# Patient Record
Sex: MALE | Race: WHITE | Employment: OTHER | URBAN - METROPOLITAN AREA
[De-identification: names, ages, dates, MRNs, and addresses within clinical notes are randomized per-mention and may not be internally consistent; named-entity substitution may affect disease eponyms.]

---

## 2017-01-11 ENCOUNTER — PATIENT OUTREACH (OUTPATIENT)
Dept: CASE MANAGEMENT | Age: 72
End: 2017-01-11

## 2017-01-11 NOTE — PROGRESS NOTES
Spoke to pt for CCM today. Pt states he is doing fantastic currently and does not need anything at this time. Advised will call in a month to see how things are going. Pt thanked me for calling.   Time spent communication and chart review: 5 minutes

## 2017-03-09 ENCOUNTER — PATIENT OUTREACH (OUTPATIENT)
Dept: CASE MANAGEMENT | Age: 72
End: 2017-03-09

## 2017-03-09 NOTE — PROGRESS NOTES
LM for pt to call back for CCM. Advised was due for annual physical in January and encouraged to call and schedule.

## 2017-04-13 ENCOUNTER — TELEPHONE (OUTPATIENT)
Dept: ENDOCRINOLOGY CLINIC | Facility: CLINIC | Age: 72
End: 2017-04-13

## 2017-04-17 ENCOUNTER — TELEPHONE (OUTPATIENT)
Dept: INTERNAL MEDICINE CLINIC | Facility: CLINIC | Age: 72
End: 2017-04-17

## 2017-04-17 NOTE — TELEPHONE ENCOUNTER
Would you like more time for the visit-  39 or 60mins? Or 30mins ok as scheduled? Please advise. Thank you.

## 2017-04-17 NOTE — TELEPHONE ENCOUNTER
Called pt to inform, per AS, ok to combine wellness visit w/ DM FU on 5/31. Pt verbalized understanding, expressed much appreciation.

## 2017-04-21 RX ORDER — LOSARTAN POTASSIUM 25 MG/1
TABLET ORAL
Qty: 90 TABLET | Refills: 0 | Status: SHIPPED | OUTPATIENT
Start: 2017-04-21 | End: 2017-07-26

## 2017-05-02 DIAGNOSIS — E11.9 TYPE 2 DIABETES MELLITUS WITHOUT COMPLICATION, WITHOUT LONG-TERM CURRENT USE OF INSULIN (HCC): Primary | ICD-10-CM

## 2017-05-02 DIAGNOSIS — Z12.5 SCREENING FOR PROSTATE CANCER: ICD-10-CM

## 2017-05-02 DIAGNOSIS — D59.9 ACQUIRED HEMOLYTIC ANEMIA (HCC): ICD-10-CM

## 2017-05-02 DIAGNOSIS — E78.00 HIGH CHOLESTEROL: ICD-10-CM

## 2017-05-02 DIAGNOSIS — Z13.29 SCREENING FOR THYROID DISORDER: ICD-10-CM

## 2017-05-02 DIAGNOSIS — Z86.2 HISTORY OF HEMOLYTIC ANEMIA: ICD-10-CM

## 2017-05-02 NOTE — PROGRESS NOTES
Patient has upcoming wellness visit and diabetes follow up (combined- ok per AS) at end of month. I have ordered some labs for him, they need to be fasting.  Appointment is 10:00, so I am not sure if he wants to fast for appointment and have drawn at that t

## 2017-05-03 NOTE — PROGRESS NOTES
Spoke with pt informed him info listed below.  Pt requested orders to be mailed to his new home address (updated) and a reminder to be sent through [facility] to complete the labs [employer] message sent)

## 2017-05-05 ENCOUNTER — PATIENT OUTREACH (OUTPATIENT)
Dept: CASE MANAGEMENT | Age: 72
End: 2017-05-05

## 2017-05-05 DIAGNOSIS — E11.9 TYPE 2 DIABETES MELLITUS WITHOUT COMPLICATION, WITHOUT LONG-TERM CURRENT USE OF INSULIN (HCC): ICD-10-CM

## 2017-05-05 DIAGNOSIS — E78.00 HIGH CHOLESTEROL: ICD-10-CM

## 2017-05-05 DIAGNOSIS — I10 ESSENTIAL HYPERTENSION: Primary | ICD-10-CM

## 2017-05-05 PROCEDURE — 99490 CHRNC CARE MGMT STAFF 1ST 20: CPT

## 2017-05-05 NOTE — PROGRESS NOTES
Spoke to pt at length about CCM, current care plan and performed CCM assessment with pt, reviewed meds and compliance. Reviewed pt dx DM, HTN, and HLD. Support system: Brother lives nearby. Diet: Pt trying to watch his fat and carbs.   Pt states he had ea

## 2017-05-10 ENCOUNTER — TELEPHONE (OUTPATIENT)
Dept: INTERNAL MEDICINE CLINIC | Facility: CLINIC | Age: 72
End: 2017-05-10

## 2017-05-31 ENCOUNTER — OFFICE VISIT (OUTPATIENT)
Dept: INTERNAL MEDICINE CLINIC | Facility: CLINIC | Age: 72
End: 2017-05-31

## 2017-05-31 VITALS
BODY MASS INDEX: 38.92 KG/M2 | WEIGHT: 248 LBS | DIASTOLIC BLOOD PRESSURE: 78 MMHG | HEART RATE: 68 BPM | HEIGHT: 67 IN | RESPIRATION RATE: 16 BRPM | TEMPERATURE: 98 F | SYSTOLIC BLOOD PRESSURE: 134 MMHG

## 2017-05-31 DIAGNOSIS — D80.1 HYPOGAMMAGLOBULINEMIA (HCC): ICD-10-CM

## 2017-05-31 DIAGNOSIS — J30.1 ALLERGIC RHINITIS DUE TO POLLEN, UNSPECIFIED RHINITIS SEASONALITY: ICD-10-CM

## 2017-05-31 DIAGNOSIS — I10 ESSENTIAL HYPERTENSION: ICD-10-CM

## 2017-05-31 DIAGNOSIS — D59.9 ACQUIRED HEMOLYTIC ANEMIA (HCC): ICD-10-CM

## 2017-05-31 DIAGNOSIS — R94.31 NONSPECIFIC ABNORMAL ELECTROCARDIOGRAM (ECG) (EKG): ICD-10-CM

## 2017-05-31 DIAGNOSIS — Z00.00 MEDICARE ANNUAL WELLNESS VISIT, SUBSEQUENT: Primary | ICD-10-CM

## 2017-05-31 DIAGNOSIS — M06.4 INFLAMMATORY POLYARTHRITIS (HCC): ICD-10-CM

## 2017-05-31 DIAGNOSIS — R06.81 APNEA: ICD-10-CM

## 2017-05-31 DIAGNOSIS — Z00.00 ENCOUNTER FOR ANNUAL HEALTH EXAMINATION: ICD-10-CM

## 2017-05-31 DIAGNOSIS — E78.00 HIGH CHOLESTEROL: ICD-10-CM

## 2017-05-31 DIAGNOSIS — E55.9 VITAMIN D DEFICIENCY: ICD-10-CM

## 2017-05-31 DIAGNOSIS — Z86.2 HISTORY OF HEMOLYTIC ANEMIA: ICD-10-CM

## 2017-05-31 DIAGNOSIS — K50.90 CROHN'S DISEASE WITHOUT COMPLICATION, UNSPECIFIED GASTROINTESTINAL TRACT LOCATION (HCC): ICD-10-CM

## 2017-05-31 DIAGNOSIS — E11.9 TYPE 2 DIABETES MELLITUS WITHOUT COMPLICATION, UNSPECIFIED LONG TERM INSULIN USE STATUS: ICD-10-CM

## 2017-05-31 DIAGNOSIS — G47.33 OSA TREATED WITH BIPAP: ICD-10-CM

## 2017-05-31 PROCEDURE — 99213 OFFICE O/P EST LOW 20 MIN: CPT | Performed by: INTERNAL MEDICINE

## 2017-05-31 PROCEDURE — 83036 HEMOGLOBIN GLYCOSYLATED A1C: CPT | Performed by: INTERNAL MEDICINE

## 2017-05-31 PROCEDURE — G0439 PPPS, SUBSEQ VISIT: HCPCS | Performed by: INTERNAL MEDICINE

## 2017-05-31 NOTE — PATIENT INSTRUCTIONS
Martínez Bio III's SCREENING SCHEDULE   Tests on this list are recommended by your physician but may not be covered, or covered at this frequency, by your insurer. Please check with your insurance carrier before scheduling to verify coverage. results found for this or any previous visit.  Limited to patients who meet one of the following criteria:   • Men who are 73-68 years old and have smoked more than 100 cigarettes in their lifetime   • Anyone with a family history    Colorectal Cancer Scree performed in visit on 10/15/13  -PNEUMOCOCCAL IMMUNIZATION    Please get once after your 65th birthday    Hepatitis B for Moderate/High Risk No orders found for this or any previous visit.  Medium/high risk factors:   End-stage renal disease   Hemophiliacs

## 2017-05-31 NOTE — PROGRESS NOTES
HPI:   Killian Sandoval III is a 81year old male who presents for a Medicare Subsequent Annual Wellness visit (Pt already had Initial Annual Wellness). Here for medicare well visit and f/u of htn, high chol, gary and dm2.  All stable, taking meds, route daily. Dx: E11.9   METFORMIN  MG Oral Tab TAKE ONE TABLET BY MOUTH DAILY WITH BREAKFAST.    LOSARTAN POTASSIUM 25 MG Oral Tab TAKE ONE TABLET BY MOUTH DAILY   PRAVASTATIN SODIUM 10 MG Oral Tab TAKE 1 TABLET BY MOUTH EVERY EVENING   Budesonide-F unspecified hyperlipidemia; Unspecified essential hypertension; BLOOD CLOTS; PULMONARY EMBOLISM; History of blood transfusion; Extrinsic asthma, unspecified; Pneumonia, organism unspecified(486); Esophageal reflux; Crohn disease ([facility] (4/15/2014);  Migrator lb.    Medicare Hearing Assessment  (Required for AWV/SWV)    Whispered Voice and hearing aides      Visual Acuity                           General Appearance:  Alert, cooperative, no distress, appears stated age   Head:  Normocephalic, without obvious ab 10/15/2013       ASSESSMENT AND OTHER RELEVANT CHRONIC CONDITIONS:   Isabella Jones III is a 81year old male who presents for a Medicare Assessment.      PLAN SUMMARY:   Diagnoses and all orders for this visit:    Medicare annual wellness visit, sub plan.    No Follow-up on file.      Melissa Friend MD, 5/31/2017       General Health     In the past six months, have you lost more than 10 pounds without trying?: 2 - No    Has your appetite been poor?: No    How does the patient maintain a good energy l doing things (over the last two weeks)?: Not at all    Feeling down, depressed, or hopeless (over the last two weeks)?: Not at all    PHQ-2 SCORE: 0         Advance Directives     Do you have a healthcare power of ?: No    Do you have a living will FHx Glaucoma, AA>50, > 65 Data entered on: 7/26/2016   Last Dilated Eye Exam 7/26/2016       Prostate Cancer Screening      PSA  Annually PSA due on 04/19/2018  Update Health Maintenance if applicable   Immunizations      Influenza   Orders placed Dilated Eye Exam 7/26/2016     No flowsheet data found. COPD      Spirometry Testing Annually Spirometry date:  No flowsheet data found.

## 2017-06-28 RX ORDER — PRAVASTATIN SODIUM 10 MG
TABLET ORAL
Qty: 90 TABLET | Refills: 0 | Status: SHIPPED | OUTPATIENT
Start: 2017-06-28 | End: 2017-11-30

## 2017-06-29 ENCOUNTER — TELEPHONE (OUTPATIENT)
Dept: INTERNAL MEDICINE CLINIC | Facility: CLINIC | Age: 72
End: 2017-06-29

## 2017-06-29 NOTE — TELEPHONE ENCOUNTER
Spoke to pt. He has his diabetic eye exam scheduled for 7/5/17. He will ask his specialist to fax over a copy of the report once it is complete.

## 2017-06-30 ENCOUNTER — PATIENT OUTREACH (OUTPATIENT)
Dept: CASE MANAGEMENT | Age: 72
End: 2017-06-30

## 2017-06-30 DIAGNOSIS — E11.9 TYPE 2 DIABETES MELLITUS WITHOUT COMPLICATION, WITHOUT LONG-TERM CURRENT USE OF INSULIN (HCC): ICD-10-CM

## 2017-06-30 DIAGNOSIS — E78.00 HIGH CHOLESTEROL: ICD-10-CM

## 2017-06-30 DIAGNOSIS — I10 ESSENTIAL HYPERTENSION: ICD-10-CM

## 2017-06-30 PROCEDURE — 99490 CHRNC CARE MGMT STAFF 1ST 20: CPT

## 2017-06-30 RX ORDER — PRAVASTATIN SODIUM 10 MG
TABLET ORAL
Qty: 90 TABLET | Refills: 0 | Status: SHIPPED | OUTPATIENT
Start: 2017-06-30 | End: 2017-09-18

## 2017-06-30 NOTE — PROGRESS NOTES
Spoke to pt at length about CCM, current care plan and performed CCM assessment with pt, reviewed meds and compliance. Reviewed pt dx HTN,HLD, DM. Support system: Lives alone. Brother lives nearby.   Diet: Pt states he continues to watch his diet and eats

## 2017-07-06 ENCOUNTER — TELEPHONE (OUTPATIENT)
Dept: INTERNAL MEDICINE CLINIC | Facility: CLINIC | Age: 72
End: 2017-07-06

## 2017-07-26 RX ORDER — LOSARTAN POTASSIUM 25 MG/1
25 TABLET ORAL
Qty: 90 TABLET | Refills: 0 | Status: SHIPPED | OUTPATIENT
Start: 2017-07-26 | End: 2017-10-23

## 2017-08-09 ENCOUNTER — PATIENT OUTREACH (OUTPATIENT)
Dept: CASE MANAGEMENT | Age: 72
End: 2017-08-09

## 2017-08-09 DIAGNOSIS — E78.00 HIGH CHOLESTEROL: ICD-10-CM

## 2017-08-09 DIAGNOSIS — I10 ESSENTIAL HYPERTENSION: ICD-10-CM

## 2017-08-09 DIAGNOSIS — E11.9 TYPE 2 DIABETES MELLITUS WITHOUT COMPLICATION, WITHOUT LONG-TERM CURRENT USE OF INSULIN (HCC): ICD-10-CM

## 2017-08-09 PROCEDURE — 99490 CHRNC CARE MGMT STAFF 1ST 20: CPT

## 2017-08-09 NOTE — PROGRESS NOTES
Spoke to pt at length about CCM, current care plan and performed CCM assessment with pt, reviewed meds and compliance. Reviewed pt dx HTN, HLD, DM. Support system: Lives alone. Brother lives nearby. Visits family and friends regularly.   Diet: Pt follow

## 2017-09-18 RX ORDER — PRAVASTATIN SODIUM 10 MG
TABLET ORAL
Qty: 90 TABLET | Refills: 0 | Status: SHIPPED | OUTPATIENT
Start: 2017-09-18 | End: 2017-09-20

## 2017-09-20 ENCOUNTER — PATIENT OUTREACH (OUTPATIENT)
Dept: CASE MANAGEMENT | Age: 72
End: 2017-09-20

## 2017-09-20 DIAGNOSIS — I10 ESSENTIAL HYPERTENSION: ICD-10-CM

## 2017-09-20 DIAGNOSIS — E78.00 HIGH CHOLESTEROL: ICD-10-CM

## 2017-09-20 DIAGNOSIS — E11.9 TYPE 2 DIABETES MELLITUS WITHOUT COMPLICATION, WITHOUT LONG-TERM CURRENT USE OF INSULIN (HCC): ICD-10-CM

## 2017-09-20 PROCEDURE — 99490 CHRNC CARE MGMT STAFF 1ST 20: CPT

## 2017-09-20 RX ORDER — PRAVASTATIN SODIUM 10 MG
TABLET ORAL
Qty: 90 TABLET | Refills: 0 | Status: SHIPPED | OUTPATIENT
Start: 2017-09-20 | End: 2017-11-30

## 2017-09-20 NOTE — PROGRESS NOTES
Spoke to pt at length about CCM, current care plan and performed CCM assessment with pt, reviewed meds and compliance. Reviewed pt dx DM2, HTN, HLD. Support system: Lives alone. Visits brother nearby. Brother in [place].   Diet: Pt states his diet is ok how

## 2017-10-13 ENCOUNTER — PATIENT OUTREACH (OUTPATIENT)
Dept: CASE MANAGEMENT | Age: 72
End: 2017-10-13

## 2017-10-13 NOTE — PROGRESS NOTES
Coordination of education, review of chart, update to pt record, compilation and mailing resources/materials: Flu education, Why get the flu vaccine, and request to get flu vaccine with PCP and or Gutierrez hernandez.   Time: 5 min

## 2017-10-23 RX ORDER — LOSARTAN POTASSIUM 25 MG/1
TABLET ORAL
Qty: 90 TABLET | Refills: 0 | Status: SHIPPED | OUTPATIENT
Start: 2017-10-23 | End: 2018-01-19

## 2017-10-26 DIAGNOSIS — E11.9 TYPE 2 DIABETES MELLITUS WITHOUT COMPLICATION, UNSPECIFIED LONG TERM INSULIN USE STATUS: ICD-10-CM

## 2017-10-26 NOTE — TELEPHONE ENCOUNTER
From: Jasmyn De La Garza III  Sent: 10/26/2017 8:34 AM CDT  Subject: Medication Renewal Request    Mihaela Hernandez III would like a refill of the following medications:     Glucose Blood (ACCU-CHEK SMARTVIEW) In Vitro Strip Carlos Kraus MD]   [facility]

## 2017-11-03 ENCOUNTER — TELEPHONE (OUTPATIENT)
Dept: INTERNAL MEDICINE CLINIC | Facility: CLINIC | Age: 72
End: 2017-11-03

## 2017-11-03 ENCOUNTER — PATIENT OUTREACH (OUTPATIENT)
Dept: CASE MANAGEMENT | Age: 72
End: 2017-11-03

## 2017-11-03 DIAGNOSIS — E11.9 TYPE 2 DIABETES MELLITUS WITHOUT COMPLICATION, WITHOUT LONG-TERM CURRENT USE OF INSULIN (HCC): ICD-10-CM

## 2017-11-03 DIAGNOSIS — Z12.5 SCREENING PSA (PROSTATE SPECIFIC ANTIGEN): ICD-10-CM

## 2017-11-03 DIAGNOSIS — I10 ESSENTIAL HYPERTENSION: ICD-10-CM

## 2017-11-03 DIAGNOSIS — I10 ESSENTIAL HYPERTENSION: Primary | ICD-10-CM

## 2017-11-03 DIAGNOSIS — E78.00 HIGH CHOLESTEROL: ICD-10-CM

## 2017-11-03 PROCEDURE — 99490 CHRNC CARE MGMT STAFF 1ST 20: CPT

## 2017-11-03 NOTE — TELEPHONE ENCOUNTER
Orders have been placed and faxed as requested. Confirmation received. Pt notified. Pt states that he will call back for appt. Nothing further at this time.

## 2017-11-03 NOTE — PROGRESS NOTES
Spoke to pt at length about CCM, current care plan and performed CCM assessment with pt, reviewed meds and compliance. Reviewed pt dx HTN, HLD, and DM2. Support system: Lives alone. Brother lives nearby and other brother lives in [place].   Diet: Pt states he [facility]#[phone]. Last AWV 05/31/17 and labs done 05/02/17. Advised pt call sent to PCP to order future labs pt due for and also to schedule appt. Pt states he will be out of state from 12/15 - 01/15.   Pt informed will receive a call back from PCP offic

## 2017-11-03 NOTE — TELEPHONE ENCOUNTER
Spoke to pt for CCM today. Pt is due for labs and 6 month appt as of 11/30. Pt lives out of the area and prefers to have labs faxed to [facility]#[phone] and [facility]#[phone]. Last AWV 05/31/17 and labs done 05/02/17.

## 2017-11-08 ENCOUNTER — TELEPHONE (OUTPATIENT)
Dept: INTERNAL MEDICINE CLINIC | Facility: CLINIC | Age: 72
End: 2017-11-08

## 2017-11-08 NOTE — TELEPHONE ENCOUNTER
Pt called requesting us to fax his lab orders to [facility] at fax [phone] did and received a confirmation that it went thru

## 2017-11-15 ENCOUNTER — TELEPHONE (OUTPATIENT)
Dept: INTERNAL MEDICINE CLINIC | Facility: CLINIC | Age: 72
End: 2017-11-15

## 2017-11-15 DIAGNOSIS — R79.9 ABNORMAL SERUM TOTAL PROTEIN LEVEL: Primary | ICD-10-CM

## 2017-11-17 NOTE — TELEPHONE ENCOUNTER
Total protein (and globulin) levels low. Please ensure patient is eating enough protein and recheck levels in 4-6 weeks (ordered). Thanks.

## 2017-11-17 NOTE — TELEPHONE ENCOUNTER
[phone]  Called pt to inform, per JV, total protein (and globulin) levels low. Encouraged to eat enough protein. Advised CMP ordered to recheck levels in 4-6 weeks. Pt verbalized understanding and agreed with POC.

## 2017-11-30 ENCOUNTER — OFFICE VISIT (OUTPATIENT)
Dept: INTERNAL MEDICINE CLINIC | Facility: CLINIC | Age: 72
End: 2017-11-30

## 2017-11-30 VITALS
HEIGHT: 68 IN | RESPIRATION RATE: 16 BRPM | TEMPERATURE: 98 F | DIASTOLIC BLOOD PRESSURE: 66 MMHG | SYSTOLIC BLOOD PRESSURE: 134 MMHG | WEIGHT: 252.81 LBS | BODY MASS INDEX: 38.31 KG/M2 | HEART RATE: 68 BPM

## 2017-11-30 DIAGNOSIS — I10 ESSENTIAL HYPERTENSION: ICD-10-CM

## 2017-11-30 DIAGNOSIS — E11.9 TYPE 2 DIABETES MELLITUS WITHOUT COMPLICATION, WITHOUT LONG-TERM CURRENT USE OF INSULIN (HCC): Primary | ICD-10-CM

## 2017-11-30 DIAGNOSIS — E78.00 HIGH CHOLESTEROL: ICD-10-CM

## 2017-11-30 PROCEDURE — 99214 OFFICE O/P EST MOD 30 MIN: CPT | Performed by: INTERNAL MEDICINE

## 2017-11-30 RX ORDER — OFLOXACIN 3 MG/ML
SOLUTION AURICULAR (OTIC)
Refills: 1 | Status: ON HOLD | COMMUNITY
Start: 2017-10-18 | End: 2021-07-28

## 2017-11-30 NOTE — PROGRESS NOTES
Patient presents with:  Diabetes: ROOM 8 here for follow up before trip. HPI:  Here for f/u of dm2, htn, high chol, doing well taking meds no se's. Review of Systems   No f/c/chest pain or sob. No cough. No abd pain/n/v/d. No ha or dizziness.  No Allergic rhinitis     Apnea     Nonspecific abnormal electrocardiogram (ECG) (EKG)     Vitamin D deficiency     Essential hypertension     History of hemolytic anemia     Hemolytic anemia (HCC)     Hypogammaglobulinemia (HCC)     Crohn disease ([facility]     I Position: Sitting, Cuff Size: adult)   Pulse 68   Temp 97.7 °F (36.5 °C) (Oral)   Resp 16   Ht 68\"   Wt 252 lb 12.8 oz   BMI 38.44 kg/m²   Constitutional: Oriented to person, place, and time. No distress. HEENT:  Normocephalic and atraumatic. TM's wnl.

## 2017-12-11 ENCOUNTER — PATIENT OUTREACH (OUTPATIENT)
Dept: CASE MANAGEMENT | Age: 72
End: 2017-12-11

## 2017-12-11 DIAGNOSIS — E78.00 HIGH CHOLESTEROL: ICD-10-CM

## 2017-12-11 DIAGNOSIS — I10 ESSENTIAL HYPERTENSION: ICD-10-CM

## 2017-12-11 DIAGNOSIS — E11.9 TYPE 2 DIABETES MELLITUS WITHOUT COMPLICATION, WITHOUT LONG-TERM CURRENT USE OF INSULIN (HCC): ICD-10-CM

## 2017-12-11 PROCEDURE — 99490 CHRNC CARE MGMT STAFF 1ST 20: CPT

## 2017-12-11 NOTE — PROGRESS NOTES
Spoke to pt at length about CCM, current care plan and performed CCM assessment with pt, reviewed meds and compliance. Reviewed pt dx HTN, HLD, DM, and MOMO. Support system: Lives alone. Brother lives nearby.     Diet: Pt states he is eating better recent

## 2018-01-19 RX ORDER — LOSARTAN POTASSIUM 25 MG/1
TABLET ORAL
Qty: 90 TABLET | Refills: 0 | Status: SHIPPED | OUTPATIENT
Start: 2018-01-19 | End: 2018-04-19

## 2018-01-25 ENCOUNTER — PATIENT OUTREACH (OUTPATIENT)
Dept: CASE MANAGEMENT | Age: 73
End: 2018-01-25

## 2018-01-25 NOTE — PROGRESS NOTES
Spoke to pt for ccm today. Pt states he is doing fine and has decided to discontinue ccm program at this time. Pt states he feels he would like to work on health goals on his own and doesn't feel he would make any other changes at this point in time.   En

## 2018-03-20 NOTE — PROGRESS NOTES
Spoke to pt and scheduled pt for   Your appointments     Date & Time Appointment Department [facility])    Apr 25, 2018  2:00 PM CDT Apn/Md Diabetic Follow Up with Mita Giraldo MD [facility] ([address])        [address]

## 2018-03-27 RX ORDER — PRAVASTATIN SODIUM 10 MG
TABLET ORAL
Qty: 90 TABLET | Refills: 0 | Status: SHIPPED | OUTPATIENT
Start: 2018-03-27 | End: 2018-06-22

## 2018-04-18 ENCOUNTER — LAB ENCOUNTER (OUTPATIENT)
Dept: LAB | Age: 73
End: 2018-04-18
Attending: NURSE PRACTITIONER
Payer: MEDICARE

## 2018-04-18 DIAGNOSIS — R79.9 ABNORMAL SERUM TOTAL PROTEIN LEVEL: ICD-10-CM

## 2018-04-18 DIAGNOSIS — E11.9 TYPE 2 DIABETES MELLITUS WITHOUT COMPLICATION, WITHOUT LONG-TERM CURRENT USE OF INSULIN (HCC): ICD-10-CM

## 2018-04-18 DIAGNOSIS — E78.00 HIGH CHOLESTEROL: ICD-10-CM

## 2018-04-18 DIAGNOSIS — Z86.2 HISTORY OF HEMOLYTIC ANEMIA: ICD-10-CM

## 2018-04-18 DIAGNOSIS — Z12.5 SCREENING FOR PROSTATE CANCER: ICD-10-CM

## 2018-04-18 DIAGNOSIS — D59.9 ACQUIRED HEMOLYTIC ANEMIA (HCC): ICD-10-CM

## 2018-04-18 DIAGNOSIS — I10 ESSENTIAL HYPERTENSION: ICD-10-CM

## 2018-04-18 DIAGNOSIS — Z12.5 SCREENING PSA (PROSTATE SPECIFIC ANTIGEN): ICD-10-CM

## 2018-04-18 DIAGNOSIS — Z13.29 SCREENING FOR THYROID DISORDER: ICD-10-CM

## 2018-04-18 PROCEDURE — 80061 LIPID PANEL: CPT

## 2018-04-18 PROCEDURE — 80053 COMPREHEN METABOLIC PANEL: CPT

## 2018-04-18 PROCEDURE — 83036 HEMOGLOBIN GLYCOSYLATED A1C: CPT

## 2018-04-18 PROCEDURE — 85025 COMPLETE CBC W/AUTO DIFF WBC: CPT

## 2018-04-18 PROCEDURE — 84443 ASSAY THYROID STIM HORMONE: CPT

## 2018-04-18 PROCEDURE — 36415 COLL VENOUS BLD VENIPUNCTURE: CPT

## 2018-04-18 PROCEDURE — 82570 ASSAY OF URINE CREATININE: CPT

## 2018-04-18 PROCEDURE — 82043 UR ALBUMIN QUANTITATIVE: CPT

## 2018-04-19 RX ORDER — LOSARTAN POTASSIUM 25 MG/1
TABLET ORAL
Qty: 90 TABLET | Refills: 0 | Status: SHIPPED | OUTPATIENT
Start: 2018-04-19 | End: 2018-07-16

## 2018-04-25 ENCOUNTER — OFFICE VISIT (OUTPATIENT)
Dept: ENDOCRINOLOGY CLINIC | Facility: CLINIC | Age: 73
End: 2018-04-25

## 2018-04-25 VITALS
WEIGHT: 247 LBS | TEMPERATURE: 98 F | HEART RATE: 68 BPM | BODY MASS INDEX: 37.44 KG/M2 | RESPIRATION RATE: 16 BRPM | DIASTOLIC BLOOD PRESSURE: 68 MMHG | SYSTOLIC BLOOD PRESSURE: 130 MMHG | HEIGHT: 68 IN

## 2018-04-25 DIAGNOSIS — E11.9 TYPE 2 DIABETES MELLITUS WITHOUT COMPLICATION, WITHOUT LONG-TERM CURRENT USE OF INSULIN (HCC): Primary | ICD-10-CM

## 2018-04-25 DIAGNOSIS — E78.00 HIGH CHOLESTEROL: ICD-10-CM

## 2018-04-25 DIAGNOSIS — I10 ESSENTIAL HYPERTENSION: ICD-10-CM

## 2018-04-25 PROCEDURE — 99214 OFFICE O/P EST MOD 30 MIN: CPT | Performed by: INTERNAL MEDICINE

## 2018-04-25 RX ORDER — IBUPROFEN 200 MG
200 TABLET ORAL EVERY 6 HOURS PRN
COMMUNITY
End: 2019-03-12

## 2018-04-25 NOTE — PROGRESS NOTES
Patient presents with:  Diabetes: follow up. LB-room 9      HPI:  Here for f/u dm2, htn, high chol. Pt doing well. Sugars spiked while on steroids, much improved back to baseline. Feels well. No other complaints.      Review of Systems   No f/c/chest pain o Unspecified tinnitus        Patient Active Problem List:     Allergic rhinitis     Apnea     Nonspecific abnormal electrocardiogram (ECG) (EKG)     Vitamin D deficiency     Essential hypertension     History of hemolytic anemia     Hemolytic anemia (HCC) Cap Take 1 tablet by mouth daily. Disp:  Rfl:    folic acid (FOLVITE) 012 MCG Oral Tab Take 1,600 mcg by mouth daily.  Disp:  Rfl:        Physical Exam  /68 (BP Location: Right arm, Patient Position: Sitting, Cuff Size: adult)   Pulse 68   Temp 97.7 °

## 2018-04-25 NOTE — PROGRESS NOTES
Recommendation: add regular physical activity and reduce starches    A1C: 7.7% (4/18/18), 6.6% (5/31/17). States was on pred 20 mg x 7 days for arthritis in knees flare-up   Wt stable but excessive.  Discussed need to reduce to reduce cv risk    Diet: tries

## 2018-06-22 RX ORDER — PRAVASTATIN SODIUM 10 MG
TABLET ORAL
Qty: 90 TABLET | Refills: 0 | Status: SHIPPED | OUTPATIENT
Start: 2018-06-22 | End: 2018-09-17

## 2018-07-16 RX ORDER — LOSARTAN POTASSIUM 25 MG/1
TABLET ORAL
Qty: 90 TABLET | Refills: 0 | Status: SHIPPED | OUTPATIENT
Start: 2018-07-16 | End: 2018-10-12

## 2018-07-16 NOTE — TELEPHONE ENCOUNTER
LOV: 4/25/18 AS  FOV: 8/20/18  LAST RX: 4/19/18 losartan 25 mg 1 tab daily 90 tablets 0 refills.   LAST LABS: 4/18/18 microalb,A1C,psa,tsh,lipid,cbc,cmp  PER PROTOCOL:

## 2018-08-07 ENCOUNTER — PATIENT MESSAGE (OUTPATIENT)
Dept: INTERNAL MEDICINE CLINIC | Facility: CLINIC | Age: 73
End: 2018-08-07

## 2018-08-07 DIAGNOSIS — E11.9 TYPE 2 DIABETES MELLITUS WITHOUT COMPLICATION, WITHOUT LONG-TERM CURRENT USE OF INSULIN (HCC): Primary | ICD-10-CM

## 2018-08-07 NOTE — TELEPHONE ENCOUNTER
From: Babar Sandhu III  To: Rubi Tao MD  Sent: 8/7/2018 11:34 AM CDT  Subject: Other    I have an appt scheduled with Dr Destini Hinton on Aug 20. Is there an order for labs on file? At [facility]?

## 2018-08-07 NOTE — TELEPHONE ENCOUNTER
Pt has [address] scheduled for 8/20. Last labs: 4/18/18  LOV: 4/25/18  Last wellness visit: 5/31/17    Does pt need labs prior to 8/20? Please advise, thank you.

## 2018-08-08 ENCOUNTER — APPOINTMENT (OUTPATIENT)
Dept: LAB | Facility: HOSPITAL | Age: 73
End: 2018-08-08
Attending: INTERNAL MEDICINE
Payer: MEDICARE

## 2018-08-08 DIAGNOSIS — E11.9 TYPE 2 DIABETES MELLITUS WITHOUT COMPLICATION, WITHOUT LONG-TERM CURRENT USE OF INSULIN (HCC): ICD-10-CM

## 2018-08-08 LAB
ALBUMIN SERPL-MCNC: 3.9 G/DL (ref 3.5–4.8)
ALBUMIN/GLOB SERPL: 1.4 {RATIO} (ref 1–2)
ALP LIVER SERPL-CCNC: 58 U/L (ref 45–117)
ALT SERPL-CCNC: 34 U/L (ref 17–63)
ANION GAP SERPL CALC-SCNC: 7 MMOL/L (ref 0–18)
AST SERPL-CCNC: 16 U/L (ref 15–41)
BILIRUB SERPL-MCNC: 0.6 MG/DL (ref 0.1–2)
BUN BLD-MCNC: 18 MG/DL (ref 8–20)
BUN/CREAT SERPL: 14.9 (ref 10–20)
CALCIUM BLD-MCNC: 9.1 MG/DL (ref 8.3–10.3)
CHLORIDE SERPL-SCNC: 105 MMOL/L (ref 101–111)
CO2 SERPL-SCNC: 27 MMOL/L (ref 22–32)
CREAT BLD-MCNC: 1.21 MG/DL (ref 0.7–1.3)
EST. AVERAGE GLUCOSE BLD GHB EST-MCNC: 131 MG/DL (ref 68–126)
GLOBULIN PLAS-MCNC: 2.8 G/DL (ref 2.5–3.7)
GLUCOSE BLD-MCNC: 119 MG/DL (ref 70–99)
HBA1C MFR BLD HPLC: 6.2 % (ref ?–5.7)
M PROTEIN MFR SERPL ELPH: 6.7 G/DL (ref 6.1–8.3)
OSMOLALITY SERPL CALC.SUM OF ELEC: 291 MOSM/KG (ref 275–295)
POTASSIUM SERPL-SCNC: 4.2 MMOL/L (ref 3.6–5.1)
SODIUM SERPL-SCNC: 139 MMOL/L (ref 136–144)

## 2018-08-08 PROCEDURE — 83036 HEMOGLOBIN GLYCOSYLATED A1C: CPT

## 2018-08-08 PROCEDURE — 80053 COMPREHEN METABOLIC PANEL: CPT

## 2018-08-08 PROCEDURE — 36415 COLL VENOUS BLD VENIPUNCTURE: CPT

## 2018-08-20 ENCOUNTER — OFFICE VISIT (OUTPATIENT)
Dept: INTERNAL MEDICINE CLINIC | Facility: CLINIC | Age: 73
End: 2018-08-20
Payer: MEDICARE

## 2018-08-20 VITALS
SYSTOLIC BLOOD PRESSURE: 122 MMHG | RESPIRATION RATE: 16 BRPM | HEIGHT: 67 IN | WEIGHT: 237 LBS | DIASTOLIC BLOOD PRESSURE: 64 MMHG | BODY MASS INDEX: 37.2 KG/M2 | HEART RATE: 68 BPM | TEMPERATURE: 98 F

## 2018-08-20 DIAGNOSIS — K50.90 CROHN'S DISEASE WITHOUT COMPLICATION, UNSPECIFIED GASTROINTESTINAL TRACT LOCATION (HCC): ICD-10-CM

## 2018-08-20 DIAGNOSIS — M06.4 INFLAMMATORY POLYARTHRITIS (HCC): ICD-10-CM

## 2018-08-20 DIAGNOSIS — E78.00 HIGH CHOLESTEROL: ICD-10-CM

## 2018-08-20 DIAGNOSIS — Z86.2 HISTORY OF HEMOLYTIC ANEMIA: ICD-10-CM

## 2018-08-20 DIAGNOSIS — G47.33 OSA TREATED WITH BIPAP: ICD-10-CM

## 2018-08-20 DIAGNOSIS — D80.1 HYPOGAMMAGLOBULINEMIA (HCC): ICD-10-CM

## 2018-08-20 DIAGNOSIS — I10 ESSENTIAL HYPERTENSION: ICD-10-CM

## 2018-08-20 DIAGNOSIS — R06.81 APNEA: ICD-10-CM

## 2018-08-20 DIAGNOSIS — E11.9 TYPE 2 DIABETES MELLITUS WITHOUT COMPLICATION, WITHOUT LONG-TERM CURRENT USE OF INSULIN (HCC): ICD-10-CM

## 2018-08-20 DIAGNOSIS — J30.1 ALLERGIC RHINITIS DUE TO POLLEN, UNSPECIFIED SEASONALITY: ICD-10-CM

## 2018-08-20 DIAGNOSIS — E55.9 VITAMIN D DEFICIENCY: ICD-10-CM

## 2018-08-20 DIAGNOSIS — D59.9 ACQUIRED HEMOLYTIC ANEMIA (HCC): ICD-10-CM

## 2018-08-20 DIAGNOSIS — R94.31 NONSPECIFIC ABNORMAL ELECTROCARDIOGRAM (ECG) (EKG): ICD-10-CM

## 2018-08-20 DIAGNOSIS — Z00.00 ENCOUNTER FOR ANNUAL HEALTH EXAMINATION: Primary | ICD-10-CM

## 2018-08-20 PROCEDURE — G0439 PPPS, SUBSEQ VISIT: HCPCS | Performed by: INTERNAL MEDICINE

## 2018-08-20 RX ORDER — MULTIVIT-MIN/FOLIC/VIT K/LYCOP 400-300MCG
1 TABLET ORAL DAILY
COMMUNITY

## 2018-08-20 NOTE — PATIENT INSTRUCTIONS
Martínez Bio III's SCREENING SCHEDULE   Tests on this list are recommended by your physician but may not be covered, or covered at this frequency, by your insurer. Please check with your insurance carrier before scheduling to verify coverage. Abdominal aortic aneurysm screening (once between ages 73-68)  No results found for this or any previous visit.  Limited to patients who meet one of the following criteria:   • Men who are 73-68 years old and have smoked more than 100 cigarettes in their li (Pneumovax)  Covered Once after 65   Orders placed or performed in visit on 10/15/13  -PNEUMOCOCCAL IMMUNIZATION    Please get once after your 65th birthday    Hepatitis B for Moderate/High Risk No orders found for this or any previous visit.  Medium/high r

## 2018-08-20 NOTE — PROGRESS NOTES
HPI:   Abby Meza III is a 72year old male who presents for a Medicare Subsequent Annual Wellness visit (Pt already had Initial Annual Wellness). Here for medicare well visit. Pt has dm2, htn, high chol, gary and crohn's.  All stable takin screening   Mardel Credit III was screened for Alcohol abuse and had a score of 0 so is at low risk.      Patient Care Team: Patient Care Team:  Vanessa Dale MD as PCP - General  Kenneth Kline MD (Otolaryngologist (ENT))  Venus Juarez, EVENING   ibuprofen 200 MG Oral Tab Take 200 mg by mouth every 6 (six) hours as needed for Pain. Budesonide-Formoterol Fumarate 160-4.5 MCG/ACT Inhalation Aerosol Inhale 2 puffs into the lungs 2 (two) times daily.    Ipratropium Bromide (ATROVENT) 0.06 % hyperlipidemia; OTHER DISEASES; OTHER DISEASES; OTHER DISEASES; OTHER DISEASES; Other specified disorders of pancreatic internal secretion; Personal history of pneumonia (recurrent); Personal history of venous thrombosis and embolism; Plantar fasciitis;  Pn calculated from the following:    Height as of this encounter: 67\". Weight as of this encounter: 237 lb.     Medicare Hearing Assessment  (Required for AWV/SWV)    Whispered Voice          Visual Acuity                           General Appearance:  Katherine PLAN SUMMARY:   Diagnoses and all orders for this visit:    Encounter for annual health examination    Acquired hemolytic anemia ([facility]  resolved  Hypogammaglobulinemia ([facility]  Stable continue observation  Allergic rhinitis due to pollen, unspecified sea 08/08/2018 6.2 (H)       No flowsheet data found.     Fasting Blood Sugar (FSB)Annually   Glucose (mg/dL)   Date Value   08/08/2018 119 (H)   05/09/2017 147   11/03/2015 109 (H)   ----------  GLUCOSE (mg/dL)   Date Value   04/17/2014 126 (H)   ---------- metal- TD and TDaP Not covered by Medicare Part B) No vaccine history found This may be covered with your prescription benefits, but Medicare does not cover unless Medically needed    Zoster   Not covered by Medicare Part B No vaccine history found This ma

## 2018-09-17 RX ORDER — PRAVASTATIN SODIUM 10 MG
TABLET ORAL
Qty: 90 TABLET | Refills: 0 | Status: SHIPPED | OUTPATIENT
Start: 2018-09-17 | End: 2018-12-13

## 2018-10-12 RX ORDER — LOSARTAN POTASSIUM 25 MG/1
TABLET ORAL
Qty: 90 TABLET | Refills: 0 | Status: SHIPPED | OUTPATIENT
Start: 2018-10-12 | End: 2019-01-19

## 2018-12-13 RX ORDER — PRAVASTATIN SODIUM 10 MG
TABLET ORAL
Qty: 90 TABLET | Refills: 0 | Status: SHIPPED | OUTPATIENT
Start: 2018-12-13 | End: 2019-03-08

## 2019-01-19 RX ORDER — LOSARTAN POTASSIUM 25 MG/1
TABLET ORAL
Qty: 90 TABLET | Refills: 0 | Status: SHIPPED | OUTPATIENT
Start: 2019-01-19 | End: 2019-04-18

## 2019-02-26 ENCOUNTER — TELEPHONE (OUTPATIENT)
Dept: INTERNAL MEDICINE CLINIC | Facility: CLINIC | Age: 74
End: 2019-02-26

## 2019-02-26 DIAGNOSIS — I10 ESSENTIAL HYPERTENSION: ICD-10-CM

## 2019-02-26 DIAGNOSIS — E11.9 TYPE 2 DIABETES MELLITUS WITHOUT COMPLICATION, WITHOUT LONG-TERM CURRENT USE OF INSULIN (HCC): Primary | ICD-10-CM

## 2019-02-26 DIAGNOSIS — E78.00 HIGH CHOLESTEROL: ICD-10-CM

## 2019-02-27 NOTE — TELEPHONE ENCOUNTER
Spoke with pt informed him info listed below.  Pt thankful for the call will complete the labs prior to appt

## 2019-02-27 NOTE — TELEPHONE ENCOUNTER
Future Appointments   Date Time Provider Mini Elisabeth   3/12/2019 11:30 AM Yvette Favre, APRN [facility]   6/12/2019  1:00 PM Domingo Liriano MD [facility]       Ok to see Murtis Torie or ok to wait until June to see AS?  P

## 2019-03-08 ENCOUNTER — LAB ENCOUNTER (OUTPATIENT)
Dept: LAB | Facility: HOSPITAL | Age: 74
End: 2019-03-08
Attending: INTERNAL MEDICINE
Payer: MEDICARE

## 2019-03-08 DIAGNOSIS — E11.9 TYPE 2 DIABETES MELLITUS WITHOUT COMPLICATION, WITHOUT LONG-TERM CURRENT USE OF INSULIN (HCC): ICD-10-CM

## 2019-03-08 DIAGNOSIS — E78.00 HIGH CHOLESTEROL: ICD-10-CM

## 2019-03-08 DIAGNOSIS — I10 ESSENTIAL HYPERTENSION: ICD-10-CM

## 2019-03-08 LAB
ALBUMIN SERPL-MCNC: 4.1 G/DL (ref 3.4–5)
ALBUMIN/GLOB SERPL: 1.5 {RATIO} (ref 1–2)
ALP LIVER SERPL-CCNC: 62 U/L (ref 45–117)
ALT SERPL-CCNC: 41 U/L (ref 16–61)
ANION GAP SERPL CALC-SCNC: 8 MMOL/L (ref 0–18)
AST SERPL-CCNC: 15 U/L (ref 15–37)
BASOPHILS # BLD AUTO: 0.06 X10(3) UL (ref 0–0.2)
BASOPHILS NFR BLD AUTO: 0.8 %
BILIRUB SERPL-MCNC: 0.5 MG/DL (ref 0.1–2)
BUN BLD-MCNC: 18 MG/DL (ref 7–18)
BUN/CREAT SERPL: 12.5 (ref 10–20)
CALCIUM BLD-MCNC: 9.2 MG/DL (ref 8.5–10.1)
CHLORIDE SERPL-SCNC: 106 MMOL/L (ref 98–107)
CHOLEST SMN-MCNC: 186 MG/DL (ref ?–200)
CO2 SERPL-SCNC: 27 MMOL/L (ref 21–32)
CREAT BLD-MCNC: 1.44 MG/DL (ref 0.7–1.3)
CREAT UR-SCNC: 183 MG/DL
DEPRECATED RDW RBC AUTO: 46 FL (ref 35.1–46.3)
EOSINOPHIL # BLD AUTO: 0.16 X10(3) UL (ref 0–0.7)
EOSINOPHIL NFR BLD AUTO: 2.2 %
ERYTHROCYTE [DISTWIDTH] IN BLOOD BY AUTOMATED COUNT: 14.1 % (ref 11–15)
EST. AVERAGE GLUCOSE BLD GHB EST-MCNC: 140 MG/DL (ref 68–126)
GLOBULIN PLAS-MCNC: 2.7 G/DL (ref 2.8–4.4)
GLUCOSE BLD-MCNC: 139 MG/DL (ref 70–99)
HBA1C MFR BLD HPLC: 6.5 % (ref ?–5.7)
HCT VFR BLD AUTO: 41.8 % (ref 39–53)
HDLC SERPL-MCNC: 37 MG/DL (ref 40–59)
HGB BLD-MCNC: 13.8 G/DL (ref 13–17.5)
IMM GRANULOCYTES # BLD AUTO: 0.04 X10(3) UL (ref 0–1)
IMM GRANULOCYTES NFR BLD: 0.5 %
LDLC SERPL CALC-MCNC: 110 MG/DL (ref ?–100)
LYMPHOCYTES # BLD AUTO: 2.56 X10(3) UL (ref 1–4)
LYMPHOCYTES NFR BLD AUTO: 35.2 %
M PROTEIN MFR SERPL ELPH: 6.8 G/DL (ref 6.4–8.2)
MCH RBC QN AUTO: 29.6 PG (ref 26–34)
MCHC RBC AUTO-ENTMCNC: 33 G/DL (ref 31–37)
MCV RBC AUTO: 89.7 FL (ref 80–100)
MICROALBUMIN UR-MCNC: 7.33 MG/DL
MICROALBUMIN/CREAT 24H UR-RTO: 40.1 UG/MG (ref ?–30)
MONOCYTES # BLD AUTO: 0.57 X10(3) UL (ref 0.1–1)
MONOCYTES NFR BLD AUTO: 7.8 %
NEUTROPHILS # BLD AUTO: 3.89 X10 (3) UL (ref 1.5–7.7)
NEUTROPHILS # BLD AUTO: 3.89 X10(3) UL (ref 1.5–7.7)
NEUTROPHILS NFR BLD AUTO: 53.5 %
NONHDLC SERPL-MCNC: 149 MG/DL (ref ?–130)
OSMOLALITY SERPL CALC.SUM OF ELEC: 296 MOSM/KG (ref 275–295)
PLATELET # BLD AUTO: 211 10(3)UL (ref 150–450)
POTASSIUM SERPL-SCNC: 4.4 MMOL/L (ref 3.5–5.1)
RBC # BLD AUTO: 4.66 X10(6)UL (ref 3.8–5.8)
SODIUM SERPL-SCNC: 141 MMOL/L (ref 136–145)
TRIGL SERPL-MCNC: 193 MG/DL (ref 30–149)
VLDLC SERPL CALC-MCNC: 39 MG/DL (ref 0–30)
WBC # BLD AUTO: 7.3 X10(3) UL (ref 4–11)

## 2019-03-08 PROCEDURE — 80053 COMPREHEN METABOLIC PANEL: CPT

## 2019-03-08 PROCEDURE — 85025 COMPLETE CBC W/AUTO DIFF WBC: CPT

## 2019-03-08 PROCEDURE — 82043 UR ALBUMIN QUANTITATIVE: CPT

## 2019-03-08 PROCEDURE — 82570 ASSAY OF URINE CREATININE: CPT

## 2019-03-08 PROCEDURE — 36415 COLL VENOUS BLD VENIPUNCTURE: CPT

## 2019-03-08 PROCEDURE — 80061 LIPID PANEL: CPT

## 2019-03-08 PROCEDURE — 83036 HEMOGLOBIN GLYCOSYLATED A1C: CPT

## 2019-03-08 RX ORDER — PRAVASTATIN SODIUM 10 MG
TABLET ORAL
Qty: 90 TABLET | Refills: 0 | Status: SHIPPED | OUTPATIENT
Start: 2019-03-08 | End: 2019-06-15

## 2019-03-12 ENCOUNTER — OFFICE VISIT (OUTPATIENT)
Dept: ENDOCRINOLOGY CLINIC | Facility: CLINIC | Age: 74
End: 2019-03-12
Payer: MEDICARE

## 2019-03-12 VITALS
RESPIRATION RATE: 20 BRPM | BODY MASS INDEX: 37.74 KG/M2 | TEMPERATURE: 98 F | WEIGHT: 249 LBS | DIASTOLIC BLOOD PRESSURE: 60 MMHG | SYSTOLIC BLOOD PRESSURE: 130 MMHG | HEART RATE: 66 BPM | HEIGHT: 68 IN

## 2019-03-12 DIAGNOSIS — R80.9 TYPE 2 DIABETES MELLITUS WITH MICROALBUMINURIA, WITHOUT LONG-TERM CURRENT USE OF INSULIN (HCC): Primary | ICD-10-CM

## 2019-03-12 DIAGNOSIS — E11.29 TYPE 2 DIABETES MELLITUS WITH MICROALBUMINURIA, WITHOUT LONG-TERM CURRENT USE OF INSULIN (HCC): Primary | ICD-10-CM

## 2019-03-12 PROBLEM — E11.69 DYSLIPIDEMIA ASSOCIATED WITH TYPE 2 DIABETES MELLITUS: Status: ACTIVE | Noted: 2019-03-12

## 2019-03-12 PROBLEM — E78.5 DYSLIPIDEMIA ASSOCIATED WITH TYPE 2 DIABETES MELLITUS (HCC): Status: ACTIVE | Noted: 2019-03-12

## 2019-03-12 PROBLEM — E11.69 DYSLIPIDEMIA ASSOCIATED WITH TYPE 2 DIABETES MELLITUS  (HCC): Status: ACTIVE | Noted: 2019-03-12

## 2019-03-12 PROBLEM — E11.69 DYSLIPIDEMIA ASSOCIATED WITH TYPE 2 DIABETES MELLITUS (HCC): Status: ACTIVE | Noted: 2019-03-12

## 2019-03-12 PROBLEM — E78.5 DYSLIPIDEMIA ASSOCIATED WITH TYPE 2 DIABETES MELLITUS  (HCC): Status: ACTIVE | Noted: 2019-03-12

## 2019-03-12 PROBLEM — E78.5 DYSLIPIDEMIA ASSOCIATED WITH TYPE 2 DIABETES MELLITUS: Status: ACTIVE | Noted: 2019-03-12

## 2019-03-12 PROCEDURE — 99213 OFFICE O/P EST LOW 20 MIN: CPT | Performed by: NURSE PRACTITIONER

## 2019-03-12 NOTE — PROGRESS NOTES
Patient presents with:  Diabetes: room-17 cf. pt did bring meter. HPI:   Devang Perez is a 72year old male presenting for type 2 DM medication management.  In the past 3m, DM control has remained well controlled as evidenced A1C 6.5%   No active complaints Allergic rhinitis 2011   • Arthritis 2015   • Asthma 2011   • BLOOD CLOTS    • COPD (chronic obstructive pulmonary disease) ([facility] 2012   • Crohn disease ([facility] 4/15/2014   • Dermatitis due to drugs and medicines taken internally(693.0)    • Diabetes ([facility] N Start date: 1962        Quit date: 1/3/2008        Years since quittin.1      Smokeless tobacco: Never Used    Alcohol use: No      Alcohol/week: 0.0 oz    Drug use: No    Family History   Problem Relation Age of Onset   • Other (Other) Mother MCG/ACT Inhalation Aero Soln Inhale 2 puffs into the lungs every 6 (six) hours as needed for Wheezing or Shortness of Breath.  Disp: 1 Inhaler Rfl: 4   IPRATROPIUM BROMIDE 0.06 % Nasal Solution INSTILL 2 SPRAYS IN EACH NOSTRIL 4 TIMES DAILY AS NEEDED FOR RH Value Date     (H) 03/08/2019    A1C 6.5 (H) 03/08/2019     Weight: 249 lb   Diabetes control is good.   Recommendations:   Continue Metformin 500mg once daily     Informed patient he welcome to follow up in Diabetes center to review carb goals, food

## 2019-03-12 NOTE — ASSESSMENT & PLAN NOTE
A1C :   Lab Results   Component Value Date     (H) 03/08/2019    A1C 6.5 (H) 03/08/2019     Weight: 249 lb   Diabetes control is good.   Recommendations: continue:   Metformin 500mg once daily       Informed patient he welcome to follow up in Diabete

## 2019-03-12 NOTE — PATIENT INSTRUCTIONS
Your A1C: 6.5%     The main goal of diabetes treatment is to keep your sugar from going too high.  We measure your overall blood sugar trends with a Hemoglobin A1C test. (also called an A1C)  For most people the target is less than 7.0% but sometimes we niranjan

## 2019-03-19 ENCOUNTER — TELEPHONE (OUTPATIENT)
Dept: INTERNAL MEDICINE CLINIC | Facility: CLINIC | Age: 74
End: 2019-03-19

## 2019-03-19 DIAGNOSIS — R79.89 ELEVATED SERUM CREATININE: Primary | ICD-10-CM

## 2019-03-19 NOTE — TELEPHONE ENCOUNTER
Pt called and stated that he was supposed to repeat labs in 1 week but when he called there were no orders placed.      Please place orders to devon

## 2019-04-18 RX ORDER — LOSARTAN POTASSIUM 25 MG/1
TABLET ORAL
Qty: 90 TABLET | Refills: 0 | Status: SHIPPED | OUTPATIENT
Start: 2019-04-18 | End: 2019-07-10

## 2019-04-19 ENCOUNTER — TELEPHONE (OUTPATIENT)
Dept: INTERNAL MEDICINE CLINIC | Facility: CLINIC | Age: 74
End: 2019-04-19

## 2019-04-19 NOTE — TELEPHONE ENCOUNTER
Future Appointments   Date Time Provider Mini Barber   5/22/2019  1:00 PM Althea Workman MD [facility]   8/26/2019  1:00 PM Althea Workman MD [facility]     Pt would like BW orders sent Fareed Sandy for [facility] annual.

## 2019-04-23 ENCOUNTER — LAB ENCOUNTER (OUTPATIENT)
Dept: LAB | Facility: HOSPITAL | Age: 74
End: 2019-04-23
Attending: INTERNAL MEDICINE
Payer: MEDICARE

## 2019-04-23 DIAGNOSIS — R79.89 ELEVATED SERUM CREATININE: ICD-10-CM

## 2019-04-23 PROCEDURE — 80048 BASIC METABOLIC PNL TOTAL CA: CPT

## 2019-04-23 PROCEDURE — 36415 COLL VENOUS BLD VENIPUNCTURE: CPT

## 2019-05-09 NOTE — TELEPHONE ENCOUNTER
AS, pt completed labs 3/8/19 and 4/23/19 for cbc,cmp,lipid,a1c and urine micro and a bmp. Do you want additional labs for 5/22/19 appt for Medicare Annual?  Please advise.

## 2019-06-17 RX ORDER — PRAVASTATIN SODIUM 10 MG
TABLET ORAL
Qty: 90 TABLET | Refills: 0 | Status: SHIPPED | OUTPATIENT
Start: 2019-06-17 | End: 2019-09-20

## 2019-07-10 RX ORDER — LOSARTAN POTASSIUM 25 MG/1
TABLET ORAL
Qty: 90 TABLET | Refills: 0 | Status: SHIPPED | OUTPATIENT
Start: 2019-07-10 | End: 2019-10-03

## 2019-07-22 ENCOUNTER — TELEPHONE (OUTPATIENT)
Dept: INTERNAL MEDICINE CLINIC | Facility: CLINIC | Age: 74
End: 2019-07-22

## 2019-08-26 ENCOUNTER — OFFICE VISIT (OUTPATIENT)
Dept: INTERNAL MEDICINE CLINIC | Facility: CLINIC | Age: 74
End: 2019-08-26
Payer: MEDICARE

## 2019-08-26 VITALS
TEMPERATURE: 99 F | HEIGHT: 67.13 IN | BODY MASS INDEX: 37.39 KG/M2 | WEIGHT: 241 LBS | SYSTOLIC BLOOD PRESSURE: 134 MMHG | HEART RATE: 76 BPM | DIASTOLIC BLOOD PRESSURE: 70 MMHG | RESPIRATION RATE: 16 BRPM

## 2019-08-26 DIAGNOSIS — G47.33 OSA TREATED WITH BIPAP: ICD-10-CM

## 2019-08-26 DIAGNOSIS — Z00.00 ENCOUNTER FOR ANNUAL HEALTH EXAMINATION: Primary | ICD-10-CM

## 2019-08-26 DIAGNOSIS — Z12.5 SCREENING PSA (PROSTATE SPECIFIC ANTIGEN): ICD-10-CM

## 2019-08-26 DIAGNOSIS — D59.9 ACQUIRED HEMOLYTIC ANEMIA (HCC): ICD-10-CM

## 2019-08-26 DIAGNOSIS — J30.1 ALLERGIC RHINITIS DUE TO POLLEN, UNSPECIFIED SEASONALITY: ICD-10-CM

## 2019-08-26 DIAGNOSIS — E55.9 VITAMIN D DEFICIENCY: ICD-10-CM

## 2019-08-26 DIAGNOSIS — D80.1 HYPOGAMMAGLOBULINEMIA (HCC): ICD-10-CM

## 2019-08-26 DIAGNOSIS — R94.31 NONSPECIFIC ABNORMAL ELECTROCARDIOGRAM (ECG) (EKG): ICD-10-CM

## 2019-08-26 DIAGNOSIS — R06.81 APNEA: ICD-10-CM

## 2019-08-26 DIAGNOSIS — R80.9 TYPE 2 DIABETES MELLITUS WITH MICROALBUMINURIA, WITHOUT LONG-TERM CURRENT USE OF INSULIN (HCC): ICD-10-CM

## 2019-08-26 DIAGNOSIS — I10 ESSENTIAL HYPERTENSION: ICD-10-CM

## 2019-08-26 DIAGNOSIS — Z86.2 HISTORY OF HEMOLYTIC ANEMIA: ICD-10-CM

## 2019-08-26 DIAGNOSIS — K50.90 CROHN'S DISEASE WITHOUT COMPLICATION, UNSPECIFIED GASTROINTESTINAL TRACT LOCATION (HCC): ICD-10-CM

## 2019-08-26 DIAGNOSIS — E11.29 TYPE 2 DIABETES MELLITUS WITH MICROALBUMINURIA, WITHOUT LONG-TERM CURRENT USE OF INSULIN (HCC): ICD-10-CM

## 2019-08-26 DIAGNOSIS — E11.69 DYSLIPIDEMIA ASSOCIATED WITH TYPE 2 DIABETES MELLITUS (HCC): ICD-10-CM

## 2019-08-26 DIAGNOSIS — M06.4 INFLAMMATORY POLYARTHRITIS (HCC): ICD-10-CM

## 2019-08-26 DIAGNOSIS — E78.5 DYSLIPIDEMIA ASSOCIATED WITH TYPE 2 DIABETES MELLITUS (HCC): ICD-10-CM

## 2019-08-26 PROCEDURE — G0439 PPPS, SUBSEQ VISIT: HCPCS | Performed by: INTERNAL MEDICINE

## 2019-08-26 NOTE — PATIENT INSTRUCTIONS
Janet Daily III's SCREENING SCHEDULE   Tests on this list are recommended by your physician but may not be covered, or covered at this frequency, by your insurer. Please check with your insurance carrier before scheduling to verify coverage. aneurysm screening (once between ages 73-68)  No results found for this or any previous visit.  Limited to patients who meet one of the following criteria:   • Men who are 73-68 years old and have smoked more than 100 cigarettes in their lifetime   • Anyone (Pneumovax)  Covered Once after 65 Orders placed or performed in visit on 10/15/13   • PNEUMOCOCCAL IMMUNIZATION    Please get once after your 65th birthday    Hepatitis B for Moderate/High Risk No orders found for this or any previous visit.  Medium/high r

## 2019-08-26 NOTE — PROGRESS NOTES
HPI:   Roberta Piedra III is a 69year old male who presents for a Medicare Subsequent Annual Wellness visit (Pt already had Initial Annual Wellness). Here for awv. Pt is doing well. Has stable chronic issues. Taking meds no se's.    His las Never Used         CAGE Alcohol screening   Marbella Solis III was screened for Alcohol abuse and had a score of 0 so is at low risk.      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 Weight as of this encounter: 241 lb.     Medicare Manda Lyme 10/15/2013        ASSESSMENT AND OTHER RELEVANT CHRONIC CONDITIONS:   Nadiya Jones III is a 69year old male who presents for a Medicare Assessment.      PLAN SUMMARY:   Diagnoses and all orders for this visit:    Encounter for annual health examin positive mental well-being?: (P) Social Interaction    This section provided for quick review of chart, separate sheet to patient  [address] Internal Lab or Procedure External Lab or Procedure   Diabetes Screening vaccine history found Medium/high risk factors:   End-stage renal disease   Hemophiliacs who received Factor VIII or IX concentrates   Clients of institutions for the mentally retarded   Persons who live in the same house as a HepB virus carrier   Homosexu

## 2019-09-20 RX ORDER — PRAVASTATIN SODIUM 10 MG
TABLET ORAL
Qty: 90 TABLET | Refills: 0 | Status: SHIPPED | OUTPATIENT
Start: 2019-09-20 | End: 2019-12-09

## 2019-09-20 NOTE — TELEPHONE ENCOUNTER
Last Ov: 8/26/19, AS, physical  Upcoming appt: no upcoming appt  Last labs: BMP 4/23/19  Last Rx: pravastatin 10mg, #90, 0R 6/17/19    Per Protocol, passed. Refill sent.

## 2019-10-03 RX ORDER — LOSARTAN POTASSIUM 25 MG/1
TABLET ORAL
Qty: 90 TABLET | Refills: 0 | Status: SHIPPED | OUTPATIENT
Start: 2019-10-03 | End: 2019-12-24

## 2019-10-03 NOTE — TELEPHONE ENCOUNTER
Per Protocol, passed. Refill sent. Pt has incomplete labs from 8/2019, [facility] message sent as reminder.

## 2019-10-08 ENCOUNTER — TELEPHONE (OUTPATIENT)
Dept: INTERNAL MEDICINE CLINIC | Facility: CLINIC | Age: 74
End: 2019-10-08

## 2019-10-08 ENCOUNTER — LAB ENCOUNTER (OUTPATIENT)
Dept: LAB | Facility: HOSPITAL | Age: 74
End: 2019-10-08
Attending: INTERNAL MEDICINE
Payer: MEDICARE

## 2019-10-08 DIAGNOSIS — R80.9 TYPE 2 DIABETES MELLITUS WITH MICROALBUMINURIA, WITHOUT LONG-TERM CURRENT USE OF INSULIN (HCC): ICD-10-CM

## 2019-10-08 DIAGNOSIS — E11.69 DYSLIPIDEMIA ASSOCIATED WITH TYPE 2 DIABETES MELLITUS (HCC): ICD-10-CM

## 2019-10-08 DIAGNOSIS — E11.29 TYPE 2 DIABETES MELLITUS WITH MICROALBUMINURIA, WITHOUT LONG-TERM CURRENT USE OF INSULIN (HCC): ICD-10-CM

## 2019-10-08 DIAGNOSIS — Z12.5 SCREENING PSA (PROSTATE SPECIFIC ANTIGEN): ICD-10-CM

## 2019-10-08 DIAGNOSIS — E78.5 DYSLIPIDEMIA ASSOCIATED WITH TYPE 2 DIABETES MELLITUS (HCC): ICD-10-CM

## 2019-10-08 DIAGNOSIS — I10 ESSENTIAL HYPERTENSION: ICD-10-CM

## 2019-10-08 PROCEDURE — 84443 ASSAY THYROID STIM HORMONE: CPT

## 2019-10-08 PROCEDURE — 85025 COMPLETE CBC W/AUTO DIFF WBC: CPT

## 2019-10-08 PROCEDURE — 80061 LIPID PANEL: CPT

## 2019-10-08 PROCEDURE — 83036 HEMOGLOBIN GLYCOSYLATED A1C: CPT

## 2019-10-08 PROCEDURE — 80053 COMPREHEN METABOLIC PANEL: CPT

## 2019-10-08 PROCEDURE — 36415 COLL VENOUS BLD VENIPUNCTURE: CPT

## 2019-10-08 NOTE — TELEPHONE ENCOUNTER
Pt called was at lab today and was informed that Medicare isn't going to pay for A1C (last 3/08/2019) and PSA (4/18/2018) labs. Pt stated they are holding his labs for 48hrs. Pt stated the diagnosis is wrong and needs to be changed and is confused.   [place]

## 2019-10-08 NOTE — TELEPHONE ENCOUNTER
Spoke to [place] at [facility] lab, not explained abn's properly to pt, they will run the PSA since it has been greater than 1 year since his last PSA 4/2018, a1c already ran. Pt verbalizes understanding.

## 2019-10-11 ENCOUNTER — PATIENT MESSAGE (OUTPATIENT)
Dept: INTERNAL MEDICINE CLINIC | Facility: CLINIC | Age: 74
End: 2019-10-11

## 2019-10-11 NOTE — TELEPHONE ENCOUNTER
From: Evon Squires III  To: Sailaja Spence MD  Sent: 10/11/2019 3:51 PM CDT  Subject: Non-Urgent Medical Question    I am taking 150mg Ranitidine daily for GERD symptoms.  I have heard there may be a problem with Ranitidine, should I be concerned

## 2019-10-15 RX ORDER — FAMOTIDINE 20 MG/1
20 TABLET ORAL DAILY
Qty: 30 TABLET | Refills: 0 | Status: ON HOLD | COMMUNITY
Start: 2019-10-15 | End: 2021-07-28

## 2019-10-23 ENCOUNTER — OFFICE VISIT (OUTPATIENT)
Dept: ENDOCRINOLOGY CLINIC | Facility: CLINIC | Age: 74
End: 2019-10-23
Payer: MEDICARE

## 2019-10-23 VITALS
BODY MASS INDEX: 36.68 KG/M2 | HEIGHT: 68 IN | OXYGEN SATURATION: 98 % | DIASTOLIC BLOOD PRESSURE: 80 MMHG | WEIGHT: 242 LBS | SYSTOLIC BLOOD PRESSURE: 150 MMHG | RESPIRATION RATE: 18 BRPM | HEART RATE: 94 BPM

## 2019-10-23 DIAGNOSIS — R80.9 TYPE 2 DIABETES MELLITUS WITH MICROALBUMINURIA, WITHOUT LONG-TERM CURRENT USE OF INSULIN (HCC): Primary | ICD-10-CM

## 2019-10-23 DIAGNOSIS — E11.29 TYPE 2 DIABETES MELLITUS WITH MICROALBUMINURIA, WITHOUT LONG-TERM CURRENT USE OF INSULIN (HCC): Primary | ICD-10-CM

## 2019-10-23 PROCEDURE — 99214 OFFICE O/P EST MOD 30 MIN: CPT | Performed by: NURSE PRACTITIONER

## 2019-10-23 NOTE — PROGRESS NOTES
Patient presents with:  Diabetes: cf. pt has meter. HPI:   Niya Ruggiero is a 69year old male presenting for type 2 DM medication management.  In the past 3m, DM trends have increased to 7.8% ( 10-8-2019 ) ~ last A1C was  6.5% 3-2019  )   Admits he has been no  Nephropathy: no    Macrovascular:  PVD: no  CAD: no  Stroke/CVA: no    Modifying factors:  Medication adherence: Yes   Nutrition: not watching portions. Exercise: active   Recent steroids, illness or infections: no     Allergies: Atovaquone;  Bactrim Date   • BRONCHOSCOPY,BIOPSY  2/2011   • COLONOSCOPY      x 2 at age 50 and 56   • COLONOSCOPY  Aug. 2014    and EGD   • COLONOSCOPY N/A 2/9/2014    Performed by Mirian Jacobo MD at [facility] ENDOSCOPY   • SKIN SURGERY  2009    tags, moles   • VASECTOMY  199 (ALLEGRA OR) Take 1 tablet by mouth daily. • Omeprazole 20 MG Oral Tab EC Take 1 tablet by mouth daily. • Cholecalciferol (VITAMIN D3) 1000 UNITS Oral Cap Take 1 tablet by mouth daily.      • folic acid (FOLVITE) 714 MCG Oral Tab Take 1,600 mcg by m class  Discussed incretin rx.  No hx pancreatitis   Will start Januvia 100mg once daily   Place professional Da CGM to monitor 24 hour glucose trends   Reminded pt on A1C and blood sugar targets (Fasting < 130 and post prandial <224 ) and complications a

## 2019-10-23 NOTE — PROGRESS NOTES
A continuous glucose sensor for 24 hour blood sugar monitoring was placed on patient today per APN order.    Serial NUMBER: [id number]  Exp date: (2/29/20)  - After cleansing skin with alcohol prep, Sensor (NAVI)was inserted on  RIGHT Posterior upper arm

## 2019-10-23 NOTE — PATIENT INSTRUCTIONS
You trends are higher  The A1C is not bad but your daily trends are above 200 which is concerning     We will increase Metformin to 500mg twice daily     Start Januvia 100mg once daily       This medication should help stimulate your pancreas to produce in ok to  place in a bag and bring it back to your appointment. Always keep the sensor since we may still have enough information in the sensor to review your trends.      If you have to go for any xrays, MRI or CT scans, the sensor will have to be removed

## 2019-11-06 ENCOUNTER — OFFICE VISIT (OUTPATIENT)
Dept: ENDOCRINOLOGY CLINIC | Facility: CLINIC | Age: 74
End: 2019-11-06
Payer: MEDICARE

## 2019-11-06 VITALS
SYSTOLIC BLOOD PRESSURE: 136 MMHG | HEART RATE: 86 BPM | DIASTOLIC BLOOD PRESSURE: 70 MMHG | HEIGHT: 68 IN | WEIGHT: 239 LBS | RESPIRATION RATE: 20 BRPM | BODY MASS INDEX: 36.22 KG/M2

## 2019-11-06 DIAGNOSIS — R80.9 TYPE 2 DIABETES MELLITUS WITH MICROALBUMINURIA, WITHOUT LONG-TERM CURRENT USE OF INSULIN (HCC): Primary | ICD-10-CM

## 2019-11-06 DIAGNOSIS — E11.29 TYPE 2 DIABETES MELLITUS WITH MICROALBUMINURIA, WITHOUT LONG-TERM CURRENT USE OF INSULIN (HCC): Primary | ICD-10-CM

## 2019-11-06 PROCEDURE — 99214 OFFICE O/P EST MOD 30 MIN: CPT | Performed by: NURSE PRACTITIONER

## 2019-11-06 PROCEDURE — 95250 CONT GLUC MNTR PHYS/QHP EQP: CPT | Performed by: NURSE PRACTITIONER

## 2019-11-06 PROCEDURE — 95251 CONT GLUC MNTR ANALYSIS I&R: CPT | Performed by: NURSE PRACTITIONER

## 2019-11-06 NOTE — PATIENT INSTRUCTIONS
Stop the [place] and [place] ~ not sure it is very effective. We will increase Metformin   Increase dose  Metformin XR 500m tabs twice daily   Metformin increases your insulin sensitivity which means it will help the insulin in your body work more effectively.

## 2019-11-06 NOTE — PROGRESS NOTES
Patient presents with:  Diabetes: cf. pt did bring prof Bradford Garcia      HPI:   Shimon Castillo is a 69year old male presenting for type 2 DM medication management.  In the past 2 weeks his BG trends have improved    januvia was initiated at last DM appt - very sensitiv (H) 03/08/2019    CREATSERUM 1.28 10/08/2019    GFRNAA 56 (L) 10/08/2019    GFRAA 64 10/08/2019       SMBG patterns: One touch  meter   Reviewed liborio - see above     DM associated symptoms:   Polyuria, polyphagia, polydipsia: no  Paresthesias: yes- transi fasciitis    • Pneumonia, organism unspecified(486)    • Postnasal drip    • Psoriasis    • PULMONARY EMBOLISM    • Sleep apnea, obstructive EDW  DX   5-06-11/ TX 7-15-11    AHI  36.4/ supine  115/ krysten  80%/ BIPAP 13/9 / Sleep  RX    • Unspecified essent 0   • LOSARTAN POTASSIUM 25 MG Oral Tab TAKE 1 TABLET BY MOUTH EVERY DAY 90 tablet 0   • PRAVASTATIN SODIUM 10 MG Oral Tab TAKE 1 TABLET BY MOUTH EVERY DAY IN THE EVENING 90 tablet 0   • Budesonide-Formoterol Fumarate (SYMBICORT) 160-4.5 MCG/ACT Inhalation kg/m². Physical Exam   Vitals reviewed. Constitutional: He is oriented to person, place, and time and obese. He appears well-developed. Cardiovascular: Normal rate and regular rhythm.     Neurological: He is alert and oriented to person, place, and shama recommended every 6 months

## 2019-12-09 RX ORDER — PRAVASTATIN SODIUM 10 MG
TABLET ORAL
Qty: 90 TABLET | Refills: 1 | Status: SHIPPED | OUTPATIENT
Start: 2019-12-09 | End: 2020-03-03

## 2019-12-24 RX ORDER — LOSARTAN POTASSIUM 25 MG/1
TABLET ORAL
Qty: 90 TABLET | Refills: 1 | Status: SHIPPED | OUTPATIENT
Start: 2019-12-24 | End: 2020-07-06

## 2020-01-29 ENCOUNTER — OFFICE VISIT (OUTPATIENT)
Dept: ENDOCRINOLOGY CLINIC | Facility: CLINIC | Age: 75
End: 2020-01-29
Payer: MEDICARE

## 2020-01-29 VITALS
DIASTOLIC BLOOD PRESSURE: 60 MMHG | BODY MASS INDEX: 35.61 KG/M2 | WEIGHT: 235 LBS | HEIGHT: 68 IN | SYSTOLIC BLOOD PRESSURE: 110 MMHG | HEART RATE: 87 BPM

## 2020-01-29 DIAGNOSIS — E11.29 TYPE 2 DIABETES MELLITUS WITH MICROALBUMINURIA, WITHOUT LONG-TERM CURRENT USE OF INSULIN (HCC): Primary | ICD-10-CM

## 2020-01-29 DIAGNOSIS — R80.9 TYPE 2 DIABETES MELLITUS WITH MICROALBUMINURIA, WITHOUT LONG-TERM CURRENT USE OF INSULIN (HCC): Primary | ICD-10-CM

## 2020-01-29 LAB
CARTRIDGE LOT#: 588 NUMERIC
HEMOGLOBIN A1C: 6.3 % (ref 4.3–5.6)

## 2020-01-29 PROCEDURE — 83036 HEMOGLOBIN GLYCOSYLATED A1C: CPT | Performed by: NURSE PRACTITIONER

## 2020-01-29 PROCEDURE — 99214 OFFICE O/P EST MOD 30 MIN: CPT | Performed by: NURSE PRACTITIONER

## 2020-01-29 NOTE — PATIENT INSTRUCTIONS
Your A1C: 6.3%   This is really good for you ~ down from 7.8%     The main goal of diabetes treatment is to keep your sugar from going too high.  We measure your overall blood sugar trends with a Hemoglobin A1C test. (also called an A1C)  For most people th

## 2020-01-29 NOTE — PROGRESS NOTES
Patient presents with:  Diabetes: cf. pt did bring meter. HPI:   Garcia Calle is a 69year old male presenting for type 2 DM medication management.  In the past 3 m his DM control has improved, as reflected by A1C 6.3% today (last A1C 7.8%)   In the past ye embolism and thrombosis of unspecified deep vessels of lower extremity    • Allergic rhinitis 2011   • Arthritis 2015   • Asthma 2011   • BLOOD CLOTS    • COPD (chronic obstructive pulmonary disease) ([facility] 2012   • Crohn disease ([facility] 4/15/2014   • MariaT eresa Garibay Years: 40.00        Pack years: 61        Types: Cigarettes, Pipe, Cigars        Start date: 1962        Quit date: 1/3/2008        Years since quittin.0      Smokeless tobacco: Never Used    Alcohol use: No      Alcohol/week: 0.0 standard drinks 1/29/2020 ) 3 Inhaler 3   • Chlorpheniramine-DM (CORICIDIN HBP COUGH/COLD) 4-30 MG Oral Tab      • Budesonide-Formoterol Fumarate (SYMBICORT) 160-4.5 MCG/ACT Inhalation Aerosol Inhale 2 puffs into the lungs 2 (two) times daily.  (Patient not taking: Hilda Tariq pulse pedal pulse exam      Neurological: He is alert and oriented to person, place, and time.        Assessment/Plan:  Type 2 diabetes mellitus with microalbuminuria, not on insulin  ([facility])  A1C: 6.3% (last A1C 7.8%)    Weight: 235 lb (last weight 239 lb )

## 2020-03-03 RX ORDER — PRAVASTATIN SODIUM 10 MG
TABLET ORAL
Qty: 90 TABLET | Refills: 0 | Status: SHIPPED | OUTPATIENT
Start: 2020-03-03 | End: 2020-05-26

## 2020-05-26 RX ORDER — PRAVASTATIN SODIUM 10 MG
TABLET ORAL
Qty: 90 TABLET | Refills: 0 | Status: SHIPPED | OUTPATIENT
Start: 2020-05-26 | End: 2020-08-19

## 2020-07-06 RX ORDER — LOSARTAN POTASSIUM 25 MG/1
TABLET ORAL
Qty: 90 TABLET | Refills: 1 | Status: SHIPPED | OUTPATIENT
Start: 2020-07-06 | End: 2020-12-16 | Stop reason: DRUGHIGH

## 2020-07-06 NOTE — TELEPHONE ENCOUNTER
Last OV 8.26.19 w/ AS (annual pe)   Last PE 8.26.19   Last REFILL 12.24.19 Losartan 25mg #90 1R  Last LABS 10.8. 19 psa screen, tsh w/reflex, hga1c, lipid, cmp, cbc     Future Appointments   Date Time Provider Mini Barber   12/3/2020 11:20 AM Annette Robertson,

## 2020-08-19 RX ORDER — PRAVASTATIN SODIUM 10 MG
TABLET ORAL
Qty: 90 TABLET | Refills: 0 | Status: SHIPPED | OUTPATIENT
Start: 2020-08-19 | End: 2020-11-10

## 2020-08-19 NOTE — TELEPHONE ENCOUNTER
PASSED per protocol, refill sent.   Last PE 8.26.19-routed to front to schedule  Future Appointments   Date Time Provider [facility]   12/3/2020 11:20 AM MD JUAN MANUEL Polanco

## 2020-08-20 ENCOUNTER — TELEPHONE (OUTPATIENT)
Dept: INTERNAL MEDICINE CLINIC | Facility: CLINIC | Age: 75
End: 2020-08-20

## 2020-08-25 NOTE — TELEPHONE ENCOUNTER
Future Appointments   Date Time Provider Mini Barber   9/2/2020 11:30 AM Shady Mireles PA-C [facility]   12/3/2020 11:20 AM Jl Philip MD [facility]

## 2020-08-31 ENCOUNTER — LAB ENCOUNTER (OUTPATIENT)
Dept: LAB | Facility: HOSPITAL | Age: 75
End: 2020-08-31
Attending: NURSE PRACTITIONER
Payer: MEDICARE

## 2020-08-31 DIAGNOSIS — E11.29 TYPE 2 DIABETES MELLITUS WITH MICROALBUMINURIA, WITHOUT LONG-TERM CURRENT USE OF INSULIN (HCC): ICD-10-CM

## 2020-08-31 DIAGNOSIS — I10 ESSENTIAL HYPERTENSION: ICD-10-CM

## 2020-08-31 DIAGNOSIS — R80.9 TYPE 2 DIABETES MELLITUS WITH MICROALBUMINURIA, WITHOUT LONG-TERM CURRENT USE OF INSULIN (HCC): ICD-10-CM

## 2020-08-31 DIAGNOSIS — E78.5 DYSLIPIDEMIA ASSOCIATED WITH TYPE 2 DIABETES MELLITUS (HCC): ICD-10-CM

## 2020-08-31 DIAGNOSIS — E11.69 DYSLIPIDEMIA ASSOCIATED WITH TYPE 2 DIABETES MELLITUS (HCC): ICD-10-CM

## 2020-08-31 DIAGNOSIS — Z12.5 SCREENING PSA (PROSTATE SPECIFIC ANTIGEN): ICD-10-CM

## 2020-08-31 LAB
ALBUMIN SERPL-MCNC: 3.9 G/DL (ref 3.4–5)
ALBUMIN/GLOB SERPL: 1.3 {RATIO} (ref 1–2)
ALP LIVER SERPL-CCNC: 65 U/L (ref 45–117)
ALT SERPL-CCNC: 45 U/L (ref 16–61)
ANION GAP SERPL CALC-SCNC: 7 MMOL/L (ref 0–18)
AST SERPL-CCNC: 23 U/L (ref 15–37)
BILIRUB SERPL-MCNC: 0.6 MG/DL (ref 0.1–2)
BUN BLD-MCNC: 15 MG/DL (ref 7–18)
BUN/CREAT SERPL: 12.9 (ref 10–20)
CALCIUM BLD-MCNC: 9.5 MG/DL (ref 8.5–10.1)
CHLORIDE SERPL-SCNC: 104 MMOL/L (ref 98–112)
CO2 SERPL-SCNC: 27 MMOL/L (ref 21–32)
COMPLEXED PSA SERPL-MCNC: 7.19 NG/ML (ref ?–4)
CREAT BLD-MCNC: 1.16 MG/DL (ref 0.7–1.3)
CREAT UR-SCNC: 93.7 MG/DL
GLOBULIN PLAS-MCNC: 3.1 G/DL (ref 2.8–4.4)
GLUCOSE BLD-MCNC: 152 MG/DL (ref 70–99)
M PROTEIN MFR SERPL ELPH: 7 G/DL (ref 6.4–8.2)
MICROALBUMIN UR-MCNC: 7.46 MG/DL
MICROALBUMIN/CREAT 24H UR-RTO: 79.6 UG/MG (ref ?–30)
OSMOLALITY SERPL CALC.SUM OF ELEC: 290 MOSM/KG (ref 275–295)
PATIENT FASTING Y/N/NP: YES
POTASSIUM SERPL-SCNC: 4.2 MMOL/L (ref 3.5–5.1)
SODIUM SERPL-SCNC: 138 MMOL/L (ref 136–145)

## 2020-08-31 PROCEDURE — 80053 COMPREHEN METABOLIC PANEL: CPT

## 2020-08-31 PROCEDURE — 82570 ASSAY OF URINE CREATININE: CPT

## 2020-08-31 PROCEDURE — 36415 COLL VENOUS BLD VENIPUNCTURE: CPT

## 2020-08-31 PROCEDURE — 82043 UR ALBUMIN QUANTITATIVE: CPT

## 2020-09-02 ENCOUNTER — OFFICE VISIT (OUTPATIENT)
Dept: INTERNAL MEDICINE CLINIC | Facility: CLINIC | Age: 75
End: 2020-09-02
Payer: MEDICARE

## 2020-09-02 VITALS
WEIGHT: 234 LBS | OXYGEN SATURATION: 97 % | RESPIRATION RATE: 18 BRPM | HEIGHT: 68 IN | DIASTOLIC BLOOD PRESSURE: 74 MMHG | HEART RATE: 64 BPM | TEMPERATURE: 98 F | SYSTOLIC BLOOD PRESSURE: 138 MMHG | BODY MASS INDEX: 35.46 KG/M2

## 2020-09-02 DIAGNOSIS — E55.9 VITAMIN D DEFICIENCY: ICD-10-CM

## 2020-09-02 DIAGNOSIS — D80.1 HYPOGAMMAGLOBULINEMIA (HCC): ICD-10-CM

## 2020-09-02 DIAGNOSIS — G47.33 OSA TREATED WITH BIPAP: ICD-10-CM

## 2020-09-02 DIAGNOSIS — E78.5 DYSLIPIDEMIA ASSOCIATED WITH TYPE 2 DIABETES MELLITUS (HCC): ICD-10-CM

## 2020-09-02 DIAGNOSIS — K50.90 CROHN'S DISEASE WITHOUT COMPLICATION, UNSPECIFIED GASTROINTESTINAL TRACT LOCATION (HCC): ICD-10-CM

## 2020-09-02 DIAGNOSIS — E11.29 TYPE 2 DIABETES MELLITUS WITH MICROALBUMINURIA, WITHOUT LONG-TERM CURRENT USE OF INSULIN (HCC): ICD-10-CM

## 2020-09-02 DIAGNOSIS — Z00.00 ENCOUNTER FOR ANNUAL HEALTH EXAMINATION: Primary | ICD-10-CM

## 2020-09-02 DIAGNOSIS — E11.69 DYSLIPIDEMIA ASSOCIATED WITH TYPE 2 DIABETES MELLITUS (HCC): ICD-10-CM

## 2020-09-02 DIAGNOSIS — R80.9 TYPE 2 DIABETES MELLITUS WITH MICROALBUMINURIA, WITHOUT LONG-TERM CURRENT USE OF INSULIN (HCC): ICD-10-CM

## 2020-09-02 DIAGNOSIS — R97.20 ELEVATED PSA: ICD-10-CM

## 2020-09-02 DIAGNOSIS — M06.4 INFLAMMATORY POLYARTHRITIS (HCC): ICD-10-CM

## 2020-09-02 DIAGNOSIS — J30.1 ALLERGIC RHINITIS DUE TO POLLEN, UNSPECIFIED SEASONALITY: ICD-10-CM

## 2020-09-02 DIAGNOSIS — I10 ESSENTIAL HYPERTENSION: ICD-10-CM

## 2020-09-02 DIAGNOSIS — Z86.2 HISTORY OF HEMOLYTIC ANEMIA: ICD-10-CM

## 2020-09-02 PROCEDURE — 99213 OFFICE O/P EST LOW 20 MIN: CPT | Performed by: PHYSICIAN ASSISTANT

## 2020-09-02 PROCEDURE — G0439 PPPS, SUBSEQ VISIT: HCPCS | Performed by: PHYSICIAN ASSISTANT

## 2020-09-02 NOTE — PROGRESS NOTES
HPI:   Florence Moreau III is a 70year old male who presents for a Medicare Subsequent Annual Wellness visit (Pt already had Initial Annual Wellness). Pt doing well. No new issues.       His last annual assessment has been over 1 year: Annual P had a score of 0 so is at low risk.      Patient Care Team: Patient Care Team:  Fatou Sylvester MD as PCP - Cristian Wilson MD as PCP - Rose Galaviz MD (Otolaryngologist (ENT))  Earnest Olmos DO ([facility])  Luis Rosales, Results   Component Value Date    WBC 7.2 10/08/2019    HGB 13.3 10/08/2019    .0 10/08/2019        ALLERGIES:   He is allergic to atovaquone; bactrim; sulfa antibiotics; and sulfa drugs cross reactors.     CURRENT MEDICATIONS:   PRAVASTATIN SODIUM 1 Elevated blood pressure reading without diagnosis of hypertension, Esophageal reflux, Extrinsic asthma, unspecified, Hemolytic anemia ([facility] Hepatitis, unspecified, Hereditary hemolytic anemia, unspecified ([facility] History of blood transfusion, HOSPITALIZATI back pain  NEURO: denies headaches  PSYCHE: denies depression or anxiety  HEMATOLOGIC: denies hx of anemia  ENDOCRINE: denies thyroid history  ALL/ASTHMA: denies hx of allergy or asthma    EXAM:   /74   Pulse 64   Temp 97.7 °F (36.5 °C)   Resp 18   H (Prevnar 13) 12/17/2015   • Pneumovax 23 10/15/2013        ASSESSMENT AND OTHER RELEVANT CHRONIC CONDITIONS:   Gita Das III is a 70year old male who presents for a Medicare Assessment.      PLAN SUMMARY:   Diagnoses and all orders for this vis level?: Appropriate Exercise;Stretching  How would you describe your daily physical activity?: Light  How would you describe your current health state?: Fair  How do you maintain positive mental well-being?: Social Interaction    This section provided for (Pneumovax)  Covered Once after 65 10/15/2013 Please get once after your 65th birthday    Hepatitis B for Moderate/High Risk No vaccine history found Medium/high risk factors:   End-stage renal disease   Hemophiliacs who received Factor VIII or IX concentr

## 2020-09-02 NOTE — PATIENT INSTRUCTIONS
Rosalia Barajas III's SCREENING SCHEDULE   Tests on this list are recommended by your physician but may not be covered, or covered at this frequency, by your insurer. Please check with your insurance carrier before scheduling to verify coverage. results found for this or any previous visit.  Limited to patients who meet one of the following criteria:   • Men who are 73-68 years old and have smoked more than 100 cigarettes in their lifetime   • Anyone with a family history    Colorectal Cancer Scree performed in visit on 10/15/13   • PNEUMOCOCCAL IMMUNIZATION    Please get once after your 65th birthday    Hepatitis B for Moderate/High Risk No orders found for this or any previous visit.  Medium/high risk factors:   End-stage renal disease   Hemophiliac

## 2020-09-10 DIAGNOSIS — D64.9 ANEMIA, UNSPECIFIED TYPE: Primary | ICD-10-CM

## 2020-09-10 LAB
ABSOLUTE BASOPHILS: 51 CELLS/UL (ref 0–200)
ABSOLUTE EOSINOPHILS: 102 CELLS/UL (ref 15–500)
ABSOLUTE LYMPHOCYTES: 2323 CELLS/UL (ref 850–3900)
ABSOLUTE MONOCYTES: 570 CELLS/UL (ref 200–950)
ABSOLUTE NEUTROPHILS: 3354 CELLS/UL (ref 1500–7800)
BASOPHILS: 0.8 %
CHOL/HDLC RATIO: 4 (CALC)
CHOLESTEROL, TOTAL: 161 MG/DL
EOSINOPHILS: 1.6 %
HDL CHOLESTEROL: 40 MG/DL
HEMATOCRIT: 36.8 % (ref 38.5–50)
HEMOGLOBIN: 12.3 G/DL (ref 13.2–17.1)
LDL-CHOLESTEROL: 83 MG/DL (CALC)
LYMPHOCYTES: 36.3 %
MCH: 29.9 PG (ref 27–33)
MCHC: 33.4 G/DL (ref 32–36)
MCV: 89.3 FL (ref 80–100)
MONOCYTES: 8.9 %
MPV: 9.8 FL (ref 7.5–12.5)
NEUTROPHILS: 52.4 %
NON-HDL CHOLESTEROL: 121 MG/DL (CALC)
PLATELET COUNT: 214 THOUSAND/UL (ref 140–400)
RDW: 14.4 % (ref 11–15)
RED BLOOD CELL COUNT: 4.12 MILLION/UL (ref 4.2–5.8)
TRIGLYCERIDES: 318 MG/DL
VITAMIN D, 25-OH, TOTAL: 52 NG/ML (ref 30–100)
WHITE BLOOD CELL COUNT: 6.4 THOUSAND/UL (ref 3.8–10.8)

## 2020-09-10 NOTE — PROGRESS NOTES
TGs are up. Needs to work on diet (decreasing conc sweets and simple carbs). LDL improved and HDL better as well. New anemia. I need pt to do additional labs. I have ordered them, then further recs will be made regarding anemia.   Vit D is normal.

## 2020-09-14 ENCOUNTER — APPOINTMENT (OUTPATIENT)
Dept: LAB | Facility: HOSPITAL | Age: 75
End: 2020-09-14
Attending: PHYSICIAN ASSISTANT
Payer: MEDICARE

## 2020-09-14 DIAGNOSIS — D64.9 ANEMIA, UNSPECIFIED TYPE: Primary | ICD-10-CM

## 2020-09-14 LAB
DEPRECATED HBV CORE AB SER IA-ACNC: 140.6 NG/ML (ref 30–530)
FOLATE SERPL-MCNC: 45.9 NG/ML (ref 8.7–?)
HGB RETIC QN AUTO: 35.4 PG (ref 28.2–36.6)
IMM RETICS NFR: 0.2 RATIO (ref 0.1–0.3)
IRON SATURATION: 14 % (ref 20–50)
IRON SERPL-MCNC: 48 UG/DL (ref 65–175)
RETICS # AUTO: 103.6 X10(3) UL (ref 22.5–147.5)
RETICS/RBC NFR AUTO: 2.4 % (ref 0.5–2.5)
TOTAL IRON BINDING CAPACITY: 341 UG/DL (ref 240–450)
TRANSFERRIN SERPL-MCNC: 229 MG/DL (ref 200–360)
VIT B12 SERPL-MCNC: 532 PG/ML (ref 193–986)

## 2020-09-14 PROCEDURE — 82728 ASSAY OF FERRITIN: CPT | Performed by: PHYSICIAN ASSISTANT

## 2020-09-14 PROCEDURE — 36415 COLL VENOUS BLD VENIPUNCTURE: CPT | Performed by: PHYSICIAN ASSISTANT

## 2020-09-14 PROCEDURE — 83550 IRON BINDING TEST: CPT | Performed by: PHYSICIAN ASSISTANT

## 2020-09-14 PROCEDURE — 85045 AUTOMATED RETICULOCYTE COUNT: CPT | Performed by: PHYSICIAN ASSISTANT

## 2020-09-14 PROCEDURE — 83540 ASSAY OF IRON: CPT | Performed by: PHYSICIAN ASSISTANT

## 2020-09-14 PROCEDURE — 82607 VITAMIN B-12: CPT | Performed by: PHYSICIAN ASSISTANT

## 2020-09-14 PROCEDURE — 82746 ASSAY OF FOLIC ACID SERUM: CPT | Performed by: PHYSICIAN ASSISTANT

## 2020-09-14 NOTE — PROGRESS NOTES
Not clearly iron def per labs. Repeat CBC in 2-3 weeks. Further w/u if remains anemic. K13 and folic acid were added to blood that was drawn today.

## 2020-09-15 NOTE — PROGRESS NOTES
No as per result note from 9/14/20 at 3:30 pm,  his iron studies do not clearly indicate iron def at this point.

## 2020-09-22 NOTE — H&P
HPI:     Florence Moreau III is a 70year old male (lives [place] miles away, [facility]) with a PMH of HTN, DM, DVT/PE in 2011 during episode of hemolytic anemia (no blood thinners since 2012), COPD, MOMO, Crohn's disease, vasectomy.  He presents as a co • OBESITY    • Otalgia, unspecified    • Other and unspecified hyperlipidemia    • OTHER DISEASES     autoimmune hemolytic pneumonia   • OTHER DISEASES     PCP pneumonia   • OTHER DISEASES     PE   • OTHER DISEASES     eczema   • Other specified disorder Oral Tab TAKE 1 TABLET BY MOUTH EVERY DAY IN THE EVENING 90 tablet 0   • METFORMIN HCL 1000 MG Oral Tab TAKE 1 TABLET BY MOUTH TWICE A  tablet 1   • LOSARTAN POTASSIUM 25 MG Oral Tab TAKE 1 TABLET BY MOUTH EVERY DAY 90 tablet 1   • Psyllium [facility] no obvious goiter or masses  LUNGS: nonlabored breathing  ABDOMEN: soft, no obvious masses or tenderness  SKIN: no obvious rashes    DIGITAL RECTAL EXAM: normal  PROSTATE: ~ 40-50 grams, symmetric, soft, non-nodular, non-tender       ASSESSMENT/PLAN:   Mary

## 2020-09-28 ENCOUNTER — LAB ENCOUNTER (OUTPATIENT)
Dept: LAB | Facility: HOSPITAL | Age: 75
End: 2020-09-28
Attending: UROLOGY
Payer: MEDICARE

## 2020-09-28 ENCOUNTER — OFFICE VISIT (OUTPATIENT)
Dept: SURGERY | Facility: CLINIC | Age: 75
End: 2020-09-28
Payer: MEDICARE

## 2020-09-28 VITALS — SYSTOLIC BLOOD PRESSURE: 151 MMHG | HEART RATE: 79 BPM | DIASTOLIC BLOOD PRESSURE: 86 MMHG

## 2020-09-28 DIAGNOSIS — R97.20 ELEVATED PSA: Primary | ICD-10-CM

## 2020-09-28 DIAGNOSIS — N52.9 ERECTILE DYSFUNCTION, UNSPECIFIED ERECTILE DYSFUNCTION TYPE: ICD-10-CM

## 2020-09-28 LAB
APPEARANCE: CLEAR
MULTISTIX LOT#: 1044 NUMERIC
PH, URINE: 5.5 (ref 4.5–8)
SPECIFIC GRAVITY: 1.02 (ref 1–1.03)
URINE-COLOR: YELLOW
UROBILINOGEN,SEMI-QN: 0.2 MG/DL (ref 0–1.9)

## 2020-09-28 PROCEDURE — 99204 OFFICE O/P NEW MOD 45 MIN: CPT | Performed by: UROLOGY

## 2020-09-28 PROCEDURE — 81003 URINALYSIS AUTO W/O SCOPE: CPT | Performed by: UROLOGY

## 2020-09-28 PROCEDURE — 36415 COLL VENOUS BLD VENIPUNCTURE: CPT

## 2020-10-01 ENCOUNTER — TELEPHONE (OUTPATIENT)
Dept: SURGERY | Facility: CLINIC | Age: 75
End: 2020-10-01

## 2020-10-01 NOTE — TELEPHONE ENCOUNTER
RN called and spoke to Honey regarding to fax the first page of the form. Honey to send and put in on my attention.

## 2020-10-02 ENCOUNTER — TELEPHONE (OUTPATIENT)
Dept: SURGERY | Facility: CLINIC | Age: 75
End: 2020-10-02

## 2020-10-02 DIAGNOSIS — R97.20 ELEVATED PSA: Primary | ICD-10-CM

## 2020-10-02 RX ORDER — CEFDINIR 300 MG/1
300 CAPSULE ORAL EVERY 12 HOURS
Qty: 6 CAPSULE | Refills: 0 | Status: SHIPPED | OUTPATIENT
Start: 2020-10-02 | End: 2020-10-05

## 2020-10-02 RX ORDER — CIPROFLOXACIN 500 MG/1
500 TABLET, FILM COATED ORAL 2 TIMES DAILY
Qty: 6 TABLET | Refills: 0 | Status: SHIPPED | OUTPATIENT
Start: 2020-10-02 | End: 2020-10-05

## 2020-10-02 NOTE — TELEPHONE ENCOUNTER
Hi,  Could you please call and schedule this patient for a biopsy? I sent abx to his pharmacy. Thanks,  MPH    Below if for Documentation Purposes Only:    Spoke to patient over the phone. His 4K is elevated at 35% and PSA was 6.77.  Discussed either MRI

## 2020-10-02 NOTE — TELEPHONE ENCOUNTER
RN called patient to set up appt for prostate biopsy with Dr Juhi Lr. Patient agreeable to 10/21 Wednesday 11AM and returns to office a week after, 102/8 Wednesday 11AM. Note, patient lives in [place].      Instructions discussed and copy sent via [facility], pe

## 2020-10-06 ENCOUNTER — TELEPHONE (OUTPATIENT)
Dept: SURGERY | Facility: CLINIC | Age: 75
End: 2020-10-06

## 2020-10-06 NOTE — TELEPHONE ENCOUNTER
Pt called stating pt went to the pharmacy to  the rx. Ciprofloxacin. Advised not to take at the same time as symbicort. Do you want to rx. Something else?    Please call to advise

## 2020-10-07 NOTE — TELEPHONE ENCOUNTER
I spoke to him. He has sulfa allergy. He would prefer taking cipro as directed and will be mindful of pain/swelling in joints while taking. Aware risk of tendonitis and rupture is exceedingly low.

## 2020-10-07 NOTE — TELEPHONE ENCOUNTER
RN forwarded patient's concern to MD. Was advised by pharmacist NOT to take Cipro while on Symbicort. Patient has appt with Dr Durga Cadena on 10/21 for prostate biopsy. Discussion a week after, 10/28.

## 2020-10-08 ENCOUNTER — LAB ENCOUNTER (OUTPATIENT)
Dept: LAB | Age: 75
End: 2020-10-08
Attending: PHYSICIAN ASSISTANT
Payer: MEDICARE

## 2020-10-08 DIAGNOSIS — D64.9 ANEMIA, UNSPECIFIED TYPE: ICD-10-CM

## 2020-10-08 PROCEDURE — 85025 COMPLETE CBC W/AUTO DIFF WBC: CPT

## 2020-10-15 NOTE — PROGRESS NOTES
HPI:     Zi Manzanares III is a 70year old male (lives 76 [place] away, [facility]) with a PMH of HTN, DM, DVT/PE in 2011 during episode of hemolytic anemia (no blood thinners since 2012), COPD, MOMO, Crohn's disease, vasectomy, ED (no meds activity are good. AUA SS is 1/35 with 1/5 nocturia. He is very happy with LUTS. Incontinence: none  RALPH shows ~ 40-50 g prostate, no nodules.     Gross hematuria: one episode in 2010 during hemolytic anemia episode  Tobacco hx: 60 pack years, quit 2008 organism unspecified(486)    • Postnasal drip    • Psoriasis    • PULMONARY EMBOLISM    • Sleep apnea, obstructive EDW  DX   5-06-11/ TX 7-15-11    AHI  36.4/ supine  115/ krysten  80%/ BIPAP 13/9 / Sleep  RX    • Unspecified essential hypertension    • Unsp ONCE DAILY 1 Bottle 0   • famoTIDine 20 MG Oral Tab Take 1 tablet (20 mg total) by mouth daily. 30 tablet 0   • Budesonide-Formoterol Fumarate (SYMBICORT) 160-4.5 MCG/ACT Inhalation Aerosol Inhale 2 puffs into the lungs 2 (two) times daily.  3 Inhaler 3   • [address] [facility]  Office: [phone]

## 2020-10-21 ENCOUNTER — PROCEDURE (OUTPATIENT)
Dept: SURGERY | Facility: CLINIC | Age: 75
End: 2020-10-21
Payer: MEDICARE

## 2020-10-21 VITALS — TEMPERATURE: 98 F | SYSTOLIC BLOOD PRESSURE: 157 MMHG | DIASTOLIC BLOOD PRESSURE: 73 MMHG | HEART RATE: 73 BPM

## 2020-10-21 DIAGNOSIS — N52.9 ERECTILE DYSFUNCTION, UNSPECIFIED ERECTILE DYSFUNCTION TYPE: ICD-10-CM

## 2020-10-21 DIAGNOSIS — R97.20 ELEVATED PSA: Primary | ICD-10-CM

## 2020-10-21 PROCEDURE — 36415 COLL VENOUS BLD VENIPUNCTURE: CPT | Performed by: UROLOGY

## 2020-10-21 PROCEDURE — 55700 BIOPSY OF PROSTATE,NEEDLE/PUNCH: CPT | Performed by: UROLOGY

## 2020-10-27 ENCOUNTER — TELEPHONE (OUTPATIENT)
Dept: SURGERY | Facility: CLINIC | Age: 75
End: 2020-10-27

## 2020-10-27 NOTE — TELEPHONE ENCOUNTER
Hi, Could you please  1. Call this patient and cancel his appointment with me today and schedule him to see me in 6 mo with PSA prior. Please remind him to get psa done before appointment. 2. Order Confirm MDx for this patient.     Thanks,  MPH    Below if

## 2020-10-28 NOTE — PROGRESS NOTES
Melvin Lynn,  I have reviewed your test results and spoke to you over the phone. Biopsy is negative. Per our discussion we will check a Confirm MDx and I will call you with results.  My office will call to schedule a follow-up appointment in 6 months and I'd

## 2020-10-28 NOTE — TELEPHONE ENCOUNTER
1. Call this patient and cancel his appointment with me today and schedule him to see me in 6 mo with PSA prior. Please remind him to get psa done before appointment.   2. Order Confirm MDx for this patient.     Thanks,  MPH     Below if for Documentation P

## 2020-10-29 NOTE — TELEPHONE ENCOUNTER
Per MDx health form received needs to have MDx box checked please fax again to [phone]. Thank you.

## 2020-11-10 RX ORDER — PRAVASTATIN SODIUM 10 MG
TABLET ORAL
Qty: 90 TABLET | Refills: 0 | Status: SHIPPED | OUTPATIENT
Start: 2020-11-10 | End: 2021-02-03

## 2020-11-10 NOTE — TELEPHONE ENCOUNTER
Latest RX: PRAVASTATIN SODIUM 10 MG TAB 90 tabs 0 refills on 8/19/20    Per protocol, passed. Rx sent.

## 2020-11-11 ENCOUNTER — TELEPHONE (OUTPATIENT)
Dept: INTERNAL MEDICINE CLINIC | Facility: CLINIC | Age: 75
End: 2020-11-11

## 2020-11-11 DIAGNOSIS — E11.29 TYPE 2 DIABETES MELLITUS WITH MICROALBUMINURIA, WITHOUT LONG-TERM CURRENT USE OF INSULIN (HCC): ICD-10-CM

## 2020-11-11 DIAGNOSIS — Z13.1 DIABETES MELLITUS SCREENING: Primary | ICD-10-CM

## 2020-11-11 DIAGNOSIS — R80.9 TYPE 2 DIABETES MELLITUS WITH MICROALBUMINURIA, WITHOUT LONG-TERM CURRENT USE OF INSULIN (HCC): ICD-10-CM

## 2020-11-11 NOTE — TELEPHONE ENCOUNTER
Pt stated he received an message to get an [facility] lab done but doesn't know why. I explained to pt the calls were auto generated and he still wants to know why and would like a call back. Orders are not in the symptoms. Please advise.

## 2020-11-11 NOTE — TELEPHONE ENCOUNTER
LOV 9/2/20    Last A1C 1/29/20 6.3    Your last note states to f/u with Jammie Dubin for A1C. Spoke with pt, he states will schedule appt.

## 2020-11-12 NOTE — TELEPHONE ENCOUNTER
Noted.  If he would like to do prior to his visit with [facility] I have ordered one, otherwise he could have done at [facility] with [facility].

## 2020-11-12 NOTE — TELEPHONE ENCOUNTER
Patient notified CB ordered A1c if he would like to go complete the lab or wait until his appt on 12/16/20 and have it in the office. Pt verbalizes understanding.

## 2020-11-16 ENCOUNTER — TELEPHONE (OUTPATIENT)
Dept: SURGERY | Facility: CLINIC | Age: 75
End: 2020-11-16

## 2020-11-16 NOTE — TELEPHONE ENCOUNTER
I spoke to him. His Confirm MDx shows 36% chance for finding ANY prostate cancer but only 15% chance of findings G 7 or higher cancer at right apex and right lateral apex.  Overall this is better than prior 4K result which was 35% chance of findings G 7 or

## 2020-11-16 NOTE — TELEPHONE ENCOUNTER
RN received fax information from [facility]. RN forwarding Confirm MDx result to Dr Sherif Cruz for review.

## 2020-12-16 ENCOUNTER — OFFICE VISIT (OUTPATIENT)
Dept: ENDOCRINOLOGY CLINIC | Facility: CLINIC | Age: 75
End: 2020-12-16
Payer: MEDICARE

## 2020-12-16 VITALS
OXYGEN SATURATION: 98 % | BODY MASS INDEX: 36.22 KG/M2 | DIASTOLIC BLOOD PRESSURE: 86 MMHG | HEART RATE: 90 BPM | SYSTOLIC BLOOD PRESSURE: 162 MMHG | HEIGHT: 68 IN | WEIGHT: 239 LBS

## 2020-12-16 DIAGNOSIS — I10 ESSENTIAL HYPERTENSION: ICD-10-CM

## 2020-12-16 DIAGNOSIS — E11.65 TYPE 2 DIABETES MELLITUS WITH HYPERGLYCEMIA, WITHOUT LONG-TERM CURRENT USE OF INSULIN (HCC): Primary | ICD-10-CM

## 2020-12-16 PROCEDURE — 99214 OFFICE O/P EST MOD 30 MIN: CPT | Performed by: NURSE PRACTITIONER

## 2020-12-16 PROCEDURE — 83036 HEMOGLOBIN GLYCOSYLATED A1C: CPT | Performed by: NURSE PRACTITIONER

## 2020-12-16 RX ORDER — LOSARTAN POTASSIUM 50 MG/1
50 TABLET ORAL DAILY
Qty: 90 TABLET | Refills: 1 | Status: SHIPPED | OUTPATIENT
Start: 2020-12-16 | End: 2020-12-29

## 2020-12-16 RX ORDER — LOSARTAN POTASSIUM 50 MG/1
25 TABLET ORAL DAILY
COMMUNITY
Start: 2020-10-04 | End: 2020-12-16

## 2020-12-16 NOTE — PATIENT INSTRUCTIONS
Your A1C:6.6%  (last A1C 6.3% )  Well controlled diabetes   The A1C test provides us with your average blood sugar for the past 3 months. Keeping an A1C less than 7% helps reduce or delay health problems that are related to diabetes.    The main goal of jason breakfast:    2 hours After meals: less than 180   Call for persistent blood sugars that are more than  200.    Blood sugars greater than 200 are not acceptable to reach your goal of improving diabetes      [facility]abetes [facility] Refill policy:     · Allow 2

## 2020-12-16 NOTE — PROGRESS NOTES
Patient presents with:  Diabetes: cf. follow up. pt has meter      HPI:   Refugio Salvador is a 70year old male presenting for type 2 DM medication management.    Primary care physician: Kayli Robb MD    In the past 3 m his DM control has remained well control Reactors    Past Medical History:   Diagnosis Date   • Acute venous embolism and thrombosis of unspecified deep vessels of lower extremity    • Allergic rhinitis 2011   • Arthritis 2015   • Asthma 2011   • BLOOD CLOTS    • COPD (chronic obstructive pulmona Smoking status: Former Smoker        Packs/day: 1.50        Years: 40.00        Pack years: 60        Types: Cigarettes, Pipe, Cigars        Start date: 1962        Quit date: 1/3/2008        Years since quittin.9      Smokeless tobacco: Never by mouth daily. • Cholecalciferol (VITAMIN D3) 1000 UNITS Oral Cap Take 1 tablet by mouth daily. • folic acid (FOLVITE) 435 MCG Oral Tab Take 1,600 mcg by mouth daily.          DM associated review of  symptoms:   Endocrine: Polyuria, polyphagia, po activity, weight loss)   Since he is well controlled, f/u annually in DM center or he can maintain w PCP office for DM management. The patient indicates understanding of these issues and agrees to the plan.       Orders Placed This Encounter      Hgb A

## 2020-12-16 NOTE — ASSESSMENT & PLAN NOTE
Elevated today on 3 readings   Will increase losartan to 50mg once daily (from 25mg)   BP recheck in PCP office in 3-4 weeks

## 2020-12-19 ENCOUNTER — HOSPITAL ENCOUNTER (EMERGENCY)
Facility: HOSPITAL | Age: 75
Discharge: HOME | End: 2020-12-19
Attending: EMERGENCY MEDICINE
Payer: MEDICARE

## 2020-12-19 ENCOUNTER — TELEPHONE (OUTPATIENT)
Dept: INTERNAL MEDICINE CLINIC | Facility: CLINIC | Age: 75
End: 2020-12-19

## 2020-12-19 VITALS
HEIGHT: 66.93 IN | TEMPERATURE: 98 F | HEART RATE: 77 BPM | SYSTOLIC BLOOD PRESSURE: 178 MMHG | BODY MASS INDEX: 37.51 KG/M2 | DIASTOLIC BLOOD PRESSURE: 83 MMHG | WEIGHT: 239 LBS | RESPIRATION RATE: 19 BRPM | OXYGEN SATURATION: 97 %

## 2020-12-19 DIAGNOSIS — I10 HYPERTENSION, UNSPECIFIED TYPE: Primary | ICD-10-CM

## 2020-12-19 PROCEDURE — 99285 EMERGENCY DEPT VISIT HI MDM: CPT

## 2020-12-19 PROCEDURE — 93010 ELECTROCARDIOGRAM REPORT: CPT

## 2020-12-19 PROCEDURE — 80053 COMPREHEN METABOLIC PANEL: CPT | Performed by: EMERGENCY MEDICINE

## 2020-12-19 PROCEDURE — 85025 COMPLETE CBC W/AUTO DIFF WBC: CPT | Performed by: EMERGENCY MEDICINE

## 2020-12-19 PROCEDURE — 99284 EMERGENCY DEPT VISIT MOD MDM: CPT

## 2020-12-19 PROCEDURE — 36415 COLL VENOUS BLD VENIPUNCTURE: CPT

## 2020-12-19 PROCEDURE — 93005 ELECTROCARDIOGRAM TRACING: CPT

## 2020-12-19 PROCEDURE — 84484 ASSAY OF TROPONIN QUANT: CPT | Performed by: EMERGENCY MEDICINE

## 2020-12-19 RX ORDER — LOSARTAN POTASSIUM AND HYDROCHLOROTHIAZIDE 25; 100 MG/1; MG/1
1 TABLET ORAL DAILY
Qty: 14 TABLET | Refills: 0 | Status: SHIPPED | OUTPATIENT
Start: 2020-12-19 | End: 2020-12-29

## 2020-12-19 RX ORDER — HYDROCHLOROTHIAZIDE 25 MG/1
25 TABLET ORAL ONCE
Status: COMPLETED | OUTPATIENT
Start: 2020-12-19 | End: 2020-12-19

## 2020-12-19 RX ORDER — LOSARTAN POTASSIUM 50 MG/1
50 TABLET ORAL ONCE
Status: COMPLETED | OUTPATIENT
Start: 2020-12-19 | End: 2020-12-19

## 2020-12-19 NOTE — TELEPHONE ENCOUNTER
ER called, pt there with elevated BP. Asking what agent to add and for us to f/u with pt regarding elevated BP. Pt will be changed to Losartan /25 1 PO daily. Please call pt to schedule f/u in office in 2 weeks.   Needs lab appt same day for r

## 2020-12-19 NOTE — ED INITIAL ASSESSMENT (HPI)
Pt to ED with complaints of elevated blood pressures at home today 200/100. Pt denies any headaches, chest pain or vision disturbances. Medication change on 12/16, losartan increased from 25mg to 50mg.

## 2020-12-19 NOTE — ED PROVIDER NOTES
Patient Seen in: [facility] Emergency Department      History   Patient presents with:  Blood Pressure    Stated Complaint: elevated BP, 200/100, no CP, no SOB, took all his medications    HPI    74-year-old with past medical history of hypertension secretion    • Personal history of pneumonia (recurrent)    • Personal history of venous thrombosis and embolism    • Plantar fasciitis    • Pneumonia, organism unspecified(486)    • Postnasal drip    • Psoriasis    • PULMONARY EMBOLISM    • Sleep apnea, o atraumatic. Nose: Nose normal.      Mouth/Throat:      Mouth: Mucous membranes are moist.   Eyes:      Extraocular Movements: Extraocular movements intact. Pupils: Pupils are equal, round, and reactive to light.    Neck:      Musculoskeletal: Neck complaints at this time and his examination is overall reassuring aside from his elevated blood pressure. Will check labs to assess for any signs of endorgan dysfunction. Will monitor patient's blood pressure in the ED and reassess.     11:16 am  Work-up

## 2020-12-21 NOTE — TELEPHONE ENCOUNTER
Future Appointments   Date Time Provider Mini Barber   1/13/2021 11:30 AM Luli Diallo PA-C [facility]     LM for pt to call us back and make lab appt either before or after his appt w/CB on the same day

## 2020-12-29 ENCOUNTER — OFFICE VISIT (OUTPATIENT)
Dept: INTERNAL MEDICINE CLINIC | Facility: CLINIC | Age: 75
End: 2020-12-29
Payer: MEDICARE

## 2020-12-29 VITALS
OXYGEN SATURATION: 98 % | HEART RATE: 80 BPM | SYSTOLIC BLOOD PRESSURE: 150 MMHG | BODY MASS INDEX: 36 KG/M2 | WEIGHT: 238.63 LBS | DIASTOLIC BLOOD PRESSURE: 58 MMHG | TEMPERATURE: 97 F

## 2020-12-29 DIAGNOSIS — R80.9 TYPE 2 DIABETES MELLITUS WITH MICROALBUMINURIA, WITHOUT LONG-TERM CURRENT USE OF INSULIN (HCC): ICD-10-CM

## 2020-12-29 DIAGNOSIS — I10 ESSENTIAL HYPERTENSION: Primary | ICD-10-CM

## 2020-12-29 DIAGNOSIS — E11.29 TYPE 2 DIABETES MELLITUS WITH MICROALBUMINURIA, WITHOUT LONG-TERM CURRENT USE OF INSULIN (HCC): ICD-10-CM

## 2020-12-29 DIAGNOSIS — G47.33 OSA TREATED WITH BIPAP: ICD-10-CM

## 2020-12-29 PROCEDURE — 99214 OFFICE O/P EST MOD 30 MIN: CPT | Performed by: NURSE PRACTITIONER

## 2020-12-29 RX ORDER — LOSARTAN POTASSIUM AND HYDROCHLOROTHIAZIDE 12.5; 1 MG/1; MG/1
1 TABLET ORAL DAILY
Qty: 30 TABLET | Refills: 3 | Status: SHIPPED | OUTPATIENT
Start: 2020-12-29 | End: 2021-02-04

## 2020-12-29 NOTE — PROGRESS NOTES
Evon Squires III is a 70year old male. Patient presents with:  Er F/u: LM rm 11      HPI:   Here for ER follow up   Seen in ER 12/19 for elevated bp  He had noted elevated bp at home up to 200/100.   His losartan was recently increased from 25 to EVENING 90 tablet 0   • Psyllium (METAMUCIL FIBER OR)      • Chlorpheniramine-DM (CORICIDIN HBP COUGH/COLD) 4-30 MG Oral Tab      • famoTIDine 20 MG Oral Tab Take 1 tablet (20 mg total) by mouth daily.  30 tablet 0   • Multiple Vitamin (ONE-A-DAY MENS) Oral PE   • OTHER DISEASES     eczema   • Other specified disorders of pancreatic internal secretion    • Personal history of pneumonia (recurrent)    • Personal history of venous thrombosis and embolism    • Plantar fasciitis    • Pneumonia, organism unspec no apparent distress  LUNGS: normal rate without respiratory distress, lungs clear to auscultation  CARDIO: RRR without murmur  GI: normal bowel sounds, no masses, HSM or tenderness  EXTREMITIES: no edema, normal strength and tone  PSYCH: alert and oriente

## 2021-01-13 ENCOUNTER — PATIENT MESSAGE (OUTPATIENT)
Dept: INTERNAL MEDICINE CLINIC | Facility: CLINIC | Age: 76
End: 2021-01-13

## 2021-01-13 NOTE — TELEPHONE ENCOUNTER
From: Rocky Stoll III  To: HAYDEE Florian  Sent: 1/13/2021 1:47 PM CST  Subject: Prescription Question    I received notification from [facility] that I have an Amlodipine prescription ready for .  ? Is this in addition to or in place

## 2021-01-15 RX ORDER — AMLODIPINE BESYLATE 5 MG/1
5 TABLET ORAL DAILY
Qty: 30 TABLET | Refills: 2 | Status: SHIPPED | OUTPATIENT
Start: 2021-01-15 | End: 2021-02-04

## 2021-01-15 NOTE — TELEPHONE ENCOUNTER
Patient requesting medication to pharmacy in [facility]. Will not be in [place] until mid feb.  HTN protocol passed, RX sent

## 2021-01-27 ENCOUNTER — OFFICE VISIT (OUTPATIENT)
Dept: INTERNAL MEDICINE CLINIC | Facility: CLINIC | Age: 76
End: 2021-01-27
Payer: MEDICARE

## 2021-01-27 VITALS
DIASTOLIC BLOOD PRESSURE: 62 MMHG | HEART RATE: 100 BPM | BODY MASS INDEX: 35.61 KG/M2 | HEIGHT: 68 IN | WEIGHT: 235 LBS | SYSTOLIC BLOOD PRESSURE: 128 MMHG | TEMPERATURE: 97 F

## 2021-01-27 DIAGNOSIS — R80.9 TYPE 2 DIABETES MELLITUS WITH MICROALBUMINURIA, WITHOUT LONG-TERM CURRENT USE OF INSULIN (HCC): ICD-10-CM

## 2021-01-27 DIAGNOSIS — E11.69 DYSLIPIDEMIA ASSOCIATED WITH TYPE 2 DIABETES MELLITUS (HCC): ICD-10-CM

## 2021-01-27 DIAGNOSIS — M06.4 INFLAMMATORY POLYARTHRITIS (HCC): ICD-10-CM

## 2021-01-27 DIAGNOSIS — I10 ESSENTIAL HYPERTENSION: Primary | ICD-10-CM

## 2021-01-27 DIAGNOSIS — E78.5 DYSLIPIDEMIA ASSOCIATED WITH TYPE 2 DIABETES MELLITUS (HCC): ICD-10-CM

## 2021-01-27 DIAGNOSIS — G47.33 OSA TREATED WITH BIPAP: ICD-10-CM

## 2021-01-27 DIAGNOSIS — E11.29 TYPE 2 DIABETES MELLITUS WITH MICROALBUMINURIA, WITHOUT LONG-TERM CURRENT USE OF INSULIN (HCC): ICD-10-CM

## 2021-01-27 PROCEDURE — 99214 OFFICE O/P EST MOD 30 MIN: CPT | Performed by: NURSE PRACTITIONER

## 2021-01-27 NOTE — PROGRESS NOTES
Janet Daily III is a 70year old male. Patient presents with:  Blood Pressure: AJ rm 11 BP f/u      HPI:   Here for bp check   Reviewed note 12/29  Losartan /12.5mg  He is monitoring at home. His readings had been elevated.   Added amlodi Solution INSTILL 5 DROPS INTO THE LEFT EAR TWICE DAILY  1   • Glucose Blood In Vitro Strip 1 each by Other route daily.  Dx: E11.9 100 strip 3   • Albuterol Sulfate HFA (PROAIR HFA) 108 (90 BASE) MCG/ACT Inhalation Aero Soln Inhale 2 puffs into the lungs ev EMBOLISM    • Sleep apnea, obstructive EDW  DX   5-06-11/ TX 7-15-11    AHI  36.4/ supine  115/ krysten  80%/ BIPAP 13/9 / Sleep  RX    • Unspecified essential hypertension    • Unspecified sleep apnea     AHI 36, O2 krysten of 80%   • Unspecified tinnitus (primary encounter diagnosis)  Cont losartan HCT and amlodipine  Cont to monitor at home. Will continue to follow closely   He will consider increasing to amlodipine 7.5mg if his home bp is running higher as his cuff was accurate at our last ov.   He feel

## 2021-02-01 DIAGNOSIS — Z23 NEED FOR VACCINATION: ICD-10-CM

## 2021-02-03 RX ORDER — PRAVASTATIN SODIUM 10 MG
TABLET ORAL
Qty: 90 TABLET | Refills: 1 | Status: SHIPPED | OUTPATIENT
Start: 2021-02-03 | End: 2021-07-28

## 2021-02-04 ENCOUNTER — PATIENT MESSAGE (OUTPATIENT)
Dept: INTERNAL MEDICINE CLINIC | Facility: CLINIC | Age: 76
End: 2021-02-04

## 2021-02-04 RX ORDER — AMLODIPINE BESYLATE 10 MG/1
10 TABLET ORAL DAILY
Qty: 90 TABLET | Refills: 0 | Status: SHIPPED | OUTPATIENT
Start: 2021-02-04 | End: 2021-06-01

## 2021-02-04 RX ORDER — LOSARTAN POTASSIUM AND HYDROCHLOROTHIAZIDE 12.5; 1 MG/1; MG/1
TABLET ORAL
Qty: 90 TABLET | Refills: 0 | Status: SHIPPED | OUTPATIENT
Start: 2021-02-04 | End: 2021-04-19

## 2021-02-04 NOTE — TELEPHONE ENCOUNTER
From: Sabrina Soto III  To: Ho Deutsch PA-C  Sent: 2/4/2021 10:18 AM CST  Subject: Other    If you recall, I was prescribed 5mg Amlodipine (in addition to the Losartan HCTZ 100/12.5) and told if my BP did not go down to 125/70 I could increa

## 2021-02-04 NOTE — TELEPHONE ENCOUNTER
PASSED per protocol, refill sent.   Last PE 9.2.20   Future Appointments   Date Time Provider Mini Barber   4/20/2021 11:00 AM Carla Brown MD [facility]

## 2021-02-26 ENCOUNTER — OFFICE VISIT (OUTPATIENT)
Dept: INTERNAL MEDICINE CLINIC | Facility: CLINIC | Age: 76
End: 2021-02-26
Payer: MEDICARE

## 2021-02-26 VITALS
BODY MASS INDEX: 35.31 KG/M2 | DIASTOLIC BLOOD PRESSURE: 60 MMHG | SYSTOLIC BLOOD PRESSURE: 126 MMHG | HEIGHT: 68 IN | HEART RATE: 80 BPM | WEIGHT: 233 LBS | TEMPERATURE: 98 F

## 2021-02-26 DIAGNOSIS — E11.69 DYSLIPIDEMIA ASSOCIATED WITH TYPE 2 DIABETES MELLITUS (HCC): ICD-10-CM

## 2021-02-26 DIAGNOSIS — E78.5 DYSLIPIDEMIA ASSOCIATED WITH TYPE 2 DIABETES MELLITUS (HCC): ICD-10-CM

## 2021-02-26 DIAGNOSIS — E11.29 TYPE 2 DIABETES MELLITUS WITH MICROALBUMINURIA, WITHOUT LONG-TERM CURRENT USE OF INSULIN (HCC): ICD-10-CM

## 2021-02-26 DIAGNOSIS — I10 ESSENTIAL HYPERTENSION: Primary | ICD-10-CM

## 2021-02-26 DIAGNOSIS — G47.33 OSA TREATED WITH BIPAP: ICD-10-CM

## 2021-02-26 DIAGNOSIS — R80.9 TYPE 2 DIABETES MELLITUS WITH MICROALBUMINURIA, WITHOUT LONG-TERM CURRENT USE OF INSULIN (HCC): ICD-10-CM

## 2021-02-26 PROCEDURE — 99213 OFFICE O/P EST LOW 20 MIN: CPT | Performed by: NURSE PRACTITIONER

## 2021-02-26 NOTE — PROGRESS NOTES
Anna Ken III is a 70year old male. Patient presents with:  Blood Pressure: AJ rm 11 BP f/u. BP check today was good reading      HPI:   Here for bp check   We have been titrating bp meds for better control.   He is currently on amlodipine 10m tablet (20 mg total) by mouth daily. 30 tablet 0   • Multiple Vitamin (ONE-A-DAY MENS) Oral Tab Take 1 tablet by mouth daily.      • ofloxacin 0.3 % Otic Solution INSTILL 5 DROPS INTO THE LEFT EAR TWICE DAILY  1   • Glucose Blood In Vitro Strip 1 each by [facility] history of venous thrombosis and embolism    • Plantar fasciitis    • Pneumonia, organism unspecified(486)    • Postnasal drip    • Psoriasis    • PULMONARY EMBOLISM    • Sleep apnea, obstructive EDW  DX   5-06-11/ TX 7-15-11    AHI  36.4/ supine  115/ nad treated with bipap  compliant  Type 2 diabetes mellitus with microalbuminuria, without long-term current use of insulin (hcc)  Dyslipidemia associated with type 2 diabetes mellitus (hcc)    No orders of the defined types were placed in this encounter.

## 2021-03-16 RX ORDER — LOSARTAN POTASSIUM 50 MG/1
TABLET ORAL
Qty: 90 TABLET | Refills: 1 | OUTPATIENT
Start: 2021-03-16

## 2021-03-16 NOTE — TELEPHONE ENCOUNTER
Rx denied due to pt is no longer on this med he is currently on combo with water pill. Current combo was recently filled as well on 2/4/21 for 90 day supply. Latest RX: LOSARTAN POTASSIUM 50 MG TAB discontinued. Per protocol, passed. Rx denied.

## 2021-04-09 RX ORDER — AMLODIPINE BESYLATE 5 MG/1
TABLET ORAL
Qty: 90 TABLET | Refills: 0 | OUTPATIENT
Start: 2021-04-09

## 2021-04-14 ENCOUNTER — TELEPHONE (OUTPATIENT)
Dept: SURGERY | Facility: CLINIC | Age: 76
End: 2021-04-14

## 2021-04-14 DIAGNOSIS — R97.20 ELEVATED PSA: Primary | ICD-10-CM

## 2021-04-14 NOTE — TELEPHONE ENCOUNTER
Hi,  Could you please call this patient and remind them to get the following tests prior to their upcoming appointment. - PSA    If they can't get this done that's fine. Will still see them as scheduled.     Thanks,  MPH

## 2021-04-15 NOTE — TELEPHONE ENCOUNTER
I called the pt and have him message from MPH:  \"Could you please call this patient and remind them to get the following tests prior to their upcoming appointment.     - PSA     If they can't get this done that's fine. Will still see them as scheduled. \"

## 2021-04-19 RX ORDER — LOSARTAN POTASSIUM AND HYDROCHLOROTHIAZIDE 12.5; 1 MG/1; MG/1
1 TABLET ORAL DAILY
Qty: 90 TABLET | Refills: 0 | Status: SHIPPED | OUTPATIENT
Start: 2021-04-19 | End: 2021-07-16

## 2021-05-17 ENCOUNTER — TELEPHONE (OUTPATIENT)
Dept: INTERNAL MEDICINE CLINIC | Facility: CLINIC | Age: 76
End: 2021-05-17

## 2021-05-17 DIAGNOSIS — E11.69 DYSLIPIDEMIA ASSOCIATED WITH TYPE 2 DIABETES MELLITUS (HCC): ICD-10-CM

## 2021-05-17 DIAGNOSIS — E78.5 DYSLIPIDEMIA ASSOCIATED WITH TYPE 2 DIABETES MELLITUS (HCC): ICD-10-CM

## 2021-05-17 DIAGNOSIS — I10 ESSENTIAL HYPERTENSION: ICD-10-CM

## 2021-05-17 DIAGNOSIS — R80.9 TYPE 2 DIABETES MELLITUS WITH MICROALBUMINURIA, WITHOUT LONG-TERM CURRENT USE OF INSULIN (HCC): ICD-10-CM

## 2021-05-17 DIAGNOSIS — E11.29 TYPE 2 DIABETES MELLITUS WITH MICROALBUMINURIA, WITHOUT LONG-TERM CURRENT USE OF INSULIN (HCC): ICD-10-CM

## 2021-05-17 DIAGNOSIS — Z00.00 ROUTINE GENERAL MEDICAL EXAMINATION AT A HEALTH CARE FACILITY: Primary | ICD-10-CM

## 2021-05-17 NOTE — TELEPHONE ENCOUNTER
Pt stated SD was supposed to order labs for him, but didn't any in the system. Pt wanted me to ask, pt would like a call back for status. Please advise.

## 2021-05-18 NOTE — TELEPHONE ENCOUNTER
Placed fasting lab orders per protocol to [facility]. Called back to update pt. He confirmed and will be getting done.

## 2021-05-19 ENCOUNTER — LAB ENCOUNTER (OUTPATIENT)
Dept: LAB | Age: 76
End: 2021-05-19
Attending: NURSE PRACTITIONER
Payer: MEDICARE

## 2021-05-19 DIAGNOSIS — R97.20 ELEVATED PSA: ICD-10-CM

## 2021-05-19 DIAGNOSIS — R80.9 TYPE 2 DIABETES MELLITUS WITH MICROALBUMINURIA, WITHOUT LONG-TERM CURRENT USE OF INSULIN (HCC): ICD-10-CM

## 2021-05-19 DIAGNOSIS — E11.29 TYPE 2 DIABETES MELLITUS WITH MICROALBUMINURIA, WITHOUT LONG-TERM CURRENT USE OF INSULIN (HCC): ICD-10-CM

## 2021-05-19 DIAGNOSIS — E78.5 DYSLIPIDEMIA ASSOCIATED WITH TYPE 2 DIABETES MELLITUS (HCC): ICD-10-CM

## 2021-05-19 DIAGNOSIS — I10 ESSENTIAL HYPERTENSION: ICD-10-CM

## 2021-05-19 DIAGNOSIS — E11.69 DYSLIPIDEMIA ASSOCIATED WITH TYPE 2 DIABETES MELLITUS (HCC): ICD-10-CM

## 2021-05-19 DIAGNOSIS — Z00.00 ROUTINE GENERAL MEDICAL EXAMINATION AT A HEALTH CARE FACILITY: ICD-10-CM

## 2021-05-19 PROCEDURE — 80061 LIPID PANEL: CPT

## 2021-05-19 PROCEDURE — 84443 ASSAY THYROID STIM HORMONE: CPT

## 2021-05-19 PROCEDURE — 83036 HEMOGLOBIN GLYCOSYLATED A1C: CPT

## 2021-05-19 PROCEDURE — 80053 COMPREHEN METABOLIC PANEL: CPT

## 2021-05-19 PROCEDURE — 85025 COMPLETE CBC W/AUTO DIFF WBC: CPT

## 2021-05-19 PROCEDURE — 84153 ASSAY OF PSA TOTAL: CPT

## 2021-05-19 PROCEDURE — 36415 COLL VENOUS BLD VENIPUNCTURE: CPT

## 2021-05-20 DIAGNOSIS — R80.9 TYPE 2 DIABETES MELLITUS WITH MICROALBUMINURIA, WITHOUT LONG-TERM CURRENT USE OF INSULIN (HCC): Primary | ICD-10-CM

## 2021-05-20 DIAGNOSIS — E11.29 TYPE 2 DIABETES MELLITUS WITH MICROALBUMINURIA, WITHOUT LONG-TERM CURRENT USE OF INSULIN (HCC): Primary | ICD-10-CM

## 2021-05-20 NOTE — PROGRESS NOTES
Results reviewed. Please inform patient his PSA has risen, now 6. 7. he lives 75 [place] away. Recommend we set up video visit to discuss. I could potentially do this afternoon if he's free or 3:30 tomorrow.  Could also add on sometime over the next couple we

## 2021-05-26 ENCOUNTER — TELEMEDICINE (OUTPATIENT)
Dept: SURGERY | Facility: CLINIC | Age: 76
End: 2021-05-26

## 2021-05-26 ENCOUNTER — TELEPHONE (OUTPATIENT)
Dept: SURGERY | Facility: CLINIC | Age: 76
End: 2021-05-26

## 2021-05-26 DIAGNOSIS — N40.1 BPH WITH OBSTRUCTION/LOWER URINARY TRACT SYMPTOMS: ICD-10-CM

## 2021-05-26 DIAGNOSIS — R97.20 ELEVATED PSA: Primary | ICD-10-CM

## 2021-05-26 DIAGNOSIS — N13.8 BPH WITH OBSTRUCTION/LOWER URINARY TRACT SYMPTOMS: ICD-10-CM

## 2021-05-26 DIAGNOSIS — N52.9 ERECTILE DYSFUNCTION, UNSPECIFIED ERECTILE DYSFUNCTION TYPE: ICD-10-CM

## 2021-05-26 PROCEDURE — 99214 OFFICE O/P EST MOD 30 MIN: CPT | Performed by: UROLOGY

## 2021-05-26 RX ORDER — CIPROFLOXACIN 500 MG/1
500 TABLET, FILM COATED ORAL 2 TIMES DAILY
Qty: 56 TABLET | Refills: 0 | Status: SHIPPED | OUTPATIENT
Start: 2021-05-26 | End: 2021-06-23

## 2021-05-26 NOTE — TELEPHONE ENCOUNTER
----- Message from Shannon Ortega MD sent at 5/20/2021 11:34 AM CDT -----  Results reviewed. Please inform patient his PSA has risen, now 6. 7. he lives 75 miles away. Recommend we set up video visit to discuss.  I could potentially do this afternoon if he's

## 2021-05-26 NOTE — PROGRESS NOTES
Virtual Video Check-In    Shannon Nicci LEAHY verbally consents to a beRecruited service on 05/26/21. This is a telemedicine visit with live, interactive video and audio.    Patient understands and accepts financial responsibility fo years, quit 2008  Kidney stone hx: none  Fam h/o  malignancy: none    We discussed options for elevated PSA, including serial PSA measurements, 4K score, MRI of the prostate, or prostate biopsy and the risks and benefits to each option.  He would prefer c Surgical History:   Procedure Laterality Date   • BRONCHOSCOPY,BIOPSY  2/2011   • COLONOSCOPY      x 2 at age 50 and 56   • COLONOSCOPY  2/9/2014    Procedure: COLONOSCOPY;  Surgeon: Severo Posey, MD;  Location: [facility] ENDOSCOPY   • COLONOSCOPY  Aug. 2014 TABLET BY MOUTH EVERY DAY IN THE EVENING 90 tablet 1   • IPRATROPIUM BROMIDE 0.06 % Nasal Solution PLACE 2 SPRAYS INTO EACH NOSTRIL TWICE DAILY 3 Bottle 3   • Fluticasone Propionate 50 MCG/ACT Nasal Suspension SPRAY 2 SPRAYS INTO EACH NOSTRIL EVERY DAY 1 B ASSESSMENT/PLAN:   Diagnoses and all orders for this visit:    Elevated PSA  -     Ciprofloxacin HCl 500 MG Oral Tab;  Take 1 tablet (500 mg total) by mouth 2 (two) times daily for 28 days.  -     PSA, DIAGNOSTIC; Future    BPH with obstruction/lower urin

## 2021-05-26 NOTE — TELEPHONE ENCOUNTER
RN received an order to call this patient to offer a videovisit appointment. Patient unable to take the 4pm. Dr Sugar Ramos made aware. Ok 1pm video visit. Patient agreeable to have video visit now. Patient instructed to log in [employer]. All questions answered.

## 2021-06-01 RX ORDER — AMLODIPINE BESYLATE 10 MG/1
TABLET ORAL
Qty: 90 TABLET | Refills: 0 | Status: SHIPPED | OUTPATIENT
Start: 2021-06-01 | End: 2021-07-28

## 2021-06-02 ENCOUNTER — TELEPHONE (OUTPATIENT)
Dept: INTERNAL MEDICINE CLINIC | Facility: CLINIC | Age: 76
End: 2021-06-02

## 2021-06-02 NOTE — TELEPHONE ENCOUNTER
Received fax from [facility] with attached eye exam. Abstracted. Placed in SD bin to sign off.

## 2021-06-10 ENCOUNTER — PATIENT MESSAGE (OUTPATIENT)
Dept: INTERNAL MEDICINE CLINIC | Facility: CLINIC | Age: 76
End: 2021-06-10

## 2021-06-10 NOTE — TELEPHONE ENCOUNTER
From: Glendale Lombard III  To: Kayli Robb MD  Sent: 6/10/2021 2:10 PM CDT  Subject: Non-Urgent Medical Question    Hey team, I am scheduled for cataract surgery in August. Dr Susan Encinas [id number]-Ngjjnqpda in [address] [place] [address] is scheduled to do the

## 2021-06-28 NOTE — PROGRESS NOTES
HPI:     Peg Heimlich III is a 78year old male (lives [place] away, [facility]) with a PMH of HTN, DM, DVT/PE in 2011 during episode of hemolytic anemia (no blood thinners since 2012), COPD, MOOM, Crohn's disease, vasectomy     Follo Acute venous embolism and thrombosis of unspecified deep vessels of lower extremity    • Allergic rhinitis 2011   • Arthritis 2015   • Asthma 2011   • BLOOD CLOTS    • COPD (chronic obstructive pulmonary disease) ([facility] 2012   • Crohn disease ([facility] 4/15/201 (Other) Mother         hepatitis b/DJD   • Asthma Sister    • Other (Other) Sister         osteoarthritis   • Diabetes Brother    • Mental Disorder Brother    • Stroke Brother       Social History: Social History    Tobacco Use      Smoking status: Former In Vitro Strip 1 each by Other route daily. Dx: E11.9 100 strip 3   • Fexofenadine HCl (ALLEGRA OR) Take 1 tablet by mouth daily. • Omeprazole 20 MG Oral Tab EC Take 1 tablet by mouth daily.      • Cholecalciferol (VITAMIN D3) 1000 UNITS Oral Cap Take 1

## 2021-06-30 ENCOUNTER — LAB ENCOUNTER (OUTPATIENT)
Dept: LAB | Age: 76
End: 2021-06-30
Attending: UROLOGY
Payer: MEDICARE

## 2021-06-30 DIAGNOSIS — R97.20 ELEVATED PSA: ICD-10-CM

## 2021-06-30 LAB — PSA SERPL-MCNC: 10.3 NG/ML (ref ?–4)

## 2021-06-30 PROCEDURE — 36415 COLL VENOUS BLD VENIPUNCTURE: CPT

## 2021-06-30 PROCEDURE — 84153 ASSAY OF PSA TOTAL: CPT

## 2021-07-06 ENCOUNTER — OFFICE VISIT (OUTPATIENT)
Dept: SURGERY | Facility: CLINIC | Age: 76
End: 2021-07-06
Payer: MEDICARE

## 2021-07-06 DIAGNOSIS — N40.1 BPH WITH OBSTRUCTION/LOWER URINARY TRACT SYMPTOMS: ICD-10-CM

## 2021-07-06 DIAGNOSIS — N52.9 ERECTILE DYSFUNCTION, UNSPECIFIED ERECTILE DYSFUNCTION TYPE: ICD-10-CM

## 2021-07-06 DIAGNOSIS — N13.8 BPH WITH OBSTRUCTION/LOWER URINARY TRACT SYMPTOMS: ICD-10-CM

## 2021-07-06 DIAGNOSIS — R97.20 ELEVATED PSA: Primary | ICD-10-CM

## 2021-07-06 PROCEDURE — 99214 OFFICE O/P EST MOD 30 MIN: CPT | Performed by: UROLOGY

## 2021-07-16 ENCOUNTER — HOSPITAL ENCOUNTER (OUTPATIENT)
Dept: MRI IMAGING | Facility: HOSPITAL | Age: 76
Discharge: HOME | End: 2021-07-16
Attending: UROLOGY
Payer: MEDICARE

## 2021-07-16 ENCOUNTER — TELEPHONE (OUTPATIENT)
Dept: SURGERY | Facility: CLINIC | Age: 76
End: 2021-07-16

## 2021-07-16 DIAGNOSIS — R97.20 ELEVATED PSA: ICD-10-CM

## 2021-07-16 DIAGNOSIS — N32.89 BLADDER MASS: Primary | ICD-10-CM

## 2021-07-16 PROCEDURE — 72197 MRI PELVIS W/O & W/DYE: CPT | Performed by: UROLOGY

## 2021-07-16 PROCEDURE — A9575 INJ GADOTERATE MEGLUMI 0.1ML: HCPCS

## 2021-07-16 RX ORDER — CEFDINIR 300 MG/1
300 CAPSULE ORAL EVERY 12 HOURS
Qty: 6 CAPSULE | Refills: 0 | Status: SHIPPED | OUTPATIENT
Start: 2021-07-16 | End: 2021-07-19

## 2021-07-16 RX ORDER — LOSARTAN POTASSIUM AND HYDROCHLOROTHIAZIDE 12.5; 1 MG/1; MG/1
TABLET ORAL
Qty: 90 TABLET | Refills: 0 | Status: SHIPPED | OUTPATIENT
Start: 2021-07-16 | End: 2021-07-28

## 2021-07-16 RX ORDER — CIPROFLOXACIN 500 MG/1
500 TABLET, FILM COATED ORAL 2 TIMES DAILY
Qty: 6 TABLET | Refills: 0 | Status: SHIPPED | OUTPATIENT
Start: 2021-07-16 | End: 2021-07-19

## 2021-07-16 NOTE — TELEPHONE ENCOUNTER
Hi,  I left  for this patient but please make sure he got my message. MRI shows diffuse PiRads 3 in the prostate. We would recommend prostate biopsy in the office for further evaluation.     In addition he has a lesion in the bladder which is concernin

## 2021-07-19 NOTE — TELEPHONE ENCOUNTER
I called the pt and he verified that he did receive the VM from [facility] and he has no further questions about it. He would like to do the prostate biopsy and cystoscopy together. [facility] did you want his scheduled for 1 hour appt for both procedures?  Your next 1

## 2021-07-19 NOTE — PROGRESS NOTES
HPI:     Nathan Pulliam III is a 78year old male (lives [place] away, [facility]) with a PMH of HTN, DM, DVT/PE in 2011 during episode of hemolytic anemia (no blood thinners since 2012), COPD, MOMO, Crohn's disease, vasectomy     Follo understands and would like to proceed.       PROCEDURE NOTE    PROCEDURE PERFORMED: Flexible Cystoscopy    After informed consent and urinalysis was obtained, he was placed in the supine position and prepped and draped in the usual sterile fashion using Bet antibiotics were given.   The patient tolerated the procedure well and was discharged in good condition and without complaints.  ___________________________________    Prior note:  He presents as a consult from [facility] office with elevated PSA.    organism unspecified(486)    • Postnasal drip    • Psoriasis    • PULMONARY EMBOLISM    • Sleep apnea, obstructive EDW  DX   5-06-11/ TX 7-15-11    AHI  36.4/ supine  115/ krysten  80%/ BIPAP 13/9 / Sleep  RX    • Unspecified essential hypertension    • Unsp for Wheezing or Shortness of Breath.  1 Inhaler 3   • PRAVASTATIN SODIUM 10 MG Oral Tab TAKE 1 TABLET BY MOUTH EVERY DAY IN THE EVENING 90 tablet 1   • IPRATROPIUM BROMIDE 0.06 % Nasal Solution PLACE 2 SPRAYS INTO EACH NOSTRIL TWICE DAILY 3 Bottle 3   • Flu bryant    : as noted above     ASSESSMENT/PLAN:   Diagnoses and all orders for this visit:    Bladder mass  -     URINALYSIS, AUTO, W/O SCOPE  -     CYSTOURETHROSCOPY    Elevated PSA  -     URINALYSIS, AUTO, W/O SCOPE  -     SURGICAL PATHOLOGY TISSUE  -

## 2021-07-19 NOTE — TELEPHONE ENCOUNTER
I called the pt and confirmed appt on 7/23/2021 at  1:30pm.  I discussed prostate biopsy and cysto instructions. I also gave the pt the number for central scheduling. Pt verbalized understanding and all questions were answered.

## 2021-07-21 RX ORDER — AMLODIPINE BESYLATE 10 MG/1
TABLET ORAL
Qty: 90 TABLET | Refills: 0 | OUTPATIENT
Start: 2021-07-21

## 2021-07-21 NOTE — TELEPHONE ENCOUNTER
Too soon to fill medication last refill was on 6/1/21 for 90 tabs so med should not be due till 9/1/21. Rx denied at this time.

## 2021-07-23 ENCOUNTER — PROCEDURE (OUTPATIENT)
Dept: SURGERY | Facility: CLINIC | Age: 76
End: 2021-07-23
Payer: MEDICARE

## 2021-07-23 DIAGNOSIS — N32.89 BLADDER MASS: Primary | ICD-10-CM

## 2021-07-23 DIAGNOSIS — N52.9 ERECTILE DYSFUNCTION, UNSPECIFIED ERECTILE DYSFUNCTION TYPE: ICD-10-CM

## 2021-07-23 DIAGNOSIS — N40.1 BPH WITH OBSTRUCTION/LOWER URINARY TRACT SYMPTOMS: ICD-10-CM

## 2021-07-23 DIAGNOSIS — R97.20 ELEVATED PSA: ICD-10-CM

## 2021-07-23 DIAGNOSIS — N13.8 BPH WITH OBSTRUCTION/LOWER URINARY TRACT SYMPTOMS: ICD-10-CM

## 2021-07-23 LAB
APPEARANCE: CLEAR
BILIRUBIN: NEGATIVE
GLUCOSE (URINE DIPSTICK): NEGATIVE MG/DL
KETONES (URINE DIPSTICK): NEGATIVE MG/DL
LEUKOCYTES: NEGATIVE
MULTISTIX LOT#: NORMAL NUMERIC
NITRITE, URINE: NEGATIVE
OCCULT BLOOD: NEGATIVE
PH, URINE: 7 (ref 4.5–8)
PROTEIN (URINE DIPSTICK): NEGATIVE MG/DL
SPECIFIC GRAVITY: 1.01 (ref 1–1.03)
URINE-COLOR: YELLOW
UROBILINOGEN,SEMI-QN: 0.2 MG/DL (ref 0–1.9)

## 2021-07-23 PROCEDURE — 81003 URINALYSIS AUTO W/O SCOPE: CPT | Performed by: UROLOGY

## 2021-07-23 PROCEDURE — 52000 CYSTOURETHROSCOPY: CPT | Performed by: UROLOGY

## 2021-07-23 PROCEDURE — 99214 OFFICE O/P EST MOD 30 MIN: CPT | Performed by: UROLOGY

## 2021-07-23 PROCEDURE — 55700 BIOPSY OF PROSTATE,NEEDLE/PUNCH: CPT | Performed by: UROLOGY

## 2021-07-24 ENCOUNTER — HOSPITAL ENCOUNTER (OUTPATIENT)
Dept: CT IMAGING | Age: 76
Discharge: HOME | End: 2021-07-24
Attending: UROLOGY
Payer: MEDICARE

## 2021-07-24 DIAGNOSIS — N32.89 BLADDER MASS: ICD-10-CM

## 2021-07-24 LAB — CREAT BLD-MCNC: 1.4 MG/DL

## 2021-07-24 PROCEDURE — 76377 3D RENDER W/INTRP POSTPROCES: CPT | Performed by: UROLOGY

## 2021-07-24 PROCEDURE — 74178 CT ABD&PLV WO CNTR FLWD CNTR: CPT | Performed by: UROLOGY

## 2021-07-24 PROCEDURE — 82565 ASSAY OF CREATININE: CPT

## 2021-07-26 RX ORDER — ACETAMINOPHEN 500 MG
1000 TABLET ORAL ONCE
Status: CANCELLED | OUTPATIENT
Start: 2021-07-26 | End: 2021-07-26

## 2021-07-27 NOTE — PROGRESS NOTES
Biopsy negative. Several cores with inflammation. Spoke to patient OTP. Plan for cysto, TURBT as planned tomorrow.

## 2021-07-28 ENCOUNTER — ANESTHESIA (OUTPATIENT)
Dept: SURGERY | Facility: HOSPITAL | Age: 76
DRG: 666 | End: 2021-07-28
Payer: MEDICARE

## 2021-07-28 ENCOUNTER — TELEPHONE (OUTPATIENT)
Dept: SURGERY | Facility: CLINIC | Age: 76
End: 2021-07-28

## 2021-07-28 ENCOUNTER — ANESTHESIA EVENT (OUTPATIENT)
Dept: SURGERY | Facility: HOSPITAL | Age: 76
DRG: 666 | End: 2021-07-28
Payer: MEDICARE

## 2021-07-28 ENCOUNTER — HOSPITAL ENCOUNTER (INPATIENT)
Facility: HOSPITAL | Age: 76
LOS: 1 days | Discharge: HOME | DRG: 666 | End: 2021-07-29
Attending: UROLOGY | Admitting: UROLOGY
Payer: MEDICARE

## 2021-07-28 DIAGNOSIS — N40.1 BPH WITH OBSTRUCTION/LOWER URINARY TRACT SYMPTOMS: Primary | ICD-10-CM

## 2021-07-28 DIAGNOSIS — N13.8 BPH WITH OBSTRUCTION/LOWER URINARY TRACT SYMPTOMS: Primary | ICD-10-CM

## 2021-07-28 DIAGNOSIS — N32.89 BLADDER MASS: ICD-10-CM

## 2021-07-28 LAB
ATRIAL RATE: 76 BPM
DEPRECATED RDW RBC AUTO: 44.6 FL (ref 35.1–46.3)
ERYTHROCYTE [DISTWIDTH] IN BLOOD BY AUTOMATED COUNT: 13.8 % (ref 11–15)
GLUCOSE BLD-MCNC: 127 MG/DL (ref 70–99)
GLUCOSE BLD-MCNC: 177 MG/DL (ref 70–99)
HCT VFR BLD AUTO: 38.1 %
HGB BLD-MCNC: 13 G/DL
MCH RBC QN AUTO: 30.4 PG (ref 26–34)
MCHC RBC AUTO-ENTMCNC: 34.1 G/DL (ref 31–37)
MCV RBC AUTO: 89 FL
P AXIS: 22 DEGREES
P-R INTERVAL: 144 MS
PLATELET # BLD AUTO: 215 10(3)UL (ref 150–450)
Q-T INTERVAL: 406 MS
QRS DURATION: 124 MS
QTC CALCULATION (BEZET): 456 MS
R AXIS: -18 DEGREES
RBC # BLD AUTO: 4.28 X10(6)UL
SARS-COV-2 RNA RESP QL NAA+PROBE: NOT DETECTED
T AXIS: 18 DEGREES
VENTRICULAR RATE: 76 BPM
WBC # BLD AUTO: 6.3 X10(3) UL (ref 4–11)

## 2021-07-28 PROCEDURE — 0TBC8ZZ EXCISION OF BLADDER NECK, VIA NATURAL OR ARTIFICIAL OPENING ENDOSCOPIC: ICD-10-PCS | Performed by: UROLOGY

## 2021-07-28 PROCEDURE — 0VB08ZZ EXCISION OF PROSTATE, VIA NATURAL OR ARTIFICIAL OPENING ENDOSCOPIC: ICD-10-PCS | Performed by: UROLOGY

## 2021-07-28 PROCEDURE — 0TBB8ZZ EXCISION OF BLADDER, VIA NATURAL OR ARTIFICIAL OPENING ENDOSCOPIC: ICD-10-PCS | Performed by: UROLOGY

## 2021-07-28 RX ORDER — LEVOFLOXACIN 5 MG/ML
500 INJECTION, SOLUTION INTRAVENOUS ONCE
Status: COMPLETED | OUTPATIENT
Start: 2021-07-28 | End: 2021-07-28

## 2021-07-28 RX ORDER — ONDANSETRON 4 MG/1
4 TABLET, ORALLY DISINTEGRATING ORAL EVERY 6 HOURS PRN
Status: DISCONTINUED | OUTPATIENT
Start: 2021-07-28 | End: 2021-07-29

## 2021-07-28 RX ORDER — HYDROCODONE BITARTRATE AND ACETAMINOPHEN 5; 325 MG/1; MG/1
1 TABLET ORAL AS NEEDED
Status: DISCONTINUED | OUTPATIENT
Start: 2021-07-28 | End: 2021-07-28 | Stop reason: HOSPADM

## 2021-07-28 RX ORDER — HYDRALAZINE HYDROCHLORIDE 20 MG/ML
INJECTION INTRAMUSCULAR; INTRAVENOUS AS NEEDED
Status: DISCONTINUED | OUTPATIENT
Start: 2021-07-28 | End: 2021-07-28 | Stop reason: SURG

## 2021-07-28 RX ORDER — NALOXONE HYDROCHLORIDE 0.4 MG/ML
80 INJECTION, SOLUTION INTRAMUSCULAR; INTRAVENOUS; SUBCUTANEOUS AS NEEDED
Status: DISCONTINUED | OUTPATIENT
Start: 2021-07-28 | End: 2021-07-28 | Stop reason: HOSPADM

## 2021-07-28 RX ORDER — DEXTROSE MONOHYDRATE 25 G/50ML
50 INJECTION, SOLUTION INTRAVENOUS
Status: DISCONTINUED | OUTPATIENT
Start: 2021-07-28 | End: 2021-07-28 | Stop reason: HOSPADM

## 2021-07-28 RX ORDER — PRAVASTATIN SODIUM 10 MG
10 TABLET ORAL NIGHTLY
COMMUNITY
End: 2021-08-20

## 2021-07-28 RX ORDER — ACETAMINOPHEN 500 MG
1000 TABLET ORAL EVERY 8 HOURS SCHEDULED
Status: DISCONTINUED | OUTPATIENT
Start: 2021-07-28 | End: 2021-07-29

## 2021-07-28 RX ORDER — PANTOPRAZOLE SODIUM 20 MG/1
20 TABLET, DELAYED RELEASE ORAL
Status: DISCONTINUED | OUTPATIENT
Start: 2021-07-29 | End: 2021-07-29

## 2021-07-28 RX ORDER — ONDANSETRON 2 MG/ML
4 INJECTION INTRAMUSCULAR; INTRAVENOUS AS NEEDED
Status: DISCONTINUED | OUTPATIENT
Start: 2021-07-28 | End: 2021-07-28 | Stop reason: HOSPADM

## 2021-07-28 RX ORDER — BUDESONIDE AND FORMOTEROL FUMARATE DIHYDRATE 80; 4.5 UG/1; UG/1
2 AEROSOL RESPIRATORY (INHALATION) 2 TIMES DAILY
COMMUNITY
End: 2021-09-10

## 2021-07-28 RX ORDER — DIPHENHYDRAMINE HCL 50 MG
50 CAPSULE ORAL EVERY 6 HOURS PRN
COMMUNITY

## 2021-07-28 RX ORDER — LEVOFLOXACIN 5 MG/ML
750 INJECTION, SOLUTION INTRAVENOUS DAILY
Status: DISCONTINUED | OUTPATIENT
Start: 2021-07-29 | End: 2021-07-29

## 2021-07-28 RX ORDER — DIPHENHYDRAMINE HCL 50 MG
50 CAPSULE ORAL EVERY 6 HOURS PRN
Status: DISCONTINUED | OUTPATIENT
Start: 2021-07-28 | End: 2021-07-29

## 2021-07-28 RX ORDER — AMLODIPINE BESYLATE 5 MG/1
10 TABLET ORAL NIGHTLY
Status: DISCONTINUED | OUTPATIENT
Start: 2021-07-28 | End: 2021-07-29

## 2021-07-28 RX ORDER — NEOSTIGMINE METHYLSULFATE 1 MG/ML
INJECTION INTRAVENOUS AS NEEDED
Status: DISCONTINUED | OUTPATIENT
Start: 2021-07-28 | End: 2021-07-28 | Stop reason: SURG

## 2021-07-28 RX ORDER — SODIUM CHLORIDE, SODIUM LACTATE, POTASSIUM CHLORIDE, CALCIUM CHLORIDE 600; 310; 30; 20 MG/100ML; MG/100ML; MG/100ML; MG/100ML
INJECTION, SOLUTION INTRAVENOUS CONTINUOUS
Status: DISCONTINUED | OUTPATIENT
Start: 2021-07-28 | End: 2021-07-28 | Stop reason: HOSPADM

## 2021-07-28 RX ORDER — LIDOCAINE HYDROCHLORIDE 10 MG/ML
INJECTION, SOLUTION EPIDURAL; INFILTRATION; INTRACAUDAL; PERINEURAL AS NEEDED
Status: DISCONTINUED | OUTPATIENT
Start: 2021-07-28 | End: 2021-07-28 | Stop reason: SURG

## 2021-07-28 RX ORDER — ONDANSETRON 2 MG/ML
4 INJECTION INTRAMUSCULAR; INTRAVENOUS EVERY 6 HOURS PRN
Status: DISCONTINUED | OUTPATIENT
Start: 2021-07-28 | End: 2021-07-29

## 2021-07-28 RX ORDER — OXYCODONE HYDROCHLORIDE 5 MG/1
5 TABLET ORAL EVERY 4 HOURS PRN
Status: DISCONTINUED | OUTPATIENT
Start: 2021-07-28 | End: 2021-07-29

## 2021-07-28 RX ORDER — LABETALOL HYDROCHLORIDE 5 MG/ML
INJECTION, SOLUTION INTRAVENOUS AS NEEDED
Status: DISCONTINUED | OUTPATIENT
Start: 2021-07-28 | End: 2021-07-28 | Stop reason: SURG

## 2021-07-28 RX ORDER — GLYCOPYRROLATE 0.2 MG/ML
INJECTION, SOLUTION INTRAMUSCULAR; INTRAVENOUS AS NEEDED
Status: DISCONTINUED | OUTPATIENT
Start: 2021-07-28 | End: 2021-07-28 | Stop reason: SURG

## 2021-07-28 RX ORDER — ACETAMINOPHEN 500 MG
1000 TABLET ORAL ONCE AS NEEDED
Status: DISCONTINUED | OUTPATIENT
Start: 2021-07-28 | End: 2021-07-28 | Stop reason: HOSPADM

## 2021-07-28 RX ORDER — LOSARTAN POTASSIUM AND HYDROCHLOROTHIAZIDE 12.5; 1 MG/1; MG/1
1 TABLET ORAL DAILY
COMMUNITY
End: 2021-10-20

## 2021-07-28 RX ORDER — IPRATROPIUM BROMIDE 42 UG/1
2 SPRAY, METERED NASAL 2 TIMES DAILY
Status: DISCONTINUED | OUTPATIENT
Start: 2021-07-28 | End: 2021-07-29

## 2021-07-28 RX ORDER — HYDROCODONE BITARTRATE AND ACETAMINOPHEN 5; 325 MG/1; MG/1
2 TABLET ORAL AS NEEDED
Status: DISCONTINUED | OUTPATIENT
Start: 2021-07-28 | End: 2021-07-28 | Stop reason: HOSPADM

## 2021-07-28 RX ORDER — HYDROMORPHONE HYDROCHLORIDE 1 MG/ML
0.8 INJECTION, SOLUTION INTRAMUSCULAR; INTRAVENOUS; SUBCUTANEOUS EVERY 2 HOUR PRN
Status: DISCONTINUED | OUTPATIENT
Start: 2021-07-28 | End: 2021-07-29

## 2021-07-28 RX ORDER — CETIRIZINE HYDROCHLORIDE 10 MG/1
10 TABLET ORAL DAILY
Status: DISCONTINUED | OUTPATIENT
Start: 2021-07-28 | End: 2021-07-29

## 2021-07-28 RX ORDER — FLUTICASONE PROPIONATE 50 MCG
2 SPRAY, SUSPENSION (ML) NASAL DAILY
Status: DISCONTINUED | OUTPATIENT
Start: 2021-07-28 | End: 2021-07-29

## 2021-07-28 RX ORDER — DEXAMETHASONE SODIUM PHOSPHATE 4 MG/ML
VIAL (ML) INJECTION AS NEEDED
Status: DISCONTINUED | OUTPATIENT
Start: 2021-07-28 | End: 2021-07-28 | Stop reason: SURG

## 2021-07-28 RX ORDER — ROCURONIUM BROMIDE 10 MG/ML
INJECTION, SOLUTION INTRAVENOUS AS NEEDED
Status: DISCONTINUED | OUTPATIENT
Start: 2021-07-28 | End: 2021-07-28 | Stop reason: SURG

## 2021-07-28 RX ORDER — MAGNESIUM HYDROXIDE 1200 MG/15ML
LIQUID ORAL CONTINUOUS PRN
Status: COMPLETED | OUTPATIENT
Start: 2021-07-28 | End: 2021-07-28

## 2021-07-28 RX ORDER — HYDROMORPHONE HYDROCHLORIDE 1 MG/ML
0.4 INJECTION, SOLUTION INTRAMUSCULAR; INTRAVENOUS; SUBCUTANEOUS EVERY 5 MIN PRN
Status: DISCONTINUED | OUTPATIENT
Start: 2021-07-28 | End: 2021-07-28 | Stop reason: HOSPADM

## 2021-07-28 RX ORDER — HYDROMORPHONE HYDROCHLORIDE 1 MG/ML
INJECTION, SOLUTION INTRAMUSCULAR; INTRAVENOUS; SUBCUTANEOUS
Status: COMPLETED
Start: 2021-07-28 | End: 2021-07-28

## 2021-07-28 RX ORDER — OXYCODONE HYDROCHLORIDE 10 MG/1
10 TABLET ORAL EVERY 4 HOURS PRN
Status: DISCONTINUED | OUTPATIENT
Start: 2021-07-28 | End: 2021-07-29

## 2021-07-28 RX ORDER — HYDROMORPHONE HYDROCHLORIDE 1 MG/ML
0.4 INJECTION, SOLUTION INTRAMUSCULAR; INTRAVENOUS; SUBCUTANEOUS EVERY 2 HOUR PRN
Status: DISCONTINUED | OUTPATIENT
Start: 2021-07-28 | End: 2021-07-29

## 2021-07-28 RX ORDER — LEVOFLOXACIN 750 MG/1
750 TABLET ORAL DAILY
Status: DISCONTINUED | OUTPATIENT
Start: 2021-07-29 | End: 2021-07-29

## 2021-07-28 RX ORDER — ONDANSETRON 2 MG/ML
INJECTION INTRAMUSCULAR; INTRAVENOUS AS NEEDED
Status: DISCONTINUED | OUTPATIENT
Start: 2021-07-28 | End: 2021-07-28 | Stop reason: SURG

## 2021-07-28 RX ORDER — AMLODIPINE BESYLATE 10 MG/1
10 TABLET ORAL NIGHTLY
COMMUNITY
End: 2021-09-08

## 2021-07-28 RX ORDER — ENOXAPARIN SODIUM 100 MG/ML
40 INJECTION SUBCUTANEOUS DAILY
Status: DISCONTINUED | OUTPATIENT
Start: 2021-07-29 | End: 2021-07-29

## 2021-07-28 RX ORDER — DIPHENHYDRAMINE HYDROCHLORIDE 50 MG/ML
25 INJECTION INTRAMUSCULAR; INTRAVENOUS ONCE
Status: COMPLETED | OUTPATIENT
Start: 2021-07-28 | End: 2021-07-28

## 2021-07-28 RX ORDER — ALBUTEROL SULFATE 90 UG/1
2 AEROSOL, METERED RESPIRATORY (INHALATION) EVERY 4 HOURS PRN
Status: DISCONTINUED | OUTPATIENT
Start: 2021-07-28 | End: 2021-07-29

## 2021-07-28 RX ADMIN — LIDOCAINE HYDROCHLORIDE 50 MG: 10 INJECTION, SOLUTION EPIDURAL; INFILTRATION; INTRACAUDAL; PERINEURAL at 15:46:00

## 2021-07-28 RX ADMIN — NEOSTIGMINE METHYLSULFATE 5 MG: 1 INJECTION INTRAVENOUS at 17:40:00

## 2021-07-28 RX ADMIN — LEVOFLOXACIN 500 MG: 5 INJECTION, SOLUTION INTRAVENOUS at 15:57:00

## 2021-07-28 RX ADMIN — SODIUM CHLORIDE, SODIUM LACTATE, POTASSIUM CHLORIDE, CALCIUM CHLORIDE: 600; 310; 30; 20 INJECTION, SOLUTION INTRAVENOUS at 15:42:00

## 2021-07-28 RX ADMIN — HYDRALAZINE HYDROCHLORIDE 5 MG: 20 INJECTION INTRAMUSCULAR; INTRAVENOUS at 17:19:00

## 2021-07-28 RX ADMIN — ONDANSETRON 4 MG: 2 INJECTION INTRAMUSCULAR; INTRAVENOUS at 16:57:00

## 2021-07-28 RX ADMIN — LABETALOL HYDROCHLORIDE 2.5 MG: 5 INJECTION, SOLUTION INTRAVENOUS at 17:10:00

## 2021-07-28 RX ADMIN — LABETALOL HYDROCHLORIDE 2.5 MG: 5 INJECTION, SOLUTION INTRAVENOUS at 17:24:00

## 2021-07-28 RX ADMIN — HYDRALAZINE HYDROCHLORIDE 5 MG: 20 INJECTION INTRAMUSCULAR; INTRAVENOUS at 17:15:00

## 2021-07-28 RX ADMIN — SODIUM CHLORIDE, SODIUM LACTATE, POTASSIUM CHLORIDE, CALCIUM CHLORIDE: 600; 310; 30; 20 INJECTION, SOLUTION INTRAVENOUS at 16:57:00

## 2021-07-28 RX ADMIN — DEXAMETHASONE SODIUM PHOSPHATE 4 MG: 4 MG/ML VIAL (ML) INJECTION at 15:54:00

## 2021-07-28 RX ADMIN — ROCURONIUM BROMIDE 60 MG: 10 INJECTION, SOLUTION INTRAVENOUS at 15:47:00

## 2021-07-28 RX ADMIN — GLYCOPYRROLATE 0.8 MG: 0.2 INJECTION, SOLUTION INTRAMUSCULAR; INTRAVENOUS at 17:40:00

## 2021-07-28 NOTE — ANESTHESIA PROCEDURE NOTES
Airway  Date/Time: 7/28/2021 3:50 PM  Urgency: elective    Airway not difficult    General Information and Staff    Patient location during procedure: OR  Anesthesiologist: Nataliia Bueno MD  Performed: anesthesiologist     Indications and Patient Conditi

## 2021-07-28 NOTE — ANESTHESIA POSTPROCEDURE EVALUATION
[address] [facility] [facility] III Patient Status:  Inpatient   Age/Gender 78year old male MRN [id number]   Location [facility] Attending Angle Gann MD   Hosp Day # 0 PCP Carlos Kraus MD       Anesthesia Post

## 2021-07-28 NOTE — H&P
Pre-op Diagnosis: BPH with obstruction/lower urinary tract symptoms  (primary encounter diagnosis)  Bladder mass      The above referenced H&P was reviewed by Denilson Bernard MD on 7/28/2021, the patient was examined and no significant changes have occur negative). Drinks 60-80 oz water per day.     Erections good enough for masturbation and is not interested in any meds for ED at this time.     Prior PSAs:  - 10.30 6/30/21 (completed 4 weeks abx)  - 11.70 5/19/21  - 7.19 8/31/20 with 35% 4K score  - 3. given.  _________________________________        PROCEDURE NOTE    PROCEDURE PERFORMED: Trans-Rectal Ultrasound-Guided Prostate Biopsy    After informed consent was obtained, the patient was placed in the lateral decubitus position, and the Prostate was sc 2015   • Asthma 2011   • BLOOD CLOTS    • COPD (chronic obstructive pulmonary disease) ([facility] 2012   • Crohn disease ([facility] 4/15/2014   • Dermatitis due to drugs and medicines taken internally(693.0)    • Diabetes [facility] Nov. 2014   • Edema    • Elevated bloo hepatitis b/DJD   • Asthma Sister    • Other (Other) Sister         osteoarthritis   • Diabetes Brother    • Mental Disorder Brother    • Stroke Brother       Social History: Social History    Tobacco Use      Smoking status: Former Smoker        Packs/day history of MOMO; Standing  -     If patient has home continuous glucose monitor, PAT to instruct patient to leave continuous glucose monitor at home; Standing  -     Cancel: Prior to surgery, home continuous glucose monitor must be removed by the patient. ;

## 2021-07-28 NOTE — TELEPHONE ENCOUNTER
Patient is in surgery today 7/28/21  ----- Message from Dana Kimbrough MD sent at 7/25/2021  7:21 AM CDT -----  Results reviewed. CT shows no problems with either kidney at this time and no signs of metastatic disease.  The tumor is close to where the right

## 2021-07-28 NOTE — OR PREOP
Dr. Flakito Roy informed of rash and ekg. Here to see patient and rash. States to administer benadryl and he will see the patient in 1 hour. States he will discuss rash with Dr. Victor Favre.

## 2021-07-28 NOTE — ANESTHESIA PREPROCEDURE EVALUATION
PRE-OP EVALUATION    Patient Name: Isabella Jones III    Admit Diagnosis: Bladder mass [N32.89]  BPH with obstruction/lower urinary tract symptoms [N40.1, N13.8]    Pre-op Diagnosis: Bladder mass [N32.89]  BPH with obstruction/lower urinary tract sy at 0800  metFORMIN HCl 1000 MG Oral Tab, Take 1,000 mg by mouth 2 (two) times daily with meals. , Disp: , Rfl: , 7/27/2021 at 2000  Acetaminophen (ACETAMINOPHEN EXTRA STRENGTH) 500 MG Oral Cap, Take 1,000 mg by mouth one time. , Disp: , Rfl: , 7/28/2021 at 0 Endo/Other      (+) diabetes                     (+) arthritis       Pulmonary      (+) asthma  (+) COPD            (+) sleep apnea       Neuro/Psych                              OBESITY  HYPERLIPIDEMIA  OTHER DISEASES Quit date: 1/3/2008        Years since quittin.5      Smokeless tobacco: Never Used    Alcohol use: No      Alcohol/week: 0.0 standard drinks      Drug use: No     Available pre-op labs reviewed.   Lab Results   Component Value Date    WBC 6.3 20

## 2021-07-28 NOTE — OPERATIVE REPORT
Urology Operative Note    Attending Surgeon: Kathrin Yao    Patient Name: Rosalia Barajas III    Date of Surgery: 7/28/2021    Preoperative Diagnosis: multiple large bladder tumors    Postoperative Diagnosis: same    Procedure Performed: Cystoscopy, procedure well. Specimen: 1. bladder tumor, 2. TURP chips    Complications: No known complications    Condition on Discharge from the operating room was stable    Plan: Will remain on CBI overnight and hopefully home tomorrow.     Noman Sanchez  Date: 7/2

## 2021-07-29 ENCOUNTER — TELEPHONE (OUTPATIENT)
Dept: SURGERY | Facility: CLINIC | Age: 76
End: 2021-07-29

## 2021-07-29 VITALS
TEMPERATURE: 98 F | SYSTOLIC BLOOD PRESSURE: 132 MMHG | DIASTOLIC BLOOD PRESSURE: 54 MMHG | BODY MASS INDEX: 34.08 KG/M2 | OXYGEN SATURATION: 94 % | HEIGHT: 68 IN | HEART RATE: 78 BPM | WEIGHT: 224.88 LBS | RESPIRATION RATE: 21 BRPM

## 2021-07-29 LAB
GLUCOSE BLD-MCNC: 134 MG/DL (ref 70–99)
GLUCOSE BLD-MCNC: 156 MG/DL (ref 70–99)
HBV SURFACE AG SER-ACNC: <0.1 [IU]/L
HBV SURFACE AG SERPL QL IA: NONREACTIVE
HCV AB SERPL QL IA: NONREACTIVE
HIV 1+2 AB+HIV1 P24 AG SERPL QL IA: NONREACTIVE

## 2021-07-29 PROCEDURE — 99223 1ST HOSP IP/OBS HIGH 75: CPT | Performed by: HOSPITALIST

## 2021-07-29 RX ORDER — METHYLPREDNISOLONE SODIUM SUCCINATE 40 MG/ML
40 INJECTION, POWDER, LYOPHILIZED, FOR SOLUTION INTRAMUSCULAR; INTRAVENOUS EVERY 12 HOURS
Status: DISCONTINUED | OUTPATIENT
Start: 2021-07-29 | End: 2021-07-29

## 2021-07-29 RX ORDER — PREDNISONE 20 MG/1
40 TABLET ORAL DAILY
Qty: 6 TABLET | Refills: 0 | Status: SHIPPED | OUTPATIENT
Start: 2021-07-29 | End: 2021-08-01

## 2021-07-29 RX ORDER — HYDROCODONE BITARTRATE AND ACETAMINOPHEN 5; 325 MG/1; MG/1
1 TABLET ORAL EVERY 6 HOURS PRN
Qty: 30 TABLET | Refills: 0 | Status: SHIPPED | OUTPATIENT
Start: 2021-07-29

## 2021-07-29 RX ORDER — DIPHENHYDRAMINE HYDROCHLORIDE 50 MG/ML
50 INJECTION INTRAMUSCULAR; INTRAVENOUS EVERY 6 HOURS PRN
Status: DISCONTINUED | OUTPATIENT
Start: 2021-07-29 | End: 2021-07-29

## 2021-07-29 RX ORDER — PRAVASTATIN SODIUM 10 MG
10 TABLET ORAL NIGHTLY
Status: DISCONTINUED | OUTPATIENT
Start: 2021-07-29 | End: 2021-07-29

## 2021-07-29 RX ORDER — DEXTROSE MONOHYDRATE 25 G/50ML
50 INJECTION, SOLUTION INTRAVENOUS
Status: DISCONTINUED | OUTPATIENT
Start: 2021-07-29 | End: 2021-07-29

## 2021-07-29 RX ORDER — CIPROFLOXACIN 500 MG/1
500 TABLET, FILM COATED ORAL 2 TIMES DAILY
Qty: 12 TABLET | Refills: 0 | Status: SHIPPED | OUTPATIENT
Start: 2021-07-29 | End: 2021-08-04

## 2021-07-29 RX ORDER — DOCUSATE SODIUM 100 MG/1
100 CAPSULE, LIQUID FILLED ORAL 2 TIMES DAILY PRN
Qty: 30 CAPSULE | Refills: 0 | Status: SHIPPED | OUTPATIENT
Start: 2021-07-29 | End: 2021-11-01 | Stop reason: ALTCHOICE

## 2021-07-29 NOTE — TELEPHONE ENCOUNTER
Hi,    Could you please call this patient or their family and schedule the patient for a follow up with me in 2-3 weeks. 30 min cancer discussion. If you have trouble finding a spot let me know.     Thanks,    [facility]

## 2021-07-29 NOTE — TELEPHONE ENCOUNTER
[facility] your next available appt is 8/24.  Please advise if this is okay or you would like to see them sooner, thanks

## 2021-07-29 NOTE — PLAN OF CARE
Pt alert and oriented x4. Resting in chair during assessment. IV SL, flushed, IV intact. Tele, NSR HR 70's. CBI clamped; flores catheter draining clear/yellow urine. Pt tolerating regular diet. Accucheck QID.  Up with SB assist. Pt ambulated in hallway with appropriate  - Consider OT/PT consult to assist with strengthening/mobility  - Encourage toileting schedule  Outcome: Progressing     Problem: DISCHARGE PLANNING  Goal: Discharge to home or other facility with appropriate resources  Description: INTERVENTI Patient/Family Short Term Goal  Description: Patient's Short Term Goal: prepare for discharge    Interventions:   - flores care/monitoring    - See additional Care Plan goals for specific interventions  Outcome: Progressing

## 2021-07-29 NOTE — PROGRESS NOTES
Writing RN spoke with hospitalist Dr. Irasema Fregoso regarding abx patient would be discharged on while MD on unit this morning. Per hospitalist, patient may be discharged on abx prescribed. Will prepare for discharge.

## 2021-07-29 NOTE — PROGRESS NOTES
Pharmacy Note:   [employer] approved dose adjustment for: Levaquin    Shane Roa III has been prescribed Levaquin 500 mg IV or PO daily. CrCl estimated at 54.1 ml/min (based on labs from 5/19/21, Scr = 1.16) Body mass index is 34.19 kg/m².  Wt Barnie

## 2021-07-29 NOTE — PLAN OF CARE
Pt states relief from itching after second dose of Benedryl - rash feels much better. Dr Otis Yeung informed of above.

## 2021-07-29 NOTE — TELEPHONE ENCOUNTER
That is too far out. Please touch base with me in clinic tomorrow. We need to find a sooner appointment time.

## 2021-07-29 NOTE — DISCHARGE SUMMARY
[facility]    Urology Discharge Summary    Evon Moder III Patient Status:  Inpatient    1945 MRN [id number]   [facility] 3NW-A Attending Dorcas Bourgeois MD   Hosp Day # 1 PCP Crissy Mcfarland MD     Date of Admission:  sodium 100 MG Oral Cap  Take 1 capsule (100 mg total) by mouth 2 (two) times daily as needed for constipation. ciprofloxacin 500 MG Oral Tab  Take 1 tablet (500 mg total) by mouth 2 (two) times daily for 6 days.       Home Meds - Unchanged    pravastatin Discharge Instructions: You had a procedure called a TRANSURETHRAL RESECTION OF BLADDER TUMOR today    OK to use leg bag during the day. Use gravity bag at night and while sleeping.  Keep flores bag below the level of the bladder at all times to preven coffee, tea, artificial sweeteners, and spicy food for the next 48 hours as these can irritate the bladder.     - If you develop a fever, chills, difficulty urinating or abdominal pain in the next 24 hours, call the office.     - Drink 1.5 to 2 liters of f

## 2021-07-29 NOTE — PROGRESS NOTES
NURSING DISCHARGE NOTE    Discharged Home via Wheelchair. Accompanied by friend  Belongings Taken by patient/family. Pt discharged in stable condition via wheelchair accompanied by friend. Kramer instructions discussed and demonstrated.  Pt sent wi

## 2021-07-29 NOTE — CONSULTS
[facility]  [address] III Patient Status:  Inpatient    1945 MRN [id number]   [facility] 3NW-A Attending Sunday Cheung MD   Hosp Day # 1 PCP Alex Nolasco MD     Reason for consult:  Body rash    Req hemolytic pneumonia   • OTHER DISEASES     PCP pneumonia   • OTHER DISEASES     PE   • OTHER DISEASES     eczema   • Other specified disorders of pancreatic internal secretion    • Personal history of pneumonia (recurrent)    • Personal history of venous t encounter. pravastatin 10 MG Oral Tab, Take 10 mg by mouth nightly., Disp: , Rfl:   Budesonide-Formoterol Fumarate 80-4.5 MCG/ACT Inhalation Aerosol, Inhale 2 puffs into the lungs 2 (two) times daily. , Disp: , Rfl:   amLODIPine besylate 10 MG Oral Tab, Ta Temp 97.8 °F (36.6 °C) (Oral)   Resp 21   Ht 5' 8\" (1.727 m)   Wt 224 lb 13.9 oz (102 kg)   SpO2 94%   BMI 34.19 kg/m²   General: No acute distress. Alert and oriented x 3. HEENT: Normocephalic atraumatic. Moist mucous membranes. EOM-I. PERANDREW.  Shweta Rivers GERD-continue on PPI. 5.  MOMO-patient placed on MOMO protocol. Quality:  · DVT Prophylaxis: SCDs  · CODE status: Full  · Kramer: None    Plan of care discussed with patient at bedside.   If rash and itching are improved okay for patient be discharged home

## 2021-08-06 NOTE — PROGRESS NOTES
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 • Omeprazole 20 MG Oral Tab EC Take 20 mg by mouth daily. • Cholecalciferol (VITAMIN D3) 1000 UNITS Oral Cap Take 1 tablet by mouth daily.      • folic acid ([facility]  Office: [phone]

## 2021-08-11 ENCOUNTER — TELEPHONE (OUTPATIENT)
Dept: SURGERY | Facility: CLINIC | Age: 76
End: 2021-08-11

## 2021-08-11 ENCOUNTER — OFFICE VISIT (OUTPATIENT)
Dept: SURGERY | Facility: CLINIC | Age: 76
End: 2021-08-11
Payer: MEDICARE

## 2021-08-11 DIAGNOSIS — C67.8 MALIGNANT NEOPLASM OF OVERLAPPING SITES OF BLADDER (HCC): Primary | ICD-10-CM

## 2021-08-11 DIAGNOSIS — R97.20 ELEVATED PSA: ICD-10-CM

## 2021-08-11 DIAGNOSIS — C67.9 MALIGNANT NEOPLASM OF URINARY BLADDER, UNSPECIFIED SITE (HCC): Primary | ICD-10-CM

## 2021-08-11 DIAGNOSIS — N13.8 BPH WITH OBSTRUCTION/LOWER URINARY TRACT SYMPTOMS: ICD-10-CM

## 2021-08-11 DIAGNOSIS — N40.1 BPH WITH OBSTRUCTION/LOWER URINARY TRACT SYMPTOMS: ICD-10-CM

## 2021-08-11 DIAGNOSIS — N52.9 ERECTILE DYSFUNCTION, UNSPECIFIED ERECTILE DYSFUNCTION TYPE: ICD-10-CM

## 2021-08-11 LAB
APPEARANCE: CLEAR
BILIRUBIN: NEGATIVE
GLUCOSE (URINE DIPSTICK): NEGATIVE MG/DL
KETONES (URINE DIPSTICK): NEGATIVE MG/DL
MULTISTIX LOT#: ABNORMAL NUMERIC
NITRITE, URINE: NEGATIVE
PH, URINE: 5.5 (ref 4.5–8)
PROTEIN (URINE DIPSTICK): NEGATIVE MG/DL
SPECIFIC GRAVITY: 1.01 (ref 1–1.03)
URINE-COLOR: YELLOW
UROBILINOGEN,SEMI-QN: 0.2 MG/DL (ref 0–1.9)

## 2021-08-11 PROCEDURE — 1111F DSCHRG MED/CURRENT MED MERGE: CPT | Performed by: UROLOGY

## 2021-08-11 PROCEDURE — 81003 URINALYSIS AUTO W/O SCOPE: CPT | Performed by: UROLOGY

## 2021-08-11 PROCEDURE — 99024 POSTOP FOLLOW-UP VISIT: CPT | Performed by: UROLOGY

## 2021-08-11 RX ORDER — SILDENAFIL 100 MG/1
100 TABLET, FILM COATED ORAL
Qty: 30 TABLET | Refills: 5 | Status: SHIPPED | OUTPATIENT
Start: 2021-08-11

## 2021-08-11 NOTE — TELEPHONE ENCOUNTER
Roberta Charles,  Could you please schedule this patient for surgery? Hoping for September. Thank you!     Julian Del Real    Urology Surgery Request  Surgeon: Yobani Galarza (if known): [facility]  Procedure: cysto, re-staging TURBT with intravesical gemcitabine   Anesthesia:

## 2021-08-12 NOTE — TELEPHONE ENCOUNTER
Called patient this date and left a message for him to call me back directly at [phone] to schedule

## 2021-08-16 ENCOUNTER — PATIENT MESSAGE (OUTPATIENT)
Dept: SURGERY | Facility: CLINIC | Age: 76
End: 2021-08-16

## 2021-08-16 DIAGNOSIS — N40.1 BPH WITH OBSTRUCTION/LOWER URINARY TRACT SYMPTOMS: ICD-10-CM

## 2021-08-16 DIAGNOSIS — N52.9 ERECTILE DYSFUNCTION, UNSPECIFIED ERECTILE DYSFUNCTION TYPE: ICD-10-CM

## 2021-08-16 DIAGNOSIS — C67.8 MALIGNANT NEOPLASM OF OVERLAPPING SITES OF BLADDER (HCC): Primary | ICD-10-CM

## 2021-08-16 DIAGNOSIS — N32.89 BLADDER MASS: ICD-10-CM

## 2021-08-16 DIAGNOSIS — R97.20 ELEVATED PSA: ICD-10-CM

## 2021-08-16 DIAGNOSIS — N13.8 BPH WITH OBSTRUCTION/LOWER URINARY TRACT SYMPTOMS: ICD-10-CM

## 2021-08-17 NOTE — TELEPHONE ENCOUNTER
Below is patient's message sent via [employer]:     From: Angie Bertrand III  To: Christi Kuhn MD  Sent: 8/16/2021 10:20 AM CDT  Subject: Prescription Question    I have a prescription to have a CT scan performed.  Please remind me when I am to do this

## 2021-08-18 NOTE — TELEPHONE ENCOUNTER
OK to schedule procedure with Cheri Law. Ticket was already sent. No need for imaging prior and I do not need to see him prior unless he has questions for me. If so we can set up a video visit to discuss. I'm OK adding on Friday if needed.

## 2021-08-19 NOTE — TELEPHONE ENCOUNTER
Called patient this date and scheduled him for surgery on 9/23/21 at [facility].  Reviewed pre-op instructions with patient and he voiced understanding. Also sent thru My Chart this date.   Patient was instructed to contact me with further questions a

## 2021-08-19 NOTE — TELEPHONE ENCOUNTER
My chart message sent to pt    \"    Good Morning,     Dr. Megan Jin confirmed that you do not need any imaging before the procedure. I sent your request to schedule the procedure to Elvis Rangel our surgery scheduler. You can call her at directly at [phone].

## 2021-08-20 RX ORDER — PRAVASTATIN SODIUM 10 MG
TABLET ORAL
Qty: 90 TABLET | Refills: 1 | Status: SHIPPED | OUTPATIENT
Start: 2021-08-20

## 2021-08-25 ENCOUNTER — TELEPHONE (OUTPATIENT)
Dept: INTERNAL MEDICINE CLINIC | Facility: CLINIC | Age: 76
End: 2021-08-25

## 2021-08-25 NOTE — TELEPHONE ENCOUNTER
Pt called to schedule Pre-op.  Appt scheduled for   Future Appointments   Date Time Provider Mini Barber   9/28/2021  1:40 PM HAYDEE Tejeda [facility]         Surgery date: 10/21/21  Surgeon: Dr Sameera Calles     Ph# [phone]  Fax# [phone]

## 2021-08-30 RX ORDER — AMLODIPINE BESYLATE 10 MG/1
TABLET ORAL
Qty: 90 TABLET | Refills: 0 | OUTPATIENT
Start: 2021-08-30

## 2021-09-02 ENCOUNTER — PATIENT MESSAGE (OUTPATIENT)
Dept: INTERNAL MEDICINE CLINIC | Facility: CLINIC | Age: 76
End: 2021-09-02

## 2021-09-03 RX ORDER — BLOOD SUGAR DIAGNOSTIC
STRIP MISCELLANEOUS
Qty: 200 EACH | Refills: 2 | Status: SHIPPED | OUTPATIENT
Start: 2021-09-03 | End: 2021-09-28

## 2021-09-03 NOTE — TELEPHONE ENCOUNTER
From: Isabella Jones III  To: Reena Singletary MD  Sent: 9/2/2021 10:46 AM CDT  Subject: Prescription Question    Two things  !) Please approve [facility]'s refill request for Amlodipine Besylate  and  2) Please send a script to [facility] in [address] [place] [address] for One T

## 2021-09-08 ENCOUNTER — PATIENT MESSAGE (OUTPATIENT)
Dept: INTERNAL MEDICINE CLINIC | Facility: CLINIC | Age: 76
End: 2021-09-08

## 2021-09-08 ENCOUNTER — TELEPHONE (OUTPATIENT)
Dept: INTERNAL MEDICINE CLINIC | Facility: CLINIC | Age: 76
End: 2021-09-08

## 2021-09-08 RX ORDER — AMLODIPINE BESYLATE 10 MG/1
TABLET ORAL
Qty: 90 TABLET | Refills: 0 | Status: SHIPPED | OUTPATIENT
Start: 2021-09-08 | End: 2021-11-18

## 2021-09-08 NOTE — TELEPHONE ENCOUNTER
Received Rx request from [facility] for One Touch Ultra Vitro Strip 200 ea 2x's daily.   Placed in Dr. Estevan boyd for approval.

## 2021-09-09 ENCOUNTER — OFFICE VISIT (OUTPATIENT)
Dept: INTERNAL MEDICINE CLINIC | Facility: CLINIC | Age: 76
End: 2021-09-09
Payer: MEDICARE

## 2021-09-09 VITALS
HEART RATE: 80 BPM | SYSTOLIC BLOOD PRESSURE: 118 MMHG | DIASTOLIC BLOOD PRESSURE: 60 MMHG | HEIGHT: 68 IN | BODY MASS INDEX: 34.56 KG/M2 | WEIGHT: 228 LBS | TEMPERATURE: 97 F

## 2021-09-09 DIAGNOSIS — Z86.711 HISTORY OF PULMONARY EMBOLUS (PE): ICD-10-CM

## 2021-09-09 DIAGNOSIS — C67.9 MALIGNANT NEOPLASM OF URINARY BLADDER, UNSPECIFIED SITE (HCC): ICD-10-CM

## 2021-09-09 DIAGNOSIS — E11.29 TYPE 2 DIABETES MELLITUS WITH MICROALBUMINURIA, WITHOUT LONG-TERM CURRENT USE OF INSULIN (HCC): ICD-10-CM

## 2021-09-09 DIAGNOSIS — T37.95XA ALLERGIC DRUG RASH DUE TO ANTI-INFECTIVE AGENT: ICD-10-CM

## 2021-09-09 DIAGNOSIS — Z00.00 ENCOUNTER FOR ANNUAL HEALTH EXAMINATION: ICD-10-CM

## 2021-09-09 DIAGNOSIS — M06.4 INFLAMMATORY POLYARTHRITIS (HCC): ICD-10-CM

## 2021-09-09 DIAGNOSIS — L27.0 ALLERGIC DRUG RASH DUE TO ANTI-INFECTIVE AGENT: ICD-10-CM

## 2021-09-09 DIAGNOSIS — Z87.19 HISTORY OF CROHN'S DISEASE: ICD-10-CM

## 2021-09-09 DIAGNOSIS — R97.20 ELEVATED PSA: ICD-10-CM

## 2021-09-09 DIAGNOSIS — Z86.718 HISTORY OF DVT IN ADULTHOOD: ICD-10-CM

## 2021-09-09 DIAGNOSIS — E11.69 DYSLIPIDEMIA ASSOCIATED WITH TYPE 2 DIABETES MELLITUS (HCC): ICD-10-CM

## 2021-09-09 DIAGNOSIS — N40.1 BENIGN PROSTATIC HYPERPLASIA WITH LOWER URINARY TRACT SYMPTOMS, SYMPTOM DETAILS UNSPECIFIED: ICD-10-CM

## 2021-09-09 DIAGNOSIS — R80.9 TYPE 2 DIABETES MELLITUS WITH MICROALBUMINURIA, WITHOUT LONG-TERM CURRENT USE OF INSULIN (HCC): ICD-10-CM

## 2021-09-09 DIAGNOSIS — Z86.2 HISTORY OF HEMOLYTIC ANEMIA: ICD-10-CM

## 2021-09-09 DIAGNOSIS — I10 ESSENTIAL HYPERTENSION: Primary | ICD-10-CM

## 2021-09-09 DIAGNOSIS — J44.9 CHRONIC OBSTRUCTIVE PULMONARY DISEASE, UNSPECIFIED COPD TYPE (HCC): Chronic | ICD-10-CM

## 2021-09-09 DIAGNOSIS — E55.9 VITAMIN D DEFICIENCY: ICD-10-CM

## 2021-09-09 DIAGNOSIS — E78.5 DYSLIPIDEMIA ASSOCIATED WITH TYPE 2 DIABETES MELLITUS (HCC): ICD-10-CM

## 2021-09-09 DIAGNOSIS — D80.1 HYPOGAMMAGLOBULINEMIA (HCC): ICD-10-CM

## 2021-09-09 DIAGNOSIS — J30.1 ALLERGIC RHINITIS DUE TO POLLEN, UNSPECIFIED SEASONALITY: ICD-10-CM

## 2021-09-09 DIAGNOSIS — G47.33 OSA TREATED WITH BIPAP: ICD-10-CM

## 2021-09-09 DIAGNOSIS — R94.31 NONSPECIFIC ABNORMAL ELECTROCARDIOGRAM (ECG) (EKG): ICD-10-CM

## 2021-09-09 DIAGNOSIS — K21.00 GASTROESOPHAGEAL REFLUX DISEASE WITH ESOPHAGITIS WITHOUT HEMORRHAGE: Chronic | ICD-10-CM

## 2021-09-09 PROBLEM — Z87.59 HISTORY OF MATERNAL DEEP VEIN THROMBOSIS (DVT): Status: ACTIVE | Noted: 2021-09-09

## 2021-09-09 PROCEDURE — G0439 PPPS, SUBSEQ VISIT: HCPCS | Performed by: NURSE PRACTITIONER

## 2021-09-09 PROCEDURE — 99214 OFFICE O/P EST MOD 30 MIN: CPT | Performed by: NURSE PRACTITIONER

## 2021-09-09 NOTE — TELEPHONE ENCOUNTER
From: Isabella Jones III  To: Reena Singletary MD  Sent: 9/8/2021 9:45 AM CDT  Subject: Prescription Question    Is there some reason my Amlodipine Besylate prescription cannot be refilled?  [facility] tells me they have requested a renewal of the prescripti

## 2021-09-09 NOTE — PROGRESS NOTES
HPI:   Sammarinese Comment III is a 78year old male who presents for a Medicare Subsequent Annual Wellness visit (Pt already had Initial Annual Wellness). He has deferred the covid vaccine.    Essential hypertension  Amlodipine 10mg with losartan hct unspecified  Per Dr Ansley Hernandez    Elevated PSA  Per Dr Ansley Hernandez    History of DVT in adulthood  Stable will see Dr Judit Puente. -     OP REFERRAL TO [facility] HEMATOLOGY/ONCOLOGY GROUP    History of pulmonary embolus (PE)  Stable  Will see Dr Judit Puente.     -     OP Assistant)  HAYDEE Robison as Consulting Physician (Nurse Practitioner)  HAYDEE Toth (Nurse Practitioner Acute Care)    Patient Active Problem List:     Allergic rhinitis     Nonspecific abnormal electrocardiogram (ECG) (EKG)     Vitamin D Tab, TAKE 1 TABLET BY MOUTH EVERY DAY IN THE EVENING  Sildenafil Citrate 100 MG Oral Tab, Take 1 tablet (100 mg total) by mouth daily as needed for Erectile Dysfunction.  Take ~ one hour prior to sexual activity on an empty stomach  HYDROcodone-acetaminophe (2011), Arthritis (2015), Asthma (2011), BLOOD CLOTS, COPD ([facility] obstructive [facility]) [facility] (2012), Crohn disease ([facility] (4/15/2014), Dermatitis due to drugs and medicines taken internally(693.0), Diabetes ([facility] (Nov. 2014), Edema, Elevated blo Patient Position: Sitting, Cuff Size: adult)   Pulse 80   Temp 97.3 °F (36.3 °C) (Temporal)   Ht 5' 8\" (1.727 m)   Wt 228 lb (103.4 kg)   BMI 34.67 kg/m²   Estimated body mass index is 34.67 kg/m² as calculated from the following:    Height as of this enc months and older PFS 0.5 ml (06639) 09/30/2014   • HIGH DOSE FLU 65 YRS AND OLDER PRSV FREE SINGLE D (18319) FLU CLINIC 10/29/2020   • Influenza 10/05/2011, 10/15/2013, 10/26/2018   • Pneumococcal (Prevnar 13) 12/17/2015   • Pneumovax 23 10/15/2013 OP REFERRAL TO [facility]    Encounter for annual health examination    Malignant neoplasm of urinary bladder, unspecified site [facility])  Per Dr Danielle Navarro    Benign prostatic hyperplasia with lower urinary tract symptoms, symptom det Panel  Cholesterol  Lipoprotein (HDL)  Triglycerides Covered every 5 years for all Medicare beneficiaries without apparent signs or symptoms of cardiovascular disease Lab Results   Component Value Date    CHOLEST 184 05/19/2021    HDL 39 (L) 05/19/2021 prescription benefits -  Zoster Vaccines(1 of 2) Never done       Diabetes      Hemoglobin A1C Annually; if last result is elevated, may be asked to retest more frequently.     Medicare covers every 3 months Lab Results   Component Value Date     (H)

## 2021-09-09 NOTE — PATIENT INSTRUCTIONS
Jasmyn De La Garza III's SCREENING SCHEDULE   Tests on this list are recommended by your physician but may not be covered, or covered at this frequency, by your insurer. Please check with your insurance carrier before scheduling to verify coverage. season  Please get every year 10/29/2020  No recommendations at this time    Pneumococcal Each vaccine (Chdpsml76 & Yqrhawnex63) covered once after 65 Prevnar 13: 12/17/2015    Tbdhmhnyp89: 10/15/2013     No recommendations at this time    Hepatitis B One form from the [facility]. http://www. idph.[facility]. [place]/public/books/advin.htm  A link to the [employer].  This site has a lot of good information including definitions of the different types of Advance Dire

## 2021-09-22 ENCOUNTER — ANESTHESIA EVENT (OUTPATIENT)
Dept: SURGERY | Facility: HOSPITAL | Age: 76
End: 2021-09-22
Payer: MEDICARE

## 2021-09-23 ENCOUNTER — APPOINTMENT (OUTPATIENT)
Dept: GENERAL RADIOLOGY | Facility: HOSPITAL | Age: 76
End: 2021-09-23
Attending: UROLOGY
Payer: MEDICARE

## 2021-09-23 ENCOUNTER — ANESTHESIA (OUTPATIENT)
Dept: SURGERY | Facility: HOSPITAL | Age: 76
End: 2021-09-23
Payer: MEDICARE

## 2021-09-23 ENCOUNTER — HOSPITAL ENCOUNTER (OUTPATIENT)
Facility: HOSPITAL | Age: 76
Setting detail: HOSPITAL OUTPATIENT SURGERY
Discharge: HOME | End: 2021-09-23
Attending: UROLOGY | Admitting: UROLOGY
Payer: MEDICARE

## 2021-09-23 VITALS
TEMPERATURE: 97 F | OXYGEN SATURATION: 96 % | HEIGHT: 68 IN | WEIGHT: 222.13 LBS | SYSTOLIC BLOOD PRESSURE: 147 MMHG | BODY MASS INDEX: 33.67 KG/M2 | HEART RATE: 68 BPM | DIASTOLIC BLOOD PRESSURE: 69 MMHG | RESPIRATION RATE: 16 BRPM

## 2021-09-23 DIAGNOSIS — C67.9 MALIGNANT NEOPLASM OF URINARY BLADDER, UNSPECIFIED SITE (HCC): ICD-10-CM

## 2021-09-23 LAB
ANION GAP SERPL CALC-SCNC: 7 MMOL/L (ref 0–18)
BUN BLD-MCNC: 15 MG/DL (ref 7–18)
CALCIUM BLD-MCNC: 9.2 MG/DL (ref 8.5–10.1)
CHLORIDE SERPL-SCNC: 105 MMOL/L (ref 98–112)
CO2 SERPL-SCNC: 25 MMOL/L (ref 21–32)
CREAT BLD-MCNC: 0.97 MG/DL
GLUCOSE BLD-MCNC: 136 MG/DL (ref 70–99)
GLUCOSE BLD-MCNC: 153 MG/DL (ref 70–99)
GLUCOSE BLD-MCNC: 167 MG/DL (ref 70–99)
OSMOLALITY SERPL CALC.SUM OF ELEC: 288 MOSM/KG (ref 275–295)
POTASSIUM SERPL-SCNC: 3.9 MMOL/L (ref 3.5–5.1)
SARS-COV-2 RNA RESP QL NAA+PROBE: NOT DETECTED
SODIUM SERPL-SCNC: 137 MMOL/L (ref 136–145)

## 2021-09-23 PROCEDURE — 80048 BASIC METABOLIC PNL TOTAL CA: CPT

## 2021-09-23 PROCEDURE — 88307 TISSUE EXAM BY PATHOLOGIST: CPT | Performed by: UROLOGY

## 2021-09-23 PROCEDURE — 0TBB8ZX EXCISION OF BLADDER, VIA NATURAL OR ARTIFICIAL OPENING ENDOSCOPIC, DIAGNOSTIC: ICD-10-PCS | Performed by: UROLOGY

## 2021-09-23 PROCEDURE — 82962 GLUCOSE BLOOD TEST: CPT

## 2021-09-23 RX ORDER — ACETAMINOPHEN 500 MG
1000 TABLET ORAL ONCE
Status: DISCONTINUED | OUTPATIENT
Start: 2021-09-23 | End: 2021-09-23

## 2021-09-23 RX ORDER — SODIUM CHLORIDE, SODIUM LACTATE, POTASSIUM CHLORIDE, CALCIUM CHLORIDE 600; 310; 30; 20 MG/100ML; MG/100ML; MG/100ML; MG/100ML
INJECTION, SOLUTION INTRAVENOUS CONTINUOUS
Status: DISCONTINUED | OUTPATIENT
Start: 2021-09-23 | End: 2021-09-23

## 2021-09-23 RX ORDER — ROCURONIUM BROMIDE 10 MG/ML
INJECTION, SOLUTION INTRAVENOUS AS NEEDED
Status: DISCONTINUED | OUTPATIENT
Start: 2021-09-23 | End: 2021-09-23 | Stop reason: SURG

## 2021-09-23 RX ORDER — PHENYLEPHRINE HCL 10 MG/ML
VIAL (ML) INJECTION AS NEEDED
Status: DISCONTINUED | OUTPATIENT
Start: 2021-09-23 | End: 2021-09-23 | Stop reason: SURG

## 2021-09-23 RX ORDER — HYDROCODONE BITARTRATE AND ACETAMINOPHEN 5; 325 MG/1; MG/1
2 TABLET ORAL AS NEEDED
Status: DISCONTINUED | OUTPATIENT
Start: 2021-09-23 | End: 2021-09-23

## 2021-09-23 RX ORDER — LEVOFLOXACIN 5 MG/ML
500 INJECTION, SOLUTION INTRAVENOUS ONCE
Status: COMPLETED | OUTPATIENT
Start: 2021-09-23 | End: 2021-09-23

## 2021-09-23 RX ORDER — ONDANSETRON 2 MG/ML
INJECTION INTRAMUSCULAR; INTRAVENOUS AS NEEDED
Status: DISCONTINUED | OUTPATIENT
Start: 2021-09-23 | End: 2021-09-23 | Stop reason: SURG

## 2021-09-23 RX ORDER — MIDAZOLAM HYDROCHLORIDE 1 MG/ML
INJECTION INTRAMUSCULAR; INTRAVENOUS AS NEEDED
Status: DISCONTINUED | OUTPATIENT
Start: 2021-09-23 | End: 2021-09-23 | Stop reason: SURG

## 2021-09-23 RX ORDER — KETOROLAC TROMETHAMINE 30 MG/ML
INJECTION, SOLUTION INTRAMUSCULAR; INTRAVENOUS AS NEEDED
Status: DISCONTINUED | OUTPATIENT
Start: 2021-09-23 | End: 2021-09-23 | Stop reason: SURG

## 2021-09-23 RX ORDER — NALOXONE HYDROCHLORIDE 0.4 MG/ML
80 INJECTION, SOLUTION INTRAMUSCULAR; INTRAVENOUS; SUBCUTANEOUS AS NEEDED
Status: DISCONTINUED | OUTPATIENT
Start: 2021-09-23 | End: 2021-09-23

## 2021-09-23 RX ORDER — DOCUSATE SODIUM 100 MG/1
100 CAPSULE, LIQUID FILLED ORAL 2 TIMES DAILY PRN
Qty: 30 CAPSULE | Refills: 0 | Status: SHIPPED | OUTPATIENT
Start: 2021-09-23 | End: 2021-11-01 | Stop reason: ALTCHOICE

## 2021-09-23 RX ORDER — LABETALOL HYDROCHLORIDE 5 MG/ML
5 INJECTION, SOLUTION INTRAVENOUS EVERY 5 MIN PRN
Status: DISCONTINUED | OUTPATIENT
Start: 2021-09-23 | End: 2021-09-23

## 2021-09-23 RX ORDER — DEXTROSE MONOHYDRATE 25 G/50ML
50 INJECTION, SOLUTION INTRAVENOUS
Status: DISCONTINUED | OUTPATIENT
Start: 2021-09-23 | End: 2021-09-23

## 2021-09-23 RX ORDER — METOCLOPRAMIDE HYDROCHLORIDE 5 MG/ML
INJECTION INTRAMUSCULAR; INTRAVENOUS AS NEEDED
Status: DISCONTINUED | OUTPATIENT
Start: 2021-09-23 | End: 2021-09-23 | Stop reason: SURG

## 2021-09-23 RX ORDER — CIPROFLOXACIN 500 MG/1
500 TABLET, FILM COATED ORAL 2 TIMES DAILY
Qty: 8 TABLET | Refills: 0 | Status: SHIPPED | OUTPATIENT
Start: 2021-09-23 | End: 2021-09-27

## 2021-09-23 RX ORDER — HYDROMORPHONE HYDROCHLORIDE 1 MG/ML
0.4 INJECTION, SOLUTION INTRAMUSCULAR; INTRAVENOUS; SUBCUTANEOUS EVERY 5 MIN PRN
Status: DISCONTINUED | OUTPATIENT
Start: 2021-09-23 | End: 2021-09-23

## 2021-09-23 RX ORDER — ACETAMINOPHEN 500 MG
1000 TABLET ORAL ONCE AS NEEDED
Status: DISCONTINUED | OUTPATIENT
Start: 2021-09-23 | End: 2021-09-23

## 2021-09-23 RX ORDER — MEPERIDINE HYDROCHLORIDE 25 MG/ML
12.5 INJECTION INTRAMUSCULAR; INTRAVENOUS; SUBCUTANEOUS AS NEEDED
Status: DISCONTINUED | OUTPATIENT
Start: 2021-09-23 | End: 2021-09-23

## 2021-09-23 RX ORDER — HYDROCODONE BITARTRATE AND ACETAMINOPHEN 5; 325 MG/1; MG/1
1 TABLET ORAL EVERY 6 HOURS PRN
Qty: 20 TABLET | Refills: 0 | Status: SHIPPED | OUTPATIENT
Start: 2021-09-23 | End: 2021-09-28 | Stop reason: ALTCHOICE

## 2021-09-23 RX ORDER — LIDOCAINE HYDROCHLORIDE 10 MG/ML
INJECTION, SOLUTION EPIDURAL; INFILTRATION; INTRACAUDAL; PERINEURAL AS NEEDED
Status: DISCONTINUED | OUTPATIENT
Start: 2021-09-23 | End: 2021-09-23 | Stop reason: SURG

## 2021-09-23 RX ORDER — DEXAMETHASONE SODIUM PHOSPHATE 4 MG/ML
VIAL (ML) INJECTION AS NEEDED
Status: DISCONTINUED | OUTPATIENT
Start: 2021-09-23 | End: 2021-09-23 | Stop reason: SURG

## 2021-09-23 RX ORDER — DEXTROSE MONOHYDRATE 25 G/50ML
50 INJECTION, SOLUTION INTRAVENOUS
Status: DISCONTINUED | OUTPATIENT
Start: 2021-09-23 | End: 2021-09-23 | Stop reason: HOSPADM

## 2021-09-23 RX ORDER — HYDROCODONE BITARTRATE AND ACETAMINOPHEN 5; 325 MG/1; MG/1
1 TABLET ORAL AS NEEDED
Status: DISCONTINUED | OUTPATIENT
Start: 2021-09-23 | End: 2021-09-23

## 2021-09-23 RX ADMIN — DEXAMETHASONE SODIUM PHOSPHATE 4 MG: 4 MG/ML VIAL (ML) INJECTION at 14:16:00

## 2021-09-23 RX ADMIN — LIDOCAINE HYDROCHLORIDE 50 MG: 10 INJECTION, SOLUTION EPIDURAL; INFILTRATION; INTRACAUDAL; PERINEURAL at 14:04:00

## 2021-09-23 RX ADMIN — LEVOFLOXACIN 500 MG: 5 INJECTION, SOLUTION INTRAVENOUS at 14:13:00

## 2021-09-23 RX ADMIN — ONDANSETRON 4 MG: 2 INJECTION INTRAMUSCULAR; INTRAVENOUS at 14:16:00

## 2021-09-23 RX ADMIN — METOCLOPRAMIDE HYDROCHLORIDE 10 MG: 5 INJECTION INTRAMUSCULAR; INTRAVENOUS at 14:16:00

## 2021-09-23 RX ADMIN — KETOROLAC TROMETHAMINE 15 MG: 30 INJECTION, SOLUTION INTRAMUSCULAR; INTRAVENOUS at 14:55:00

## 2021-09-23 RX ADMIN — PHENYLEPHRINE HCL 50 MCG: 10 MG/ML VIAL (ML) INJECTION at 14:16:00

## 2021-09-23 RX ADMIN — SODIUM CHLORIDE, SODIUM LACTATE, POTASSIUM CHLORIDE, CALCIUM CHLORIDE: 600; 310; 30; 20 INJECTION, SOLUTION INTRAVENOUS at 15:08:00

## 2021-09-23 RX ADMIN — MIDAZOLAM HYDROCHLORIDE 2 MG: 1 INJECTION INTRAMUSCULAR; INTRAVENOUS at 14:06:00

## 2021-09-23 RX ADMIN — ROCURONIUM BROMIDE 50 MG: 10 INJECTION, SOLUTION INTRAVENOUS at 14:09:00

## 2021-09-23 RX ADMIN — PHENYLEPHRINE HCL 100 MCG: 10 MG/ML VIAL (ML) INJECTION at 14:38:00

## 2021-09-23 NOTE — ANESTHESIA PREPROCEDURE EVALUATION
PRE-OP EVALUATION    Patient Name: James Butler III    Admit Diagnosis: Malignant neoplasm of urinary bladder, unspecified site [facility]) [C67.9]    Pre-op Diagnosis: Malignant neoplasm of urinary bladder, unspecified site [facility]) [C67.9]    Cystoscopy, 40.00        Pack years: 60        Types: Cigarettes, Pipe, Cigars        Start date: 1962        Quit date: 1/3/2008        Years since quittin.7      Smokeless tobacco: Never Used    Alcohol use: No      Alcohol/week: 0.0 standard drinks      D

## 2021-09-23 NOTE — PROGRESS NOTES
HPI:     Ang Soler III is a 78year old male (l[place], [facility]) with a PMH of HTN, DM, DVT/PE in 2011 during episode of hemolytic anemia (no blood thinners since 2012), COPD, MOMO, vasectomy, stress-induced rashes    then annually up to 10 y from diagnosis, then as indicated  - baseline upper tract imaging, then every 1-2 y up to 10 y from diagnosis, then as indicated.     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 • Glucose Blood In Vitro Strip 1 each by Other route daily. Dx: E11.9 100 strip 3   • Fexofenadine HCl (ALLEGRA OR) Take 180 mg by mouth daily.        • Omeprazole 20 MG Oral Tab EC Take 20 mg by m

## 2021-09-23 NOTE — H&P
HPI:     Smith Ivy III is a 78year old male ([place]ives [place], [facility]) with a PMH of HTN, DM, DVT/PE in 2011 during episode of hemolytic anemia (no blood thinners since 2012), COPD, MOMO, vasectomy, stress-induced rashes    partial TURP 7/28/21:fragments of low grade and high grade UCC with focal l.p. invasion. Muscle is present and uninvolved. Partial TURP only with detached superficial papillary UCC. BPH and focal chronic inflammation.     We discussed that HGTa bladder canc • Dermatitis due to drugs and medicines taken internally(693.0)    • Diabetes [facility]) Nov. 2014   • Edema    • Elevated blood pressure reading without diagnosis of hypertension    • Esophageal reflux    • Extrinsic asthma, unspecified    • Hearing impairme Brother    • Mental Disorder Brother    • Stroke Brother       Social History: Social History    Tobacco Use      Smoking status: Former Smoker        Packs/day: 1.50        Years: 40.00        Pack years: 60        Types: Cigarettes, Pipe, Cigars        S continuous glucose monitor must be removed by the patient.; Standing  -     Accucheck; Standing  -     Notify Anesthesiologist (specify); Standing  -     Notify Anesthesiologist (specify); Standing  -     Notify Anesthesiologist (specify);  Standing  -

## 2021-09-23 NOTE — OPERATIVE REPORT
Urology Operative Note    Attending Surgeon: Yury Alvarez MD    Patient Name: Evon Squires III    Date of Surgery: 9/23/2021     Preoperative Diagnosis: bladder cancer    Postoperative Diagnosis: same    Procedure Performed: Cystoscopy, re-stagin chemotherapy will be drained 30 minutes from instillation and the patient will remove the flores catheter in a couple days. The patient will follow up with me in 7-10 days to review pathology.     Denilson Bernard MD  Date: 9/23/2021  Time: 3:01 PM

## 2021-09-23 NOTE — ANESTHESIA PROCEDURE NOTES
Airway  Date/Time: 9/23/2021 2:10 PM  Urgency: elective    Airway not difficult    General Information and Staff    Patient location during procedure: OR  Anesthesiologist: Sima Souza MD  Performed: anesthesiologist     Indications and Patient C

## 2021-09-23 NOTE — ANESTHESIA POSTPROCEDURE EVALUATION
[address] [facility],[address] III Patient Status:  Hospital Outpatient Surgery   Age/Gender 78year old male MRN [id number]   Location [facility] Attending Luther Meredith MD   Hosp Day # 0 PCP Kayli Robb MD

## 2021-09-28 ENCOUNTER — PATIENT MESSAGE (OUTPATIENT)
Dept: ENDOCRINOLOGY CLINIC | Facility: CLINIC | Age: 76
End: 2021-09-28

## 2021-09-28 ENCOUNTER — OFFICE VISIT (OUTPATIENT)
Dept: INTERNAL MEDICINE CLINIC | Facility: CLINIC | Age: 76
End: 2021-09-28
Payer: MEDICARE

## 2021-09-28 ENCOUNTER — HOSPITAL ENCOUNTER (OUTPATIENT)
Dept: GENERAL RADIOLOGY | Facility: HOSPITAL | Age: 76
Discharge: HOME | End: 2021-09-28
Attending: UROLOGY
Payer: MEDICARE

## 2021-09-28 VITALS
OXYGEN SATURATION: 96 % | HEART RATE: 70 BPM | DIASTOLIC BLOOD PRESSURE: 64 MMHG | WEIGHT: 226 LBS | RESPIRATION RATE: 16 BRPM | HEIGHT: 68 IN | BODY MASS INDEX: 34.25 KG/M2 | TEMPERATURE: 98 F | SYSTOLIC BLOOD PRESSURE: 128 MMHG

## 2021-09-28 DIAGNOSIS — M06.4 INFLAMMATORY POLYARTHRITIS (HCC): ICD-10-CM

## 2021-09-28 DIAGNOSIS — D80.1 HYPOGAMMAGLOBULINEMIA (HCC): ICD-10-CM

## 2021-09-28 DIAGNOSIS — E11.69 DYSLIPIDEMIA ASSOCIATED WITH TYPE 2 DIABETES MELLITUS (HCC): ICD-10-CM

## 2021-09-28 DIAGNOSIS — Z86.718 HISTORY OF DVT IN ADULTHOOD: ICD-10-CM

## 2021-09-28 DIAGNOSIS — Z87.19 HISTORY OF CROHN'S DISEASE: ICD-10-CM

## 2021-09-28 DIAGNOSIS — J44.9 CHRONIC OBSTRUCTIVE PULMONARY DISEASE, UNSPECIFIED COPD TYPE (HCC): Chronic | ICD-10-CM

## 2021-09-28 DIAGNOSIS — R80.9 TYPE 2 DIABETES MELLITUS WITH MICROALBUMINURIA, WITHOUT LONG-TERM CURRENT USE OF INSULIN (HCC): ICD-10-CM

## 2021-09-28 DIAGNOSIS — E78.5 DYSLIPIDEMIA ASSOCIATED WITH TYPE 2 DIABETES MELLITUS (HCC): ICD-10-CM

## 2021-09-28 DIAGNOSIS — C67.9 BLADDER CANCER (HCC): ICD-10-CM

## 2021-09-28 DIAGNOSIS — Z01.818 PRE-OP EVALUATION: ICD-10-CM

## 2021-09-28 DIAGNOSIS — R97.20 ELEVATED PSA: ICD-10-CM

## 2021-09-28 DIAGNOSIS — I10 ESSENTIAL HYPERTENSION: ICD-10-CM

## 2021-09-28 DIAGNOSIS — H25.9 AGE-RELATED CATARACT OF BOTH EYES, UNSPECIFIED AGE-RELATED CATARACT TYPE: Primary | ICD-10-CM

## 2021-09-28 DIAGNOSIS — N40.1 BENIGN PROSTATIC HYPERPLASIA WITH LOWER URINARY TRACT SYMPTOMS, SYMPTOM DETAILS UNSPECIFIED: ICD-10-CM

## 2021-09-28 DIAGNOSIS — E11.29 TYPE 2 DIABETES MELLITUS WITH MICROALBUMINURIA, WITHOUT LONG-TERM CURRENT USE OF INSULIN (HCC): ICD-10-CM

## 2021-09-28 DIAGNOSIS — Z86.711 HISTORY OF PULMONARY EMBOLUS (PE): ICD-10-CM

## 2021-09-28 DIAGNOSIS — C67.9 MALIGNANT NEOPLASM OF URINARY BLADDER, UNSPECIFIED SITE (HCC): ICD-10-CM

## 2021-09-28 DIAGNOSIS — G47.33 OSA TREATED WITH BIPAP: ICD-10-CM

## 2021-09-28 DIAGNOSIS — K21.00 GASTROESOPHAGEAL REFLUX DISEASE WITH ESOPHAGITIS WITHOUT HEMORRHAGE: Chronic | ICD-10-CM

## 2021-09-28 PROCEDURE — 99214 OFFICE O/P EST MOD 30 MIN: CPT | Performed by: NURSE PRACTITIONER

## 2021-09-28 RX ORDER — BLOOD SUGAR DIAGNOSTIC
STRIP MISCELLANEOUS
Qty: 100 EACH | Refills: 3 | Status: SHIPPED | OUTPATIENT
Start: 2021-09-28

## 2021-09-28 NOTE — PROGRESS NOTES
[facility]  PRE OP RISK ASSESSMENT    REASON FOR CONSULT: Pre-op risk assessment for surgical procedure cataract extraction with lens implant  planned for 10/7/21 and 10/21/21 with Dr Perez Barth .     REQUESTING PHYSICIAN: Dr Cristina Doherty: disease ([facility] 4/15/2014   • Dermatitis due to drugs and medicines taken internally(693.0)    • Diabetes [facility]) Nov. 2014   • Edema    • Elevated blood pressure reading without diagnosis of hypertension    • Esophageal reflux    • Extrinsic asthma, unspecifi In Vitro Strip USE TO TEST BLOOD SUGAR ONCE DAILY 100 each 3   • docusate sodium 100 MG Oral Cap Take 1 capsule (100 mg total) by mouth 2 (two) times daily as needed for constipation.  30 capsule 0   • SYMBICORT 80-4.5 MCG/ACT Inhalation Aerosol TAKE 2 PUFF Take 180 mg by mouth daily. • Omeprazole 20 MG Oral Tab EC Take 20 mg by mouth daily. • Cholecalciferol (VITAMIN D3) 1000 UNITS Oral Cap Take 1 tablet by mouth daily. • folic acid (FOLVITE) 710 MCG Oral Tab Take 1,600 mcg by mouth daily. auscultation  CARDIO: RRR   GI: good BS's,no masses, HSM or tenderness  EXTREMITIES: no edema  NEURO: Oriented times three,    LABS:  Recent labs reviewed stable CBC and BMP  EKG: reviewed EKG from July 2021.   Stable     ASSESSMENT AND PLAN:    Age-related call with any questions or concerns. Meds & Refills for this Visit:  Requested Prescriptions      No prescriptions requested or ordered in this encounter       Imaging & Consults:  None    No follow-ups on file.   There are no Patient Instructions on

## 2021-09-28 NOTE — TELEPHONE ENCOUNTER
From: Florence Moreau III  To: HAYDEE Nam  Sent: 9/28/2021 8:57 AM CDT  Subject: Test strips    Please send a script to [facility], [place] for One Touch Ultra test strips.  [facility] says they have requested a refill and it has not been forth coming

## 2021-09-28 NOTE — TELEPHONE ENCOUNTER
Contacted pharmacy, they did not receive prescription sent on 09/03/2021 by Dr Brady De La Cruz office. Per note from HAYDEE Barney, pt can follow up annually due to being well controlled.     Will send script to pharmacy and notify patient to schedule foll

## 2021-09-28 NOTE — TELEPHONE ENCOUNTER
Future Appointments   Date Time Provider Mini Barber   9/28/2021  1:40 PM HAYDEE Gardner [facility]   10/1/2021 11:15 AM Richie Eller MD [facility] 4      red folder

## 2021-10-01 ENCOUNTER — TELEMEDICINE (OUTPATIENT)
Dept: SURGERY | Facility: CLINIC | Age: 76
End: 2021-10-01
Payer: COMMERCIAL

## 2021-10-01 ENCOUNTER — TELEPHONE (OUTPATIENT)
Dept: SURGERY | Facility: CLINIC | Age: 76
End: 2021-10-01

## 2021-10-01 DIAGNOSIS — N13.8 BPH WITH OBSTRUCTION/LOWER URINARY TRACT SYMPTOMS: ICD-10-CM

## 2021-10-01 DIAGNOSIS — C67.8 MALIGNANT NEOPLASM OF OVERLAPPING SITES OF BLADDER (HCC): Primary | ICD-10-CM

## 2021-10-01 DIAGNOSIS — N40.1 BPH WITH OBSTRUCTION/LOWER URINARY TRACT SYMPTOMS: ICD-10-CM

## 2021-10-01 DIAGNOSIS — R97.20 ELEVATED PSA: ICD-10-CM

## 2021-10-01 DIAGNOSIS — N52.9 ERECTILE DYSFUNCTION, UNSPECIFIED ERECTILE DYSFUNCTION TYPE: ICD-10-CM

## 2021-10-01 PROCEDURE — 99024 POSTOP FOLLOW-UP VISIT: CPT | Performed by: UROLOGY

## 2021-10-01 NOTE — TELEPHONE ENCOUNTER
Hi,  Could you please schedule this patient for 6 weeks BCG to start no sooner than 6 weeks from his last TURBT? Also he needs to be scheduled for office cysto with me no sooner than 6 weeks following completion of BCG.  You can remind him that I'd like devin AVILES

## 2021-10-04 NOTE — TELEPHONE ENCOUNTER
I called the pt and explained that the order for BCG has been faxed to the cancer center. I also discussed message from [facility]PH:    'Could you please schedule this patient for 6 weeks BCG to start no sooner than 6 weeks from his last TURBT?  Also he needs to be

## 2021-10-04 NOTE — TELEPHONE ENCOUNTER
BCG order faxed to [facility] with start date of 6 weeks after 9/23/2021 (last TURBT)

## 2021-10-07 ENCOUNTER — TELEPHONE (OUTPATIENT)
Dept: INTERNAL MEDICINE CLINIC | Facility: CLINIC | Age: 76
End: 2021-10-07

## 2021-10-07 NOTE — TELEPHONE ENCOUNTER
Future Appointments   Date Time Provider Mini Elisabeth   11/1/2021  3:00 PM HAYDEE Casey Dr. office to fax us pre op request for cataract surgery on 11/18-patient to call and have them fax to us-original paperwork

## 2021-10-19 ENCOUNTER — TELEPHONE (OUTPATIENT)
Dept: INTERNAL MEDICINE CLINIC | Facility: CLINIC | Age: 76
End: 2021-10-19

## 2021-10-19 NOTE — TELEPHONE ENCOUNTER
Dr. Driscoll Pi office called stating we faxd them pages 5-10 of H&P but they need all pages 1-10 faxed to them at [phone] for patients surgery on Thursday

## 2021-10-20 RX ORDER — LOSARTAN POTASSIUM AND HYDROCHLOROTHIAZIDE 12.5; 1 MG/1; MG/1
TABLET ORAL
Qty: 90 TABLET | Refills: 0 | Status: SHIPPED | OUTPATIENT
Start: 2021-10-20

## 2021-11-01 ENCOUNTER — OFFICE VISIT (OUTPATIENT)
Dept: INTERNAL MEDICINE CLINIC | Facility: CLINIC | Age: 76
End: 2021-11-01
Payer: MEDICARE

## 2021-11-01 VITALS
HEIGHT: 68 IN | TEMPERATURE: 99 F | DIASTOLIC BLOOD PRESSURE: 58 MMHG | BODY MASS INDEX: 34.71 KG/M2 | SYSTOLIC BLOOD PRESSURE: 128 MMHG | WEIGHT: 229 LBS | HEART RATE: 94 BPM

## 2021-11-01 DIAGNOSIS — H25.9 SENILE CATARACT OF RIGHT EYE, UNSPECIFIED AGE-RELATED CATARACT TYPE: Primary | ICD-10-CM

## 2021-11-01 DIAGNOSIS — E11.69 DYSLIPIDEMIA ASSOCIATED WITH TYPE 2 DIABETES MELLITUS (HCC): ICD-10-CM

## 2021-11-01 DIAGNOSIS — M06.4 INFLAMMATORY POLYARTHRITIS (HCC): ICD-10-CM

## 2021-11-01 DIAGNOSIS — R80.9 TYPE 2 DIABETES MELLITUS WITH MICROALBUMINURIA, WITHOUT LONG-TERM CURRENT USE OF INSULIN (HCC): ICD-10-CM

## 2021-11-01 DIAGNOSIS — I10 ESSENTIAL HYPERTENSION: ICD-10-CM

## 2021-11-01 DIAGNOSIS — G47.33 OSA TREATED WITH BIPAP: ICD-10-CM

## 2021-11-01 DIAGNOSIS — C67.9 MALIGNANT NEOPLASM OF URINARY BLADDER, UNSPECIFIED SITE (HCC): ICD-10-CM

## 2021-11-01 DIAGNOSIS — J44.9 CHRONIC OBSTRUCTIVE PULMONARY DISEASE, UNSPECIFIED COPD TYPE (HCC): ICD-10-CM

## 2021-11-01 DIAGNOSIS — E78.5 DYSLIPIDEMIA ASSOCIATED WITH TYPE 2 DIABETES MELLITUS (HCC): ICD-10-CM

## 2021-11-01 DIAGNOSIS — E11.29 TYPE 2 DIABETES MELLITUS WITH MICROALBUMINURIA, WITHOUT LONG-TERM CURRENT USE OF INSULIN (HCC): ICD-10-CM

## 2021-11-01 PROCEDURE — 99214 OFFICE O/P EST MOD 30 MIN: CPT | Performed by: NURSE PRACTITIONER

## 2021-11-01 NOTE — PROGRESS NOTES
[facility]  PRE OP RISK ASSESSMENT    REASON FOR CONSULT: Pre-op risk assessment for surgical procedure right cataract extraction  planned for 11/18/21 with Dr Keturah Nieves .     REQUESTING PHYSICIAN: Dr Kirby Celis: Patient presents with: and medicines taken internally(693.0)    • Diabetes [facility]) Nov. 2014   • Edema    • Elevated blood pressure reading without diagnosis of hypertension    • Esophageal reflux    • Extrinsic asthma, unspecified    • Hearing impairment     tinnitus   • Hemolyti tablet 0   • Glucose Blood (ONETOUCH ULTRA) In Vitro Strip USE TO TEST BLOOD SUGAR ONCE DAILY 100 each 3   • SYMBICORT 80-4.5 MCG/ACT Inhalation Aerosol TAKE 2 PUFFS BY MOUTH TWICE A DAY 30.6 each 1   • AMLODIPINE 10 MG Oral Tab TAKE 1 TABLET BY MOUTH EVER AFFECTED AREA OF BODY AND HANDS TWICE DAILY AVOID FACE, UNDERARMS AND GROIN. Allergies:     Allergies As of Date: 11/01/2021  Allergen                          Noted       Reaction  CEFACLOR                          07/29/2021  RASH  ATOVAQUONE edema  NEURO: Oriented times three,    LABS:  Reviewed recent     ASSESSMENT AND PLAN:    Senile cataract of right eye, unspecified age-related cataract type  (primary encounter diagnosis)  Essential hypertension  Stable  Cont meds.     Type 2 diabetes curly

## 2021-11-02 ENCOUNTER — TELEPHONE (OUTPATIENT)
Dept: SURGERY | Facility: CLINIC | Age: 76
End: 2021-11-02

## 2021-11-02 NOTE — TELEPHONE ENCOUNTER
Received a call from [facility] from the cancer center. Due to the BCG shortage the patient will not be receiving a dose of BCG this week, because there is no more available at this time. Pt has 6 BCG remaining.  [facility]H please advise, thanks

## 2021-11-03 ENCOUNTER — APPOINTMENT (OUTPATIENT)
Dept: HEMATOLOGY/ONCOLOGY | Facility: HOSPITAL | Age: 76
End: 2021-11-03
Attending: UROLOGY
Payer: MEDICARE

## 2021-11-03 NOTE — TELEPHONE ENCOUNTER
I spoke to Kellen Villegas at [facility] and they are working on their stock of BCG. The patient will receive a dose of BCG this week. [facility] was informed.

## 2021-11-04 ENCOUNTER — OFFICE VISIT (OUTPATIENT)
Dept: HEMATOLOGY/ONCOLOGY | Facility: HOSPITAL | Age: 76
End: 2021-11-04
Attending: UROLOGY
Payer: MEDICARE

## 2021-11-04 VITALS
DIASTOLIC BLOOD PRESSURE: 80 MMHG | SYSTOLIC BLOOD PRESSURE: 153 MMHG | WEIGHT: 230 LBS | TEMPERATURE: 98 F | OXYGEN SATURATION: 96 % | HEART RATE: 94 BPM | BODY MASS INDEX: 35 KG/M2 | RESPIRATION RATE: 18 BRPM

## 2021-11-04 DIAGNOSIS — C67.9 MALIGNANT NEOPLASM OF URINARY BLADDER, UNSPECIFIED SITE (HCC): Primary | ICD-10-CM

## 2021-11-04 PROCEDURE — 51720 TREATMENT OF BLADDER LESION: CPT

## 2021-11-04 PROCEDURE — 81001 URINALYSIS AUTO W/SCOPE: CPT

## 2021-11-04 PROCEDURE — 87086 URINE CULTURE/COLONY COUNT: CPT

## 2021-11-04 NOTE — TELEPHONE ENCOUNTER
RN received an update from Carlee Crandall [facility] at [facility] that this patient had his BCG treatment today, 11/4. Carlee Crandall will call office on Monday to update Dr May Del Cid and Dr Bebeto Jimenes.

## 2021-11-04 NOTE — PROGRESS NOTES
Education Record    Learner:  Patient    Disease / Diagnosis:Bladder Cancer    Barriers / Limitations:  None   Comments:    Method:  Discussion   Comments:    General Topics:  Plan of care reviewed   Comments:    Outcome:  Shows understanding   Comments: P

## 2021-11-04 NOTE — TELEPHONE ENCOUNTER
This RN called Angelique Cuevas from the [employer]. He is on the other line. RN left message to call back at direct line.

## 2021-11-11 ENCOUNTER — OFFICE VISIT (OUTPATIENT)
Dept: HEMATOLOGY/ONCOLOGY | Facility: HOSPITAL | Age: 76
End: 2021-11-11
Attending: UROLOGY
Payer: MEDICARE

## 2021-11-11 VITALS
SYSTOLIC BLOOD PRESSURE: 159 MMHG | BODY MASS INDEX: 34 KG/M2 | RESPIRATION RATE: 18 BRPM | HEART RATE: 87 BPM | TEMPERATURE: 98 F | DIASTOLIC BLOOD PRESSURE: 78 MMHG | OXYGEN SATURATION: 95 % | WEIGHT: 225 LBS

## 2021-11-11 DIAGNOSIS — C67.9 MALIGNANT NEOPLASM OF URINARY BLADDER, UNSPECIFIED SITE (HCC): Primary | ICD-10-CM

## 2021-11-11 PROCEDURE — 87086 URINE CULTURE/COLONY COUNT: CPT

## 2021-11-11 PROCEDURE — 51720 TREATMENT OF BLADDER LESION: CPT

## 2021-11-11 PROCEDURE — 81001 URINALYSIS AUTO W/SCOPE: CPT

## 2021-11-17 ENCOUNTER — OFFICE VISIT (OUTPATIENT)
Dept: HEMATOLOGY/ONCOLOGY | Facility: HOSPITAL | Age: 76
End: 2021-11-17
Attending: UROLOGY
Payer: MEDICARE

## 2021-11-17 VITALS
DIASTOLIC BLOOD PRESSURE: 80 MMHG | WEIGHT: 226.81 LBS | OXYGEN SATURATION: 95 % | TEMPERATURE: 97 F | BODY MASS INDEX: 34 KG/M2 | RESPIRATION RATE: 16 BRPM | HEART RATE: 82 BPM | SYSTOLIC BLOOD PRESSURE: 151 MMHG

## 2021-11-17 DIAGNOSIS — C67.9 MALIGNANT NEOPLASM OF URINARY BLADDER, UNSPECIFIED SITE (HCC): Primary | ICD-10-CM

## 2021-11-17 PROCEDURE — 81001 URINALYSIS AUTO W/SCOPE: CPT

## 2021-11-17 PROCEDURE — 87086 URINE CULTURE/COLONY COUNT: CPT

## 2021-11-17 PROCEDURE — 51720 TREATMENT OF BLADDER LESION: CPT

## 2021-11-18 RX ORDER — AMLODIPINE BESYLATE 10 MG/1
TABLET ORAL
Qty: 90 TABLET | Refills: 0 | Status: SHIPPED | OUTPATIENT
Start: 2021-11-18

## 2021-11-23 RX ORDER — AMLODIPINE BESYLATE 5 MG/1
TABLET ORAL
Qty: 90 TABLET | Refills: 0 | OUTPATIENT
Start: 2021-11-23

## 2021-11-24 ENCOUNTER — OFFICE VISIT (OUTPATIENT)
Dept: HEMATOLOGY/ONCOLOGY | Facility: HOSPITAL | Age: 76
End: 2021-11-24
Attending: UROLOGY
Payer: MEDICARE

## 2021-11-24 VITALS
OXYGEN SATURATION: 97 % | SYSTOLIC BLOOD PRESSURE: 147 MMHG | RESPIRATION RATE: 18 BRPM | DIASTOLIC BLOOD PRESSURE: 77 MMHG | HEART RATE: 95 BPM | TEMPERATURE: 98 F | BODY MASS INDEX: 34 KG/M2 | WEIGHT: 226 LBS

## 2021-11-24 DIAGNOSIS — C67.9 MALIGNANT NEOPLASM OF URINARY BLADDER, UNSPECIFIED SITE (HCC): Primary | ICD-10-CM

## 2021-11-24 PROCEDURE — 51720 TREATMENT OF BLADDER LESION: CPT

## 2021-11-24 PROCEDURE — 87086 URINE CULTURE/COLONY COUNT: CPT

## 2021-11-24 PROCEDURE — 81003 URINALYSIS AUTO W/O SCOPE: CPT

## 2021-12-01 ENCOUNTER — OFFICE VISIT (OUTPATIENT)
Dept: ENDOCRINOLOGY CLINIC | Facility: CLINIC | Age: 76
End: 2021-12-01
Payer: MEDICARE

## 2021-12-01 VITALS
WEIGHT: 228 LBS | BODY MASS INDEX: 34.56 KG/M2 | HEART RATE: 77 BPM | OXYGEN SATURATION: 96 % | DIASTOLIC BLOOD PRESSURE: 60 MMHG | SYSTOLIC BLOOD PRESSURE: 122 MMHG | RESPIRATION RATE: 22 BRPM | HEIGHT: 68 IN

## 2021-12-01 DIAGNOSIS — I10 ESSENTIAL HYPERTENSION: ICD-10-CM

## 2021-12-01 DIAGNOSIS — E11.65 TYPE 2 DIABETES MELLITUS WITH HYPERGLYCEMIA, WITHOUT LONG-TERM CURRENT USE OF INSULIN (HCC): Primary | ICD-10-CM

## 2021-12-01 DIAGNOSIS — E78.5 DYSLIPIDEMIA: ICD-10-CM

## 2021-12-01 PROCEDURE — 83036 HEMOGLOBIN GLYCOSYLATED A1C: CPT | Performed by: NURSE PRACTITIONER

## 2021-12-01 PROCEDURE — 99214 OFFICE O/P EST MOD 30 MIN: CPT | Performed by: NURSE PRACTITIONER

## 2021-12-01 NOTE — PATIENT INSTRUCTIONS
We are here to help you manage your diabetes.  Please continue with your primary care physician/provider for your routine health care maintenance     Your A1C: 6.3% (last A1C 6.7%)  This is very good for you   For most people the target for A1C is less than to be seen at least twice per year however if the A1C is above 8% you will be need to be seen more frequently. · Yearly blood work may also required for many medications to insure safe prescribing.  If you are due for labs, you will have 30 days to comple

## 2021-12-01 NOTE — PROGRESS NOTES
Patient presents with:  Diabetes: F/U, pt brought meter      HPI:   Piyush Hodgson is a 78year old male presenting for type 2 DM medication management.    Primary care physician: Carrie Joy MD    In the past 3 m his DM control has remained well controlled as Reactors, and Bactrim    Past Medical History:   Diagnosis Date   • Acute venous embolism and thrombosis of unspecified deep vessels of lower extremity    • Allergic rhinitis 2011   • Arthritis 2015   • Asthma 2011   • BLOOD CLOTS    • Blood disorder    • COLONOSCOPY  2/9/2014    Procedure: COLONOSCOPY;  Surgeon: Josef Troy MD;  Location: [address] [place] [address] ENDOSCOPY   • COLONOSCOPY  Aug. 2014    and EGD   • SKIN SURGERY  2009    tags, moles   • VASECTOMY  1992     Social History    Tobacco Use      Smoking status: (six) hours as needed for Itching. • Albuterol Sulfate HFA (PROAIR HFA) 108 (90 Base) MCG/ACT Inhalation Aero Soln Inhale 2 puffs into the lungs every 4 (four) hours as needed for Wheezing or Shortness of Breath.  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

## 2021-12-01 NOTE — PROGRESS NOTES
Patient presents with:  Diabetes: F/U, pt brought meter    Bobby Richmond is a 78year old male presenting for type 2 DM medication management. Primary care physician: Sae Zamorano MD    In the past 3 m his diabetes control has remained well controlled.   Steve Henley rhinitis 2011   • Arthritis 2015   • Asthma 2011   • BLOOD CLOTS    • Blood disorder    • Cancer [facility])     bladder   • COPD (chronic obstructive pulmonary disease) ([facility] 2012   • Crohn disease ([facility] 4/15/2014   • Dermatitis due to drugs and medicines taken moles   • VASECTOMY  1992     Social History    Tobacco Use      Smoking status: Former Smoker        Packs/day: 1.50        Years: 40.00        Pack years: 60        Types: Cigarettes, Pipe, Cigars        Start date: 1/26/1962        Quit date: 1/3/2008 for Wheezing or Shortness of Breath.  1 Inhaler 3   • IPRATROPIUM BROMIDE 0.06 % Nasal Solution PLACE 2 SPRAYS INTO EACH NOSTRIL TWICE DAILY 3 Bottle 3   • Fluticasone Propionate 50 MCG/ACT Nasal Suspension SPRAY 2 SPRAYS INTO EACH NOSTRIL EVERY DAY 1 [facility]  A1C: 6.3 % (last A1C 6.7%)    Weight: 228  lb (last weight 239 lb )   Diabetes control is improved but still needs ongoing monitoring      Continue Metformin 1000m tab twice daily     Reminded pt on A1C and blood sugar targets (Fasting < 130 and

## 2021-12-02 ENCOUNTER — OFFICE VISIT (OUTPATIENT)
Dept: HEMATOLOGY/ONCOLOGY | Facility: HOSPITAL | Age: 76
End: 2021-12-02
Attending: UROLOGY
Payer: MEDICARE

## 2021-12-02 VITALS
HEART RATE: 83 BPM | RESPIRATION RATE: 18 BRPM | BODY MASS INDEX: 35 KG/M2 | WEIGHT: 228.38 LBS | OXYGEN SATURATION: 94 % | SYSTOLIC BLOOD PRESSURE: 146 MMHG | TEMPERATURE: 98 F | DIASTOLIC BLOOD PRESSURE: 71 MMHG

## 2021-12-02 DIAGNOSIS — C67.9 MALIGNANT NEOPLASM OF URINARY BLADDER, UNSPECIFIED SITE (HCC): Primary | ICD-10-CM

## 2021-12-02 PROCEDURE — 51720 TREATMENT OF BLADDER LESION: CPT

## 2021-12-06 NOTE — CONSULTS
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   • OBESITY    • Otalgia, unspecified    • Other and unspecified hyperlipid Not on file    Tobacco Use      Smoking status: Former Smoker        Packs/day: 1.50        Years: 40.00        Pack years: 61        Types: Cigarettes, Pipe, Cigars        Start date: 1962        Quit date: 1/3/2008        Years since quittin.9 each, Rfl: 3  •  SYMBICORT 80-4.5 MCG/ACT Inhalation Aerosol, TAKE 2 PUFFS BY MOUTH TWICE A DAY, Disp: 30.6 each, Rfl: 1  •  PRAVASTATIN 10 MG Oral Tab, TAKE 1 TABLET BY MOUTH EVERY DAY IN THE EVENING, Disp: 90 tablet, Rfl: 1  •  Sildenafil Citrate 100 MG (Patient not taking: Reported on 12/1/2021), Disp: 30 tablet, Rfl: 0    Review of Systems:    Constitutional: No chills, fevers, malaise, night sweats and weight loss. Eyes: No visual disturbance, irritation and redness.   Ears, nose, mouth, throat, and f infections, will consider IVIG. # Iron deficiency: Mild. Recommend OTC and repeat labs next visit. Jcarlos Mahajan M.D.     [facility]    [address], [address]    12/7/2021

## 2021-12-07 ENCOUNTER — OFFICE VISIT (OUTPATIENT)
Dept: HEMATOLOGY/ONCOLOGY | Facility: HOSPITAL | Age: 76
End: 2021-12-07
Attending: UROLOGY
Payer: MEDICARE

## 2021-12-07 VITALS
DIASTOLIC BLOOD PRESSURE: 75 MMHG | SYSTOLIC BLOOD PRESSURE: 164 MMHG | WEIGHT: 230 LBS | OXYGEN SATURATION: 97 % | BODY MASS INDEX: 35 KG/M2 | RESPIRATION RATE: 18 BRPM | TEMPERATURE: 98 F | HEART RATE: 92 BPM

## 2021-12-07 DIAGNOSIS — D59.10 AUTOIMMUNE HEMOLYTIC ANEMIA (HCC): ICD-10-CM

## 2021-12-07 DIAGNOSIS — C67.9 MALIGNANT NEOPLASM OF URINARY BLADDER, UNSPECIFIED SITE (HCC): Primary | ICD-10-CM

## 2021-12-07 DIAGNOSIS — D50.9 IRON DEFICIENCY ANEMIA, UNSPECIFIED IRON DEFICIENCY ANEMIA TYPE: ICD-10-CM

## 2021-12-07 PROCEDURE — 99203 OFFICE O/P NEW LOW 30 MIN: CPT | Performed by: INTERNAL MEDICINE

## 2021-12-09 ENCOUNTER — OFFICE VISIT (OUTPATIENT)
Dept: HEMATOLOGY/ONCOLOGY | Facility: HOSPITAL | Age: 76
End: 2021-12-09
Attending: UROLOGY
Payer: MEDICARE

## 2021-12-09 VITALS
OXYGEN SATURATION: 96 % | DIASTOLIC BLOOD PRESSURE: 71 MMHG | HEIGHT: 67.99 IN | SYSTOLIC BLOOD PRESSURE: 145 MMHG | BODY MASS INDEX: 34.56 KG/M2 | TEMPERATURE: 98 F | RESPIRATION RATE: 16 BRPM | HEART RATE: 92 BPM | WEIGHT: 228 LBS

## 2021-12-09 DIAGNOSIS — C67.9 MALIGNANT NEOPLASM OF URINARY BLADDER, UNSPECIFIED SITE (HCC): Primary | ICD-10-CM

## 2021-12-09 PROCEDURE — 87086 URINE CULTURE/COLONY COUNT: CPT

## 2021-12-09 PROCEDURE — 81003 URINALYSIS AUTO W/O SCOPE: CPT

## 2021-12-09 PROCEDURE — 51720 TREATMENT OF BLADDER LESION: CPT

## 2021-12-13 ENCOUNTER — TELEPHONE (OUTPATIENT)
Dept: SURGERY | Facility: CLINIC | Age: 76
End: 2021-12-13

## 2021-12-13 ENCOUNTER — LAB ENCOUNTER (OUTPATIENT)
Dept: LAB | Age: 76
End: 2021-12-13
Attending: UROLOGY
Payer: MEDICARE

## 2021-12-13 DIAGNOSIS — N13.8 BPH WITH OBSTRUCTION/LOWER URINARY TRACT SYMPTOMS: ICD-10-CM

## 2021-12-13 DIAGNOSIS — C67.8 MALIGNANT NEOPLASM OF OVERLAPPING SITES OF BLADDER (HCC): ICD-10-CM

## 2021-12-13 DIAGNOSIS — R97.20 ELEVATED PSA: ICD-10-CM

## 2021-12-13 DIAGNOSIS — N40.1 BPH WITH OBSTRUCTION/LOWER URINARY TRACT SYMPTOMS: ICD-10-CM

## 2021-12-13 DIAGNOSIS — N32.89 BLADDER MASS: ICD-10-CM

## 2021-12-13 PROCEDURE — 36415 COLL VENOUS BLD VENIPUNCTURE: CPT

## 2021-12-13 PROCEDURE — 84153 ASSAY OF PSA TOTAL: CPT

## 2021-12-15 ENCOUNTER — TELEMEDICINE (OUTPATIENT)
Dept: SURGERY | Facility: CLINIC | Age: 76
End: 2021-12-15

## 2021-12-15 ENCOUNTER — TELEPHONE (OUTPATIENT)
Dept: SURGERY | Facility: CLINIC | Age: 76
End: 2021-12-15

## 2021-12-15 DIAGNOSIS — R97.20 ELEVATED PSA: ICD-10-CM

## 2021-12-15 DIAGNOSIS — N13.8 BPH WITH OBSTRUCTION/LOWER URINARY TRACT SYMPTOMS: ICD-10-CM

## 2021-12-15 DIAGNOSIS — N52.9 ERECTILE DYSFUNCTION, UNSPECIFIED ERECTILE DYSFUNCTION TYPE: ICD-10-CM

## 2021-12-15 DIAGNOSIS — C67.8 MALIGNANT NEOPLASM OF OVERLAPPING SITES OF BLADDER (HCC): Primary | ICD-10-CM

## 2021-12-15 DIAGNOSIS — N40.1 BPH WITH OBSTRUCTION/LOWER URINARY TRACT SYMPTOMS: ICD-10-CM

## 2021-12-15 PROCEDURE — 99214 OFFICE O/P EST MOD 30 MIN: CPT | Performed by: UROLOGY

## 2021-12-15 NOTE — TELEPHONE ENCOUNTER
Hi,    Could you please call this patient or their family and schedule the patient for cysto with ELISABETHA SS with me in February/March.     Thanks,    [facility]

## 2021-12-15 NOTE — PROGRESS NOTES
Virtual Video Check-In    Nadiya Jones III verbally consents to a [employer] service on 12/15/21. This is a telemedicine visit with live, interactive video and audio.    Patient understands and accepts financial responsibility for and high grade UCC with focal l.p. invasion. Muscle is present and uninvolved. Partial TURP only with detached superficial papillary UCC. BPH and focal chronic inflammation.     Has 10% potency for masturbation.  He has tried viagra once but stopped as his [employer].         HISTORY:  Past Medical History:   Diagnosis Date   • Acute venous embolism and thrombosis of unspecified deep vessels of lower extremity    • Allergic rhinitis 2011   • Arthritis 2015   • Asthma 2011   • Bladder cancer ([facility]    • BLOOD CLOT COLONOSCOPY      x 2 at age 50 and 56   • COLONOSCOPY  2/9/2014    Procedure: COLONOSCOPY;  Surgeon: Sheri Bernstein MD;  Location: [address] [place] [address] ENDOSCOPY   • COLONOSCOPY  Aug. 2014    and EGD   • SKIN SURGERY  2009    tags, moles   • [address] on 12/1/2021) 30 tablet 0   • metFORMIN HCl 1000 MG Oral Tab Take 1,000 mg by mouth 2 (two) times daily with meals. • Acetaminophen (ACETAMINOPHEN EXTRA STRENGTH) 500 MG Oral Cap Take 1,000 mg by mouth as needed.      • diphenhydrAMINE 50 MG Oral Cap Ta bryant    : as noted above     ASSESSMENT/PLAN:   Diagnoses and all orders for this visit:    Malignant neoplasm of overlapping sites of bladder (HCC)    Elevated PSA    BPH with obstruction/lower urinary tract symptoms    Erectile dysfunction, unspecifi

## 2021-12-16 NOTE — TELEPHONE ENCOUNTER
Phoned patient, scheduled patient for in office cystoscopy with YOANDY ISAAC on Wednesday, February 9, 2022.

## 2022-02-09 ENCOUNTER — PROCEDURE (OUTPATIENT)
Dept: SURGERY | Facility: CLINIC | Age: 77
End: 2022-02-09
Payer: MEDICARE

## 2022-02-09 ENCOUNTER — TELEPHONE (OUTPATIENT)
Dept: SURGERY | Facility: CLINIC | Age: 77
End: 2022-02-09

## 2022-02-09 DIAGNOSIS — N40.1 BPH WITH OBSTRUCTION/LOWER URINARY TRACT SYMPTOMS: ICD-10-CM

## 2022-02-09 DIAGNOSIS — N52.9 ERECTILE DYSFUNCTION, UNSPECIFIED ERECTILE DYSFUNCTION TYPE: ICD-10-CM

## 2022-02-09 DIAGNOSIS — R97.20 ELEVATED PSA: ICD-10-CM

## 2022-02-09 DIAGNOSIS — C67.8 MALIGNANT NEOPLASM OF OVERLAPPING SITES OF BLADDER (HCC): Primary | ICD-10-CM

## 2022-02-09 DIAGNOSIS — N13.8 BPH WITH OBSTRUCTION/LOWER URINARY TRACT SYMPTOMS: ICD-10-CM

## 2022-02-09 LAB
APPEARANCE: CLEAR
BILIRUBIN: NEGATIVE
GLUCOSE (URINE DIPSTICK): NEGATIVE MG/DL
KETONES (URINE DIPSTICK): NEGATIVE MG/DL
LEUKOCYTES: NEGATIVE
MULTISTIX LOT#: NORMAL NUMERIC
NITRITE, URINE: NEGATIVE
OCCULT BLOOD: NEGATIVE
PH, URINE: 6 (ref 4.5–8)
PROTEIN (URINE DIPSTICK): NEGATIVE MG/DL
SPECIFIC GRAVITY: 1.01 (ref 1–1.03)
URINE-COLOR: YELLOW
UROBILINOGEN,SEMI-QN: 0.2 MG/DL (ref 0–1.9)

## 2022-02-09 PROCEDURE — 81003 URINALYSIS AUTO W/O SCOPE: CPT | Performed by: UROLOGY

## 2022-02-09 PROCEDURE — 52000 CYSTOURETHROSCOPY: CPT | Performed by: UROLOGY

## 2022-02-09 PROCEDURE — 99214 OFFICE O/P EST MOD 30 MIN: CPT | Performed by: UROLOGY

## 2022-02-09 RX ORDER — CIPROFLOXACIN 500 MG/1
500 TABLET, FILM COATED ORAL ONCE
Status: COMPLETED | OUTPATIENT
Start: 2022-02-09 | End: 2022-02-09

## 2022-02-09 RX ADMIN — CIPROFLOXACIN 500 MG: 500 TABLET, FILM COATED ORAL at 13:16:00

## 2022-02-09 NOTE — TELEPHONE ENCOUNTER
Called patient this date and he relates he just walked in the door. He needs to have a CT scan first.  He will call today to schedule that and then give me a call back directly at [phone] to schedule.

## 2022-02-09 NOTE — TELEPHONE ENCOUNTER
Ric Meredith,  Could you please schedule this patient for surgery? Thank you! Yael Sanchez    Urology Surgery Request  Surgeon: Marychuy Hairston (if known): EDW  Procedure: cysto, possible right retrograde pyelogram, TURBT with intravesical gemcitabine   Anesthesia: General   Time Frame: pt preference  Time required: 45 minutes  Diagnosis: bladder cancer    Antibiotics: per hospital protocol unless checked below   _x_ Levaquin 500 mg IV   _x_ Gemcitabine 2 g/100 mL NS bladder instillation to be given in OR    Estimated Post Op/Follow Up Appt: ~ 10 days for Video visit post-op appointment - 15 min OK. Please schedule appointment at time of surgery scheduling.

## 2022-02-10 ENCOUNTER — PATIENT MESSAGE (OUTPATIENT)
Dept: SURGERY | Facility: CLINIC | Age: 77
End: 2022-02-10

## 2022-02-10 NOTE — TELEPHONE ENCOUNTER
RN received a call back from [place] [place]. She will call the patient re: COVID test.     RN reached out to patient via [employer] for update.

## 2022-02-10 NOTE — TELEPHONE ENCOUNTER
From: Sebastian Meza III  To: Israel Palmer MD  Sent: 2/10/2022 3:03 PM CST  Subject: Prescription for Covid test    I am required to have a Covid test three days prior to the bladder surgery on Mar 3. I would like to have the Covid test done at [facility]. Please fax the order to [phone].   Thank you

## 2022-02-10 NOTE — TELEPHONE ENCOUNTER
Patient called back this date and scheduled procedure for 3/3/22 at 11:15 am at [facility].  Reviewed pre-op instructions with patient this date and also sent thru My Chart.   Patient will contact me directly with questions at [phone]

## 2022-02-10 NOTE — TELEPHONE ENCOUNTER
RN replied to patient via Mario Grady. * Note, RN called Pre_Admission Testing. No answer. Left message.

## 2022-02-17 RX ORDER — PRAVASTATIN SODIUM 10 MG
TABLET ORAL
Qty: 90 TABLET | Refills: 1 | Status: SHIPPED | OUTPATIENT
Start: 2022-02-17

## 2022-02-21 ENCOUNTER — PATIENT MESSAGE (OUTPATIENT)
Dept: SURGERY | Facility: CLINIC | Age: 77
End: 2022-02-21

## 2022-02-21 ENCOUNTER — HOSPITAL ENCOUNTER (OUTPATIENT)
Dept: CT IMAGING | Age: 77
Discharge: HOME | End: 2022-02-21
Attending: UROLOGY
Payer: MEDICARE

## 2022-02-21 DIAGNOSIS — C67.8 MALIGNANT NEOPLASM OF OVERLAPPING SITES OF BLADDER (HCC): ICD-10-CM

## 2022-02-21 LAB — CREAT BLD-MCNC: 0.9 MG/DL

## 2022-02-21 PROCEDURE — 82565 ASSAY OF CREATININE: CPT

## 2022-02-21 PROCEDURE — 74178 CT ABD&PLV WO CNTR FLWD CNTR: CPT | Performed by: UROLOGY

## 2022-02-21 PROCEDURE — 76377 3D RENDER W/INTRP POSTPROCES: CPT | Performed by: UROLOGY

## 2022-02-21 RX ORDER — IOHEXOL 350 MG/ML
100 INJECTION, SOLUTION INTRAVENOUS
Status: COMPLETED | OUTPATIENT
Start: 2022-02-21 | End: 2022-02-21

## 2022-02-21 RX ADMIN — IOHEXOL 100 ML: 350 INJECTION, SOLUTION INTRAVENOUS at 11:15:00

## 2022-02-22 ENCOUNTER — PATIENT MESSAGE (OUTPATIENT)
Dept: SURGERY | Facility: CLINIC | Age: 77
End: 2022-02-22

## 2022-02-22 ENCOUNTER — TELEPHONE (OUTPATIENT)
Dept: SURGERY | Facility: CLINIC | Age: 77
End: 2022-02-22

## 2022-02-22 RX ORDER — METHYLPREDNISOLONE 4 MG/1
TABLET ORAL
Qty: 21 TABLET | Refills: 0 | Status: SHIPPED | OUTPATIENT
Start: 2022-02-22 | End: 2022-02-28 | Stop reason: ALTCHOICE

## 2022-02-22 NOTE — TELEPHONE ENCOUNTER
Spoke to patient. Developed rash with IV contrast yesterday. Taking benadryl and seems to be improving. He was confused because CT was negative but didn't understand that this does not mean he no longer has bladder tumor. Most recent cysto shows new bladder tumor and he is amenable to proceeding with TURBT next week. Will call back if rash doesn't continue to improve.

## 2022-02-22 NOTE — TELEPHONE ENCOUNTER
Scheduled 3/3/22 for TURBT. From: Rito Bermudez III  To: Rosemary Rios MD  Sent: 2/21/2022  4:48 PM CST  Subject: Surgery still scheduled ? Maybe I'm not seeing something here but it says nothing in the bladder. Is it still necessary to have the surgery?   Jasmin Hernandez

## 2022-02-22 NOTE — TELEPHONE ENCOUNTER
I called the pt and discussed that a medrol pack was sent to his pharmacy and he can use it if the itchiness continues. [facility] #[id number] - [place], [place] - [id number] [phone]. Pt verbalized understanding and all questions were answered.

## 2022-02-22 NOTE — TELEPHONE ENCOUNTER
\"----- Message from Josef Chery III sent at 2/22/2022 12:29 PM CST -----  Regarding: Rash  I had the CT w/contrast yesterday about 11:00am. About 5:00pm I started having symptoms and by 8:00pm I was taking 25mg Benedryl every 3-4 hours. I went to bed early but had a tough night as the itch woke me frequently. My body is a very pretty color dark red unlike last time wear it was a bright red. I had prednisone 20mg tablets so I took one of them this morning and it seems to be winning. I think we can eliminate that particular contrast from my repertoire. Gregory\"    I called the pt. He stated that he still has redness and itchiness, but it has improved. Pt denies SOB, hives, and itchiness of throat. I added contrast dyes to his allergy list. MLD please advise if you have further recommendations.

## 2022-02-22 NOTE — TELEPHONE ENCOUNTER
From: Lizette Akbar III  To: Victoria Flowers MD  Sent: 2/22/2022 12:30 PM CST  Subject: Covid test scheduled    [facility] in [place] will administer my Covid test on Monday 2-28.

## 2022-02-22 NOTE — TELEPHONE ENCOUNTER
From: Hank Gamez III  To: Rich Lake MD  Sent: 2/22/2022 12:29 PM CST  Subject: Rash    I had the CT w/contrast yesterday about 11:00am. About 5:00pm I started having symptoms and by 8:00pm I was taking 25mg Benedryl every 3-4 hours. I went to bed early but had a tough night as the itch woke me frequently. My body is a very pretty color dark red unlike last time wear it was a bright red. I had prednisone 20mg tablets so I took one of them this morning and it seems to be winning. I think we can eliminate that particular contrast from my repertoire.    Pratima Found

## 2022-02-22 NOTE — TELEPHONE ENCOUNTER
----- Message from Devora Laureano III sent at 2/22/2022 12:29 PM CST -----  Regarding: Rash  I had the CT w/contrast yesterday about 11:00am. About 5:00pm I started having symptoms and by 8:00pm I was taking 25mg Benedryl every 3-4 hours. I went to bed early but had a tough night as the itch woke me frequently. My body is a very pretty color dark red unlike last time wear it was a bright red. I had prednisone 20mg tablets so I took one of them this morning and it seems to be winning. I think we can eliminate that particular contrast from my repertoire.    Madeline Parks

## 2022-03-03 ENCOUNTER — HOSPITAL ENCOUNTER (OUTPATIENT)
Facility: HOSPITAL | Age: 77
Setting detail: HOSPITAL OUTPATIENT SURGERY
Discharge: HOME | End: 2022-03-03
Attending: UROLOGY | Admitting: UROLOGY
Payer: MEDICARE

## 2022-03-03 ENCOUNTER — ANESTHESIA EVENT (OUTPATIENT)
Dept: SURGERY | Facility: HOSPITAL | Age: 77
End: 2022-03-03
Payer: MEDICARE

## 2022-03-03 ENCOUNTER — APPOINTMENT (OUTPATIENT)
Dept: GENERAL RADIOLOGY | Facility: HOSPITAL | Age: 77
End: 2022-03-03
Attending: UROLOGY
Payer: MEDICARE

## 2022-03-03 ENCOUNTER — ANESTHESIA (OUTPATIENT)
Dept: SURGERY | Facility: HOSPITAL | Age: 77
End: 2022-03-03
Payer: MEDICARE

## 2022-03-03 VITALS
SYSTOLIC BLOOD PRESSURE: 133 MMHG | RESPIRATION RATE: 16 BRPM | DIASTOLIC BLOOD PRESSURE: 54 MMHG | WEIGHT: 226.19 LBS | HEIGHT: 68 IN | BODY MASS INDEX: 34.28 KG/M2 | TEMPERATURE: 97 F | OXYGEN SATURATION: 97 % | HEART RATE: 71 BPM

## 2022-03-03 DIAGNOSIS — C67.9 MALIGNANT NEOPLASM OF URINARY BLADDER, UNSPECIFIED SITE (HCC): ICD-10-CM

## 2022-03-03 LAB
GLUCOSE BLD-MCNC: 150 MG/DL (ref 70–99)
GLUCOSE BLD-MCNC: 174 MG/DL (ref 70–99)

## 2022-03-03 PROCEDURE — 0TBB8ZX EXCISION OF BLADDER, VIA NATURAL OR ARTIFICIAL OPENING ENDOSCOPIC, DIAGNOSTIC: ICD-10-PCS | Performed by: UROLOGY

## 2022-03-03 PROCEDURE — 93010 ELECTROCARDIOGRAM REPORT: CPT | Performed by: INTERNAL MEDICINE

## 2022-03-03 PROCEDURE — 82962 GLUCOSE BLOOD TEST: CPT

## 2022-03-03 PROCEDURE — 0VB08ZZ EXCISION OF PROSTATE, VIA NATURAL OR ARTIFICIAL OPENING ENDOSCOPIC: ICD-10-PCS | Performed by: UROLOGY

## 2022-03-03 PROCEDURE — 93005 ELECTROCARDIOGRAM TRACING: CPT

## 2022-03-03 PROCEDURE — 88305 TISSUE EXAM BY PATHOLOGIST: CPT | Performed by: UROLOGY

## 2022-03-03 RX ORDER — HYDROCODONE BITARTRATE AND ACETAMINOPHEN 5; 325 MG/1; MG/1
2 TABLET ORAL AS NEEDED
Status: DISCONTINUED | OUTPATIENT
Start: 2022-03-03 | End: 2022-03-03

## 2022-03-03 RX ORDER — LIDOCAINE HYDROCHLORIDE 20 MG/ML
JELLY TOPICAL AS NEEDED
Status: DISCONTINUED | OUTPATIENT
Start: 2022-03-03 | End: 2022-03-03 | Stop reason: HOSPADM

## 2022-03-03 RX ORDER — ONDANSETRON 2 MG/ML
INJECTION INTRAMUSCULAR; INTRAVENOUS AS NEEDED
Status: DISCONTINUED | OUTPATIENT
Start: 2022-03-03 | End: 2022-03-03 | Stop reason: SURG

## 2022-03-03 RX ORDER — AMLODIPINE BESYLATE 10 MG/1
TABLET ORAL
Qty: 90 TABLET | Refills: 0 | Status: SHIPPED | OUTPATIENT
Start: 2022-03-03

## 2022-03-03 RX ORDER — LEVOFLOXACIN 5 MG/ML
INJECTION, SOLUTION INTRAVENOUS
Status: DISCONTINUED
Start: 2022-03-03 | End: 2022-03-03

## 2022-03-03 RX ORDER — LEVOFLOXACIN 5 MG/ML
500 INJECTION, SOLUTION INTRAVENOUS ONCE
Status: COMPLETED | OUTPATIENT
Start: 2022-03-03 | End: 2022-03-03

## 2022-03-03 RX ORDER — NICOTINE POLACRILEX 4 MG
30 LOZENGE BUCCAL
Status: DISCONTINUED | OUTPATIENT
Start: 2022-03-03 | End: 2022-03-03 | Stop reason: HOSPADM

## 2022-03-03 RX ORDER — DEXAMETHASONE SODIUM PHOSPHATE 4 MG/ML
VIAL (ML) INJECTION AS NEEDED
Status: DISCONTINUED | OUTPATIENT
Start: 2022-03-03 | End: 2022-03-03 | Stop reason: SURG

## 2022-03-03 RX ORDER — MIDAZOLAM HYDROCHLORIDE 1 MG/ML
INJECTION INTRAMUSCULAR; INTRAVENOUS AS NEEDED
Status: DISCONTINUED | OUTPATIENT
Start: 2022-03-03 | End: 2022-03-03 | Stop reason: SURG

## 2022-03-03 RX ORDER — LIDOCAINE HYDROCHLORIDE 10 MG/ML
INJECTION, SOLUTION EPIDURAL; INFILTRATION; INTRACAUDAL; PERINEURAL AS NEEDED
Status: DISCONTINUED | OUTPATIENT
Start: 2022-03-03 | End: 2022-03-03 | Stop reason: SURG

## 2022-03-03 RX ORDER — HYDROCODONE BITARTRATE AND ACETAMINOPHEN 5; 325 MG/1; MG/1
1 TABLET ORAL AS NEEDED
Status: DISCONTINUED | OUTPATIENT
Start: 2022-03-03 | End: 2022-03-03

## 2022-03-03 RX ORDER — NICOTINE POLACRILEX 4 MG
15 LOZENGE BUCCAL
Status: DISCONTINUED | OUTPATIENT
Start: 2022-03-03 | End: 2022-03-03 | Stop reason: HOSPADM

## 2022-03-03 RX ORDER — DOCUSATE SODIUM 100 MG/1
100 CAPSULE, LIQUID FILLED ORAL 2 TIMES DAILY PRN
Qty: 30 CAPSULE | Refills: 0 | Status: SHIPPED | OUTPATIENT
Start: 2022-03-03

## 2022-03-03 RX ORDER — ACETAMINOPHEN 500 MG
1000 TABLET ORAL ONCE
Status: DISCONTINUED | OUTPATIENT
Start: 2022-03-03 | End: 2022-03-03 | Stop reason: HOSPADM

## 2022-03-03 RX ORDER — HYDROCODONE BITARTRATE AND ACETAMINOPHEN 5; 325 MG/1; MG/1
1 TABLET ORAL EVERY 6 HOURS PRN
Qty: 30 TABLET | Refills: 0 | Status: SHIPPED | OUTPATIENT
Start: 2022-03-03

## 2022-03-03 RX ORDER — SODIUM CHLORIDE, SODIUM LACTATE, POTASSIUM CHLORIDE, CALCIUM CHLORIDE 600; 310; 30; 20 MG/100ML; MG/100ML; MG/100ML; MG/100ML
INJECTION, SOLUTION INTRAVENOUS CONTINUOUS
Status: DISCONTINUED | OUTPATIENT
Start: 2022-03-03 | End: 2022-03-03

## 2022-03-03 RX ORDER — DEXTROSE MONOHYDRATE 25 G/50ML
50 INJECTION, SOLUTION INTRAVENOUS
Status: DISCONTINUED | OUTPATIENT
Start: 2022-03-03 | End: 2022-03-03 | Stop reason: HOSPADM

## 2022-03-03 RX ORDER — CIPROFLOXACIN 500 MG/1
500 TABLET, FILM COATED ORAL 2 TIMES DAILY
Qty: 8 TABLET | Refills: 0 | Status: SHIPPED | OUTPATIENT
Start: 2022-03-03 | End: 2022-03-07

## 2022-03-03 RX ORDER — NALOXONE HYDROCHLORIDE 0.4 MG/ML
80 INJECTION, SOLUTION INTRAMUSCULAR; INTRAVENOUS; SUBCUTANEOUS AS NEEDED
Status: DISCONTINUED | OUTPATIENT
Start: 2022-03-03 | End: 2022-03-03

## 2022-03-03 RX ADMIN — DEXAMETHASONE SODIUM PHOSPHATE 4 MG: 4 MG/ML VIAL (ML) INJECTION at 12:36:00

## 2022-03-03 RX ADMIN — SODIUM CHLORIDE, SODIUM LACTATE, POTASSIUM CHLORIDE, CALCIUM CHLORIDE: 600; 310; 30; 20 INJECTION, SOLUTION INTRAVENOUS at 13:19:00

## 2022-03-03 RX ADMIN — MIDAZOLAM HYDROCHLORIDE 2 MG: 1 INJECTION INTRAMUSCULAR; INTRAVENOUS at 12:26:00

## 2022-03-03 RX ADMIN — ONDANSETRON 4 MG: 2 INJECTION INTRAMUSCULAR; INTRAVENOUS at 13:00:00

## 2022-03-03 RX ADMIN — LIDOCAINE HYDROCHLORIDE 25 MG: 10 INJECTION, SOLUTION EPIDURAL; INFILTRATION; INTRACAUDAL; PERINEURAL at 12:30:00

## 2022-03-03 RX ADMIN — SODIUM CHLORIDE, SODIUM LACTATE, POTASSIUM CHLORIDE, CALCIUM CHLORIDE: 600; 310; 30; 20 INJECTION, SOLUTION INTRAVENOUS at 13:14:00

## 2022-03-03 RX ADMIN — LEVOFLOXACIN 500 MG: 5 INJECTION, SOLUTION INTRAVENOUS at 12:30:00

## 2022-03-03 NOTE — INTERVAL H&P NOTE
Pre-op Diagnosis: Malignant neoplasm of urinary bladder, unspecified site [facility]) [C67.9]    The above referenced H&P was reviewed by Chris Shirley MD on 3/3/2022, the patient was examined and no significant changes have occurred in the patient's condition since the H&P was performed. I discussed with the patient and/or legal representative the potential benefits, risks and side effects of this procedure; the likelihood of the patient achieving goals; and potential problems that might occur during recuperation. I discussed reasonable alternatives to the procedure, including risks, benefits and side effects related to the alternatives and risks related to not receiving this procedure. We will proceed with procedure as planned.

## 2022-03-03 NOTE — ANESTHESIA PROCEDURE NOTES
Airway  Date/Time: 3/3/2022 12:31 PM  Urgency: elective      General Information and Staff    Patient location during procedure: OR  Resident/CRNA: Rafiq Trejo CRNA  Performed: CRNA     Indications and Patient Condition  Indications for airway management: anesthesia  Spontaneous Ventilation: absent  Preoxygenated: yes  Patient position: sniffing  Mask difficulty assessment: 0 - not attempted    Final Airway Details  Final airway type: supraglottic airway      Successful airway: Size 4      Number of attempts at approach: 1

## 2022-03-03 NOTE — OPERATIVE REPORT
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 Surveillance cysto showed a new tumor on posterior bladder wall which was ~ 1 cm in size. The patient was counseled on options and elected to undergo the aforementioned procedure. We discussed the risks and benefits to surgery. We discussed risks including, but not limited to, bleeding, infection, possible damage to surrounding structures, possible need for temporary flores catheter following the procedure. We also discussed the risks and benefits to intravesical instillation of gemcitabine. The patient understood these risks and wished to proceed with surgery. Description of the procedure: The patient was taken to the operating room and prepped and draped in lithotomy position after undergoing general anesthesia. The cystoscope was inserted. He was noted to have a bladder neck contracture and we were unable to pass the cystoscope beyond this area. I performed a partial TURP which allowed us to enter the bladder. The bladder tumor burden was easily identified. We then proceeded with transurethral resection of bladder tumor. All visible tumor burden was completely resected. All oozing blood vessels were identified and fulgurated. No residual tumor or bleeding was noted at the end of the case. The bladder was drained and the scope was removed. I then inserted a 20F flores catheter and instilled intravesical gemcitabine. The flores was clamped for 30 minutes.   The bladder tumor specimens were sent to pathology. The patient was awoken having tolerated the procedure well. Specimen: bladder tumor    Complications: No known complications    Condition on Discharge from the operating room was stable    Plan: The chemotherapy will be drained 30 minutes from instillation and the flores catheter removed at home tomorrow. The patient will follow up with me in 7-10 days to review pathology.     Tyshawn Marie MD  Date: 3/3/2022  Time: 1:11 PM

## 2022-03-07 ENCOUNTER — PATIENT MESSAGE (OUTPATIENT)
Dept: SURGERY | Facility: CLINIC | Age: 77
End: 2022-03-07

## 2022-03-08 LAB
ATRIAL RATE: 70 BPM
P AXIS: 31 DEGREES
P-R INTERVAL: 138 MS
Q-T INTERVAL: 402 MS
QRS DURATION: 122 MS
QTC CALCULATION (BEZET): 434 MS
R AXIS: -14 DEGREES
T AXIS: 14 DEGREES
VENTRICULAR RATE: 70 BPM

## 2022-03-09 NOTE — TELEPHONE ENCOUNTER
From: Te Marshall III  To: Leesa Velarde MD  Sent: 3/7/2022 11:36 AM CST  Subject: Insurance information    hugo is telling me there is no insurance information on file. Where did that information go? It has been there all along and now it is not there?

## 2022-03-11 ENCOUNTER — TELEMEDICINE (OUTPATIENT)
Dept: SURGERY | Facility: CLINIC | Age: 77
End: 2022-03-11
Payer: MEDICARE

## 2022-03-11 ENCOUNTER — TELEPHONE (OUTPATIENT)
Dept: SURGERY | Facility: CLINIC | Age: 77
End: 2022-03-11

## 2022-03-11 DIAGNOSIS — R97.20 ELEVATED PSA: ICD-10-CM

## 2022-03-11 DIAGNOSIS — N13.8 BPH WITH OBSTRUCTION/LOWER URINARY TRACT SYMPTOMS: ICD-10-CM

## 2022-03-11 DIAGNOSIS — N52.9 ERECTILE DYSFUNCTION, UNSPECIFIED ERECTILE DYSFUNCTION TYPE: ICD-10-CM

## 2022-03-11 DIAGNOSIS — C67.9 MALIGNANT NEOPLASM OF URINARY BLADDER, UNSPECIFIED SITE (HCC): Primary | ICD-10-CM

## 2022-03-11 DIAGNOSIS — N40.1 BPH WITH OBSTRUCTION/LOWER URINARY TRACT SYMPTOMS: ICD-10-CM

## 2022-03-11 PROCEDURE — 99214 OFFICE O/P EST MOD 30 MIN: CPT | Performed by: UROLOGY

## 2022-03-11 RX ORDER — TADALAFIL 20 MG/1
20 TABLET ORAL
Qty: 30 TABLET | Refills: 5 | Status: SHIPPED | OUTPATIENT
Start: 2022-03-11

## 2022-03-11 NOTE — TELEPHONE ENCOUNTER
Please call this patient or their family and schedule the patient for a cysto appt with cytology in 4 mo with YOANDY ISAAC?     Thanks,    MPH

## 2022-03-15 ENCOUNTER — TELEPHONE (OUTPATIENT)
Dept: INTERNAL MEDICINE CLINIC | Facility: CLINIC | Age: 77
End: 2022-03-15

## 2022-03-29 NOTE — TELEPHONE ENCOUNTER
Per SD form needed to be filled out by patients pulmonologist. I did fax back already to them that information, but we keep getting faxes. Contacted patient and provided total home health number for patient to call and advise them of who form needs to go to.

## 2022-04-15 RX ORDER — LOSARTAN POTASSIUM AND HYDROCHLOROTHIAZIDE 12.5; 1 MG/1; MG/1
TABLET ORAL
Qty: 90 TABLET | Refills: 0 | Status: SHIPPED | OUTPATIENT
Start: 2022-04-15

## 2022-04-15 NOTE — TELEPHONE ENCOUNTER
PASSED per protocol, refill sent.     Last PE:  9-9-2021-sd  Future Appointments   Date Time Provider Mini Barber   7/20/2022 11:30 AM Cirilo Shah MD [facility]

## 2022-05-31 RX ORDER — AMLODIPINE BESYLATE 10 MG/1
TABLET ORAL
Qty: 90 TABLET | Refills: 0 | Status: SHIPPED | OUTPATIENT
Start: 2022-05-31

## 2022-06-07 ENCOUNTER — TELEPHONE (OUTPATIENT)
Dept: INTERNAL MEDICINE CLINIC | Facility: CLINIC | Age: 77
End: 2022-06-07

## 2022-06-07 ENCOUNTER — LAB ENCOUNTER (OUTPATIENT)
Dept: LAB | Age: 77
End: 2022-06-07
Attending: UROLOGY
Payer: MEDICARE

## 2022-06-07 DIAGNOSIS — E61.1 IRON DEFICIENCY: ICD-10-CM

## 2022-06-07 PROBLEM — D50.9 IRON DEFICIENCY ANEMIA: Status: ACTIVE | Noted: 2022-06-07

## 2022-06-07 LAB
BASOPHILS # BLD AUTO: 0.07 X10(3) UL (ref 0–0.2)
BASOPHILS NFR BLD AUTO: 0.9 %
DEPRECATED HBV CORE AB SER IA-ACNC: 73 NG/ML
EOSINOPHIL # BLD AUTO: 0.17 X10(3) UL (ref 0–0.7)
EOSINOPHIL NFR BLD AUTO: 2.2 %
ERYTHROCYTE [DISTWIDTH] IN BLOOD BY AUTOMATED COUNT: 14.2 %
HCT VFR BLD AUTO: 37.9 %
HGB BLD-MCNC: 12.2 G/DL
IMM GRANULOCYTES # BLD AUTO: 0.07 X10(3) UL (ref 0–1)
IMM GRANULOCYTES NFR BLD: 0.9 %
IRON SATN MFR SERPL: 17 %
IRON SERPL-MCNC: 58 UG/DL
LYMPHOCYTES # BLD AUTO: 3.18 X10(3) UL (ref 1–4)
LYMPHOCYTES NFR BLD AUTO: 41.1 %
MCH RBC QN AUTO: 30 PG (ref 26–34)
MCHC RBC AUTO-ENTMCNC: 32.2 G/DL (ref 31–37)
MCV RBC AUTO: 93.1 FL
MONOCYTES # BLD AUTO: 0.72 X10(3) UL (ref 0.1–1)
MONOCYTES NFR BLD AUTO: 9.3 %
NEUTROPHILS # BLD AUTO: 3.52 X10 (3) UL (ref 1.5–7.7)
NEUTROPHILS # BLD AUTO: 3.52 X10(3) UL (ref 1.5–7.7)
NEUTROPHILS NFR BLD AUTO: 45.6 %
PLATELET # BLD AUTO: 271 10(3)UL (ref 150–450)
RBC # BLD AUTO: 4.07 X10(6)UL
TIBC SERPL-MCNC: 337 UG/DL (ref 240–450)
TRANSFERRIN SERPL-MCNC: 226 MG/DL (ref 200–360)
WBC # BLD AUTO: 7.7 X10(3) UL (ref 4–11)

## 2022-06-07 PROCEDURE — 36415 COLL VENOUS BLD VENIPUNCTURE: CPT

## 2022-06-07 PROCEDURE — 83540 ASSAY OF IRON: CPT

## 2022-06-07 PROCEDURE — 83550 IRON BINDING TEST: CPT

## 2022-06-07 PROCEDURE — 82728 ASSAY OF FERRITIN: CPT

## 2022-06-07 PROCEDURE — 85025 COMPLETE CBC W/AUTO DIFF WBC: CPT

## 2022-06-08 ENCOUNTER — TELEPHONE (OUTPATIENT)
Dept: HEMATOLOGY/ONCOLOGY | Facility: HOSPITAL | Age: 77
End: 2022-06-08

## 2022-06-22 RX ORDER — LOSARTAN POTASSIUM AND HYDROCHLOROTHIAZIDE 12.5; 1 MG/1; MG/1
TABLET ORAL
Qty: 90 TABLET | Refills: 0 | Status: SHIPPED | OUTPATIENT
Start: 2022-06-22

## 2022-06-22 NOTE — TELEPHONE ENCOUNTER
Last OV: 11/1/21 pre-op  Last PE: 9/9/21 well adult    Future Appointments   Date Time Provider Mini Arauzi   7/20/2022 11:30 AM Annette Gonzalez MD [facility]        Latest labs: 6/7/22 iron and tibc, ferritin and CBC    Latest RX: losartan- 90 tabs 0 refills on 4/15/22    Per protocol, failed due to Appointment in past 6 or next 3 months. Rx pending.

## 2022-07-01 ENCOUNTER — TELEPHONE (OUTPATIENT)
Dept: INTERNAL MEDICINE CLINIC | Facility: CLINIC | Age: 77
End: 2022-07-01

## 2022-07-01 NOTE — TELEPHONE ENCOUNTER
Received consultation notes from SUSIE Le and [address] [place] [address], Dr Olga Fisher. Large Joint Injection/ Arthrocentesis bilateral knee on 6/29/22. Papers sent to scan.

## 2022-07-09 ENCOUNTER — TELEPHONE (OUTPATIENT)
Dept: ENDOCRINOLOGY CLINIC | Facility: CLINIC | Age: 77
End: 2022-07-09

## 2022-07-09 DIAGNOSIS — R80.9 TYPE 2 DIABETES MELLITUS WITH MICROALBUMINURIA, WITHOUT LONG-TERM CURRENT USE OF INSULIN (HCC): Primary | ICD-10-CM

## 2022-07-09 DIAGNOSIS — E11.29 TYPE 2 DIABETES MELLITUS WITH MICROALBUMINURIA, WITHOUT LONG-TERM CURRENT USE OF INSULIN (HCC): Primary | ICD-10-CM

## 2022-07-11 NOTE — TELEPHONE ENCOUNTER
Overdue for lab and follow up with myself and Gracia Balbuena. Will enter labs. Please call to schedule.

## 2022-07-11 NOTE — TELEPHONE ENCOUNTER
Last OV: 11/1/21 pre-op  Last PE: 9/9/21    Future Appointments   Date Time Provider Mini Arauzi   7/20/2022 11:30 AM Bay Haynes MD [facility]        Latest labs: 12/1/21 A1c 6/7/22 ferritin and cbc    Latest RX: metformin- 180 tabs 1 refill on 12/30/21    Per protocol, failed due to below. Rx pending.    HgBA1C procedure resulted in past 6 months    Last HgBA1C < 7.5    Microalbumin procedure in past 12 months or taking ACE/ARB    Appointment in past 6 or next 3 months

## 2022-07-14 NOTE — TELEPHONE ENCOUNTER
Future Appointments   Date Time Provider Mini Elisabeth   8/10/2022  2:00 PM HAYDEE Kramer [facility]        Please place lab orders per SD note.

## 2022-07-20 ENCOUNTER — PROCEDURE (OUTPATIENT)
Dept: SURGERY | Facility: CLINIC | Age: 77
End: 2022-07-20
Payer: MEDICARE

## 2022-07-20 VITALS — SYSTOLIC BLOOD PRESSURE: 128 MMHG | DIASTOLIC BLOOD PRESSURE: 79 MMHG | HEART RATE: 76 BPM

## 2022-07-20 DIAGNOSIS — N40.1 BPH WITH OBSTRUCTION/LOWER URINARY TRACT SYMPTOMS: ICD-10-CM

## 2022-07-20 DIAGNOSIS — N52.9 ERECTILE DYSFUNCTION, UNSPECIFIED ERECTILE DYSFUNCTION TYPE: ICD-10-CM

## 2022-07-20 DIAGNOSIS — N13.8 BPH WITH OBSTRUCTION/LOWER URINARY TRACT SYMPTOMS: ICD-10-CM

## 2022-07-20 DIAGNOSIS — C67.9 MALIGNANT NEOPLASM OF URINARY BLADDER, UNSPECIFIED SITE (HCC): Primary | ICD-10-CM

## 2022-07-20 DIAGNOSIS — R97.20 ELEVATED PSA: ICD-10-CM

## 2022-07-20 LAB
APPEARANCE: CLEAR
BILIRUBIN: NEGATIVE
GLUCOSE (URINE DIPSTICK): NEGATIVE MG/DL
KETONES (URINE DIPSTICK): NEGATIVE MG/DL
LEUKOCYTES: NEGATIVE
MULTISTIX LOT#: NORMAL NUMERIC
NITRITE, URINE: NEGATIVE
OCCULT BLOOD: NEGATIVE
PH, URINE: 5.5 (ref 4.5–8)
PROTEIN (URINE DIPSTICK): NEGATIVE MG/DL
SPECIFIC GRAVITY: 1.01 (ref 1–1.03)
URINE-COLOR: YELLOW
UROBILINOGEN,SEMI-QN: 0.2 MG/DL (ref 0–1.9)

## 2022-07-20 PROCEDURE — 99214 OFFICE O/P EST MOD 30 MIN: CPT | Performed by: UROLOGY

## 2022-07-20 PROCEDURE — 52000 CYSTOURETHROSCOPY: CPT | Performed by: UROLOGY

## 2022-07-20 PROCEDURE — 81003 URINALYSIS AUTO W/O SCOPE: CPT | Performed by: UROLOGY

## 2022-07-20 RX ORDER — CIPROFLOXACIN 500 MG/1
500 TABLET, FILM COATED ORAL ONCE
Status: SHIPPED | OUTPATIENT
Start: 2022-07-20

## 2022-07-21 LAB — NON GYNE INTERPRETATION: NEGATIVE

## 2022-07-28 ENCOUNTER — TELEPHONE (OUTPATIENT)
Dept: INTERNAL MEDICINE CLINIC | Facility: CLINIC | Age: 77
End: 2022-07-28

## 2022-07-28 NOTE — TELEPHONE ENCOUNTER
Future Appointments   Date Time Provider Mini Elisabeth   8/10/2022  2:00 PM HAYDEE Denise [facility]     Pt sched for surgery on 8/30 w/Dr. Iram Villarreal faxed our form and placed in brown folder

## 2022-08-01 ENCOUNTER — LAB ENCOUNTER (OUTPATIENT)
Dept: LAB | Age: 77
End: 2022-08-01
Attending: UROLOGY
Payer: MEDICARE

## 2022-08-01 DIAGNOSIS — R80.9 TYPE 2 DIABETES MELLITUS WITH MICROALBUMINURIA, WITHOUT LONG-TERM CURRENT USE OF INSULIN (HCC): ICD-10-CM

## 2022-08-01 DIAGNOSIS — R97.20 ELEVATED PSA: ICD-10-CM

## 2022-08-01 DIAGNOSIS — E11.29 TYPE 2 DIABETES MELLITUS WITH MICROALBUMINURIA, WITHOUT LONG-TERM CURRENT USE OF INSULIN (HCC): ICD-10-CM

## 2022-08-01 LAB
ALBUMIN SERPL-MCNC: 4.2 G/DL (ref 3.4–5)
ALBUMIN/GLOB SERPL: 1.4 {RATIO} (ref 1–2)
ALP LIVER SERPL-CCNC: 47 U/L
ALT SERPL-CCNC: 35 U/L
ANION GAP SERPL CALC-SCNC: 10 MMOL/L (ref 0–18)
AST SERPL-CCNC: 19 U/L (ref 15–37)
BILIRUB SERPL-MCNC: 0.6 MG/DL (ref 0.1–2)
BUN BLD-MCNC: 21 MG/DL (ref 7–18)
CALCIUM BLD-MCNC: 9.6 MG/DL (ref 8.5–10.1)
CHLORIDE SERPL-SCNC: 102 MMOL/L (ref 98–112)
CHOLEST SERPL-MCNC: 206 MG/DL (ref ?–200)
CO2 SERPL-SCNC: 25 MMOL/L (ref 21–32)
CREAT BLD-MCNC: 1.18 MG/DL
CREAT UR-SCNC: 81.5 MG/DL
EST. AVERAGE GLUCOSE BLD GHB EST-MCNC: 163 MG/DL (ref 68–126)
FASTING PATIENT LIPID ANSWER: YES
FASTING STATUS PATIENT QL REPORTED: YES
GLOBULIN PLAS-MCNC: 3.1 G/DL (ref 2.8–4.4)
GLUCOSE BLD-MCNC: 147 MG/DL (ref 70–99)
HBA1C MFR BLD: 7.3 % (ref ?–5.7)
HDLC SERPL-MCNC: 38 MG/DL (ref 40–59)
LDLC SERPL CALC-MCNC: 115 MG/DL (ref ?–100)
MICROALBUMIN UR-MCNC: 1.58 MG/DL
MICROALBUMIN/CREAT 24H UR-RTO: 19.4 UG/MG (ref ?–30)
NONHDLC SERPL-MCNC: 168 MG/DL (ref ?–130)
OSMOLALITY SERPL CALC.SUM OF ELEC: 290 MOSM/KG (ref 275–295)
POTASSIUM SERPL-SCNC: 4 MMOL/L (ref 3.5–5.1)
PROT SERPL-MCNC: 7.3 G/DL (ref 6.4–8.2)
PSA SERPL-MCNC: 4.02 NG/ML (ref ?–4)
SODIUM SERPL-SCNC: 137 MMOL/L (ref 136–145)
TRIGL SERPL-MCNC: 306 MG/DL (ref 30–149)
VLDLC SERPL CALC-MCNC: 54 MG/DL (ref 0–30)

## 2022-08-01 PROCEDURE — 82043 UR ALBUMIN QUANTITATIVE: CPT

## 2022-08-01 PROCEDURE — 80061 LIPID PANEL: CPT

## 2022-08-01 PROCEDURE — 82570 ASSAY OF URINE CREATININE: CPT

## 2022-08-01 PROCEDURE — 83036 HEMOGLOBIN GLYCOSYLATED A1C: CPT

## 2022-08-01 PROCEDURE — 84153 ASSAY OF PSA TOTAL: CPT

## 2022-08-01 PROCEDURE — 36415 COLL VENOUS BLD VENIPUNCTURE: CPT

## 2022-08-01 PROCEDURE — 80053 COMPREHEN METABOLIC PANEL: CPT

## 2022-08-09 NOTE — TELEPHONE ENCOUNTER
I called Dr Nicole Soto and spoke with Adilene Boswell explained to her we have faxed our pre op form to their office multiple times and that we need it back prior to pt's appt tomorrow. She stated she was putting a message and will have them fax it to us.

## 2022-08-10 ENCOUNTER — OFFICE VISIT (OUTPATIENT)
Dept: INTERNAL MEDICINE CLINIC | Facility: CLINIC | Age: 77
End: 2022-08-10
Payer: MEDICARE

## 2022-08-10 VITALS
HEART RATE: 80 BPM | TEMPERATURE: 99 F | HEIGHT: 68 IN | DIASTOLIC BLOOD PRESSURE: 56 MMHG | OXYGEN SATURATION: 98 % | SYSTOLIC BLOOD PRESSURE: 122 MMHG | BODY MASS INDEX: 34.86 KG/M2 | WEIGHT: 230 LBS

## 2022-08-10 DIAGNOSIS — J44.9 CHRONIC OBSTRUCTIVE PULMONARY DISEASE, UNSPECIFIED COPD TYPE (HCC): Chronic | ICD-10-CM

## 2022-08-10 DIAGNOSIS — G47.33 OSA TREATED WITH BIPAP: ICD-10-CM

## 2022-08-10 DIAGNOSIS — Z86.718 HISTORY OF DVT IN ADULTHOOD: ICD-10-CM

## 2022-08-10 DIAGNOSIS — M17.12 PRIMARY OSTEOARTHRITIS OF LEFT KNEE: Primary | ICD-10-CM

## 2022-08-10 DIAGNOSIS — D80.1 HYPOGAMMAGLOBULINEMIA (HCC): ICD-10-CM

## 2022-08-10 DIAGNOSIS — K21.00 GASTROESOPHAGEAL REFLUX DISEASE WITH ESOPHAGITIS WITHOUT HEMORRHAGE: Chronic | ICD-10-CM

## 2022-08-10 DIAGNOSIS — E78.5 DYSLIPIDEMIA ASSOCIATED WITH TYPE 2 DIABETES MELLITUS (HCC): ICD-10-CM

## 2022-08-10 DIAGNOSIS — Z87.19 HISTORY OF CROHN'S DISEASE: ICD-10-CM

## 2022-08-10 DIAGNOSIS — R94.31 ABNORMAL EKG: ICD-10-CM

## 2022-08-10 DIAGNOSIS — I10 ESSENTIAL HYPERTENSION: ICD-10-CM

## 2022-08-10 DIAGNOSIS — C67.9 MALIGNANT NEOPLASM OF URINARY BLADDER, UNSPECIFIED SITE (HCC): ICD-10-CM

## 2022-08-10 DIAGNOSIS — Z86.2 HISTORY OF HEMOLYTIC ANEMIA: ICD-10-CM

## 2022-08-10 DIAGNOSIS — E11.69 DYSLIPIDEMIA ASSOCIATED WITH TYPE 2 DIABETES MELLITUS (HCC): ICD-10-CM

## 2022-08-10 DIAGNOSIS — Z86.711 HISTORY OF PULMONARY EMBOLUS (PE): ICD-10-CM

## 2022-08-10 DIAGNOSIS — N40.1 BENIGN PROSTATIC HYPERPLASIA WITH LOWER URINARY TRACT SYMPTOMS, SYMPTOM DETAILS UNSPECIFIED: ICD-10-CM

## 2022-08-10 DIAGNOSIS — M06.4 INFLAMMATORY POLYARTHRITIS (HCC): ICD-10-CM

## 2022-08-10 DIAGNOSIS — E11.29 TYPE 2 DIABETES MELLITUS WITH MICROALBUMINURIA, WITHOUT LONG-TERM CURRENT USE OF INSULIN (HCC): ICD-10-CM

## 2022-08-10 DIAGNOSIS — R80.9 TYPE 2 DIABETES MELLITUS WITH MICROALBUMINURIA, WITHOUT LONG-TERM CURRENT USE OF INSULIN (HCC): ICD-10-CM

## 2022-08-10 PROCEDURE — 93000 ELECTROCARDIOGRAM COMPLETE: CPT | Performed by: NURSE PRACTITIONER

## 2022-08-10 PROCEDURE — 99214 OFFICE O/P EST MOD 30 MIN: CPT | Performed by: NURSE PRACTITIONER

## 2022-08-12 ENCOUNTER — PATIENT MESSAGE (OUTPATIENT)
Dept: INTERNAL MEDICINE CLINIC | Facility: CLINIC | Age: 77
End: 2022-08-12

## 2022-08-15 NOTE — TELEPHONE ENCOUNTER
Pt had EKG done at [facility] on 8/10/22. Pt asking if the spelling of his last name can be corrected. MA's- do not believe this can be done?

## 2022-08-15 NOTE — TELEPHONE ENCOUNTER
From: Lynn Show III  To: HAYDEE Juarez  Sent: 8/12/2022 8:36 AM CDT  Subject: Spelling of last name    My last name is incorrect on the ECG: 'B-r-y-n-e' should be 'B-y-r-n-e'.

## 2022-08-17 ENCOUNTER — OFFICE VISIT (OUTPATIENT)
Dept: ENDOCRINOLOGY CLINIC | Facility: CLINIC | Age: 77
End: 2022-08-17
Payer: MEDICARE

## 2022-08-17 VITALS
SYSTOLIC BLOOD PRESSURE: 118 MMHG | HEART RATE: 79 BPM | DIASTOLIC BLOOD PRESSURE: 68 MMHG | HEIGHT: 68 IN | BODY MASS INDEX: 34.71 KG/M2 | WEIGHT: 229 LBS | RESPIRATION RATE: 15 BRPM | OXYGEN SATURATION: 96 %

## 2022-08-17 DIAGNOSIS — E11.65 TYPE 2 DIABETES MELLITUS WITH HYPERGLYCEMIA, WITHOUT LONG-TERM CURRENT USE OF INSULIN (HCC): Primary | ICD-10-CM

## 2022-08-17 DIAGNOSIS — E78.5 DYSLIPIDEMIA: ICD-10-CM

## 2022-08-17 DIAGNOSIS — I10 ESSENTIAL HYPERTENSION: ICD-10-CM

## 2022-08-17 PROCEDURE — 99214 OFFICE O/P EST MOD 30 MIN: CPT | Performed by: NURSE PRACTITIONER

## 2022-08-17 RX ORDER — PRAVASTATIN SODIUM 10 MG
TABLET ORAL
Qty: 90 TABLET | Refills: 1 | Status: SHIPPED | OUTPATIENT
Start: 2022-08-17

## 2022-08-24 ENCOUNTER — PATIENT MESSAGE (OUTPATIENT)
Dept: INTERNAL MEDICINE CLINIC | Facility: CLINIC | Age: 77
End: 2022-08-24

## 2022-08-24 DIAGNOSIS — E11.69 DYSLIPIDEMIA ASSOCIATED WITH TYPE 2 DIABETES MELLITUS (HCC): ICD-10-CM

## 2022-08-24 DIAGNOSIS — M17.12 PRIMARY OSTEOARTHRITIS OF LEFT KNEE: ICD-10-CM

## 2022-08-24 DIAGNOSIS — I10 ESSENTIAL HYPERTENSION: ICD-10-CM

## 2022-08-24 DIAGNOSIS — E78.5 DYSLIPIDEMIA ASSOCIATED WITH TYPE 2 DIABETES MELLITUS (HCC): ICD-10-CM

## 2022-08-24 DIAGNOSIS — E11.29 TYPE 2 DIABETES MELLITUS WITH MICROALBUMINURIA, WITHOUT LONG-TERM CURRENT USE OF INSULIN (HCC): ICD-10-CM

## 2022-08-24 DIAGNOSIS — R94.31 ABNORMAL EKG: Primary | ICD-10-CM

## 2022-08-24 DIAGNOSIS — R80.9 TYPE 2 DIABETES MELLITUS WITH MICROALBUMINURIA, WITHOUT LONG-TERM CURRENT USE OF INSULIN (HCC): ICD-10-CM

## 2022-08-24 NOTE — TELEPHONE ENCOUNTER
From: Preston Gonzalez III  To: Flora Seip, APRN  Sent: 8/24/2022 5:41 AM CDT  Subject: Pre surgery clearance    Hi Estephaniaelmer Sumneron, as you might remember, I am scheduled for knee replacement surgery in 6 days. It will be performed in [address] [place] [address] in [place]. I am having the [facility] tomorrow, Aug 25 (it was not done earlier due to my confusion with [employer] - a story for another time). Sara from [facility] requires a copy of my pre-surgery clearance. That will complete the required testing for the aforementioned pre surgery clearance. Will you have enough time to evaluate the Lexiscan, add it to your report, and get a copy to her by close of business Friday (2 days)? Or should I just reschedule the knee replacement?

## 2022-08-24 NOTE — TELEPHONE ENCOUNTER
Please advise on patient message.     Leanna Dejesus scheduled for 8/25/22  Pre op appointment was 8/10/22

## 2022-08-25 ENCOUNTER — HOSPITAL ENCOUNTER (OUTPATIENT)
Dept: CV DIAGNOSTICS | Age: 77
Discharge: HOME | End: 2022-08-25
Attending: NURSE PRACTITIONER
Payer: MEDICARE

## 2022-08-25 DIAGNOSIS — M17.12 PRIMARY OSTEOARTHRITIS OF LEFT KNEE: ICD-10-CM

## 2022-08-25 DIAGNOSIS — E78.5 DYSLIPIDEMIA ASSOCIATED WITH TYPE 2 DIABETES MELLITUS (HCC): ICD-10-CM

## 2022-08-25 DIAGNOSIS — E11.69 DYSLIPIDEMIA ASSOCIATED WITH TYPE 2 DIABETES MELLITUS (HCC): ICD-10-CM

## 2022-08-25 DIAGNOSIS — R94.31 ABNORMAL EKG: ICD-10-CM

## 2022-08-25 DIAGNOSIS — R80.9 TYPE 2 DIABETES MELLITUS WITH MICROALBUMINURIA, WITHOUT LONG-TERM CURRENT USE OF INSULIN (HCC): ICD-10-CM

## 2022-08-25 DIAGNOSIS — I10 ESSENTIAL HYPERTENSION: ICD-10-CM

## 2022-08-25 DIAGNOSIS — E11.29 TYPE 2 DIABETES MELLITUS WITH MICROALBUMINURIA, WITHOUT LONG-TERM CURRENT USE OF INSULIN (HCC): ICD-10-CM

## 2022-08-25 PROCEDURE — 93018 CV STRESS TEST I&R ONLY: CPT | Performed by: NURSE PRACTITIONER

## 2022-08-25 PROCEDURE — 78452 HT MUSCLE IMAGE SPECT MULT: CPT | Performed by: NURSE PRACTITIONER

## 2022-08-25 PROCEDURE — 93017 CV STRESS TEST TRACING ONLY: CPT | Performed by: NURSE PRACTITIONER

## 2022-08-26 ENCOUNTER — TELEPHONE (OUTPATIENT)
Dept: INTERNAL MEDICINE CLINIC | Facility: CLINIC | Age: 77
End: 2022-08-26

## 2022-08-26 NOTE — TELEPHONE ENCOUNTER
lexiscan completed and preliminary results reviewed. Not able to provide clearance to ortho until Lexiscan is finalized. Spoke to Dr Phyllis Christie office. Will likely not be able to have this completed by end of day Friday but will be able to provide this on Monday. Call back number provided.

## 2022-08-26 NOTE — TELEPHONE ENCOUNTER
Brianne Hinkler from [address] [place] [address] requesting H&P signed by an MD. Also needs stress and EKG faxed to her at [phone]. Pt scheduled for surgery on Tuesday 8/30.

## 2022-08-26 NOTE — TELEPHONE ENCOUNTER
Called to get update regarding stress results. Called [facility] locations. Provided back line to give update.

## 2022-08-29 ENCOUNTER — TELEPHONE (OUTPATIENT)
Dept: INTERNAL MEDICINE CLINIC | Facility: CLINIC | Age: 77
End: 2022-08-29

## 2022-08-29 NOTE — TELEPHONE ENCOUNTER
Faxed all to [phone]. MD did not sign progress note yet, but if they still needed we are waiting for A.S. to sign.

## 2022-08-29 NOTE — TELEPHONE ENCOUNTER
Gaby Peaks states they need signed H & P, nuclear stress test results and EKG faxed to [phone].  Surgery is tomorrow

## 2022-08-29 NOTE — TELEPHONE ENCOUNTER
Monique Barry at Dr Imelda Sommers office indicates she will check all the paperwork and call back if needed(backline given). Justice carbajal.

## 2022-08-30 RX ORDER — AMLODIPINE BESYLATE 10 MG/1
TABLET ORAL
Qty: 90 TABLET | Refills: 0 | Status: SHIPPED | OUTPATIENT
Start: 2022-08-30

## 2022-09-03 NOTE — TELEPHONE ENCOUNTER
Received fax from [facility] with attahced diabetic eye exam. Abstracted results and placed in AS bin to review.
Sent to scan.
Unknown if ever smoked

## 2022-09-07 ENCOUNTER — TELEPHONE (OUTPATIENT)
Dept: INTERNAL MEDICINE CLINIC | Facility: CLINIC | Age: 77
End: 2022-09-07

## 2022-09-07 NOTE — TELEPHONE ENCOUNTER
Spoke to Amber. States patient's heart rate has been elevated the past couple times she has been to see him this past week, ranging from . No related symptoms. Denies cp, sob, fever, dehydration, dizziness/lightheadedness, or palpitations. She goes back again tomorrow. Meanwhile, he has been monitoring and recording twice daily. States she was calling as FYI. Advised if becomes symptomatic, IC/ED. Verbs understanding. Sending to on call also, in case other advised.

## 2022-09-07 NOTE — TELEPHONE ENCOUNTER
Ruthellen Scheuermann stated pt is heart rate has been on the higher side, stable but getting up to . Ruthellen Scheuermann wants to give an FYI as pt has an upcoming appt.     Future Appointments   Date Time Provider Mini Barber   9/21/2022  1:00 PM HAYDEE Kramer [facility]

## 2022-09-15 ENCOUNTER — OFFICE VISIT (OUTPATIENT)
Dept: INTERNAL MEDICINE CLINIC | Facility: CLINIC | Age: 77
End: 2022-09-15
Payer: MEDICARE

## 2022-09-15 VITALS
WEIGHT: 220.19 LBS | OXYGEN SATURATION: 97 % | SYSTOLIC BLOOD PRESSURE: 118 MMHG | RESPIRATION RATE: 20 BRPM | DIASTOLIC BLOOD PRESSURE: 62 MMHG | HEIGHT: 68 IN | HEART RATE: 84 BPM | TEMPERATURE: 99 F | BODY MASS INDEX: 33.37 KG/M2

## 2022-09-15 DIAGNOSIS — E11.29 TYPE 2 DIABETES MELLITUS WITH MICROALBUMINURIA, WITHOUT LONG-TERM CURRENT USE OF INSULIN (HCC): ICD-10-CM

## 2022-09-15 DIAGNOSIS — E78.5 DYSLIPIDEMIA ASSOCIATED WITH TYPE 2 DIABETES MELLITUS (HCC): ICD-10-CM

## 2022-09-15 DIAGNOSIS — I10 ESSENTIAL HYPERTENSION: ICD-10-CM

## 2022-09-15 DIAGNOSIS — Z86.718 HISTORY OF DVT IN ADULTHOOD: ICD-10-CM

## 2022-09-15 DIAGNOSIS — J44.9 CHRONIC OBSTRUCTIVE PULMONARY DISEASE, UNSPECIFIED COPD TYPE (HCC): ICD-10-CM

## 2022-09-15 DIAGNOSIS — R80.9 TYPE 2 DIABETES MELLITUS WITH MICROALBUMINURIA, WITHOUT LONG-TERM CURRENT USE OF INSULIN (HCC): ICD-10-CM

## 2022-09-15 DIAGNOSIS — G47.33 OSA TREATED WITH BIPAP: ICD-10-CM

## 2022-09-15 DIAGNOSIS — E11.69 DYSLIPIDEMIA ASSOCIATED WITH TYPE 2 DIABETES MELLITUS (HCC): ICD-10-CM

## 2022-09-15 DIAGNOSIS — Z86.711 HISTORY OF PULMONARY EMBOLUS (PE): ICD-10-CM

## 2022-09-15 DIAGNOSIS — Z96.652 S/P TKR (TOTAL KNEE REPLACEMENT), LEFT: Primary | ICD-10-CM

## 2022-09-15 PROCEDURE — 99214 OFFICE O/P EST MOD 30 MIN: CPT | Performed by: NURSE PRACTITIONER

## 2022-09-15 RX ORDER — ASPIRIN 81 MG/1
81 TABLET ORAL 2 TIMES DAILY
COMMUNITY

## 2022-09-27 ENCOUNTER — LAB ENCOUNTER (OUTPATIENT)
Dept: LAB | Age: 77
End: 2022-09-27
Attending: INTERNAL MEDICINE
Payer: MEDICARE

## 2022-09-27 DIAGNOSIS — C67.9 MALIGNANT NEOPLASM OF URINARY BLADDER, UNSPECIFIED SITE (HCC): ICD-10-CM

## 2022-09-27 DIAGNOSIS — D59.10 AUTOIMMUNE HEMOLYTIC ANEMIA (HCC): ICD-10-CM

## 2022-09-27 DIAGNOSIS — D50.9 IRON DEFICIENCY ANEMIA, UNSPECIFIED IRON DEFICIENCY ANEMIA TYPE: ICD-10-CM

## 2022-09-27 LAB
ALBUMIN SERPL-MCNC: 4.2 G/DL (ref 3.4–5)
ALBUMIN/GLOB SERPL: 1.4 {RATIO} (ref 1–2)
ALP LIVER SERPL-CCNC: 62 U/L
ALT SERPL-CCNC: 27 U/L
ANION GAP SERPL CALC-SCNC: 6 MMOL/L (ref 0–18)
AST SERPL-CCNC: 13 U/L (ref 15–37)
BASOPHILS # BLD AUTO: 0.06 X10(3) UL (ref 0–0.2)
BASOPHILS NFR BLD AUTO: 0.7 %
BILIRUB SERPL-MCNC: 0.5 MG/DL (ref 0.1–2)
BUN BLD-MCNC: 15 MG/DL (ref 7–18)
CALCIUM BLD-MCNC: 9.9 MG/DL (ref 8.5–10.1)
CHLORIDE SERPL-SCNC: 100 MMOL/L (ref 98–112)
CO2 SERPL-SCNC: 26 MMOL/L (ref 21–32)
CREAT BLD-MCNC: 1.05 MG/DL
DAT (C3D): NEGATIVE
DAT (IGG): POSITIVE
DEPRECATED HBV CORE AB SER IA-ACNC: 151 NG/ML
DIRECT COOMBS POLY: POSITIVE
ELUATE RESULT: POSITIVE
EOSINOPHIL # BLD AUTO: 0.13 X10(3) UL (ref 0–0.7)
EOSINOPHIL NFR BLD AUTO: 1.5 %
ERYTHROCYTE [DISTWIDTH] IN BLOOD BY AUTOMATED COUNT: 14.8 %
FASTING STATUS PATIENT QL REPORTED: YES
GFR SERPLBLD BASED ON 1.73 SQ M-ARVRAT: 74 ML/MIN/1.73M2 (ref 60–?)
GLOBULIN PLAS-MCNC: 3 G/DL (ref 2.8–4.4)
GLUCOSE BLD-MCNC: 120 MG/DL (ref 70–99)
HAPTOGLOB SERPL-MCNC: 254 MG/DL (ref 30–200)
HCT VFR BLD AUTO: 37.7 %
HGB BLD-MCNC: 11.9 G/DL
IMM GRANULOCYTES # BLD AUTO: 0.05 X10(3) UL (ref 0–1)
IMM GRANULOCYTES NFR BLD: 0.6 %
IRON SATN MFR SERPL: 13 %
IRON SERPL-MCNC: 51 UG/DL
LDH SERPL L TO P-CCNC: 159 U/L
LYMPHOCYTES # BLD AUTO: 2.66 X10(3) UL (ref 1–4)
LYMPHOCYTES NFR BLD AUTO: 30.1 %
MCH RBC QN AUTO: 29.6 PG (ref 26–34)
MCHC RBC AUTO-ENTMCNC: 31.6 G/DL (ref 31–37)
MCV RBC AUTO: 93.8 FL
MONOCYTES # BLD AUTO: 0.74 X10(3) UL (ref 0.1–1)
MONOCYTES NFR BLD AUTO: 8.4 %
NEUTROPHILS # BLD AUTO: 5.19 X10 (3) UL (ref 1.5–7.7)
NEUTROPHILS # BLD AUTO: 5.19 X10(3) UL (ref 1.5–7.7)
NEUTROPHILS NFR BLD AUTO: 58.7 %
OSMOLALITY SERPL CALC.SUM OF ELEC: 276 MOSM/KG (ref 275–295)
PLATELET # BLD AUTO: 330 10(3)UL (ref 150–450)
POTASSIUM SERPL-SCNC: 4 MMOL/L (ref 3.5–5.1)
PROT SERPL-MCNC: 7.2 G/DL (ref 6.4–8.2)
RBC # BLD AUTO: 4.02 X10(6)UL
SODIUM SERPL-SCNC: 132 MMOL/L (ref 136–145)
TIBC SERPL-MCNC: 378 UG/DL (ref 240–450)
TRANSFERRIN SERPL-MCNC: 254 MG/DL (ref 200–360)
WBC # BLD AUTO: 8.8 X10(3) UL (ref 4–11)

## 2022-09-27 PROCEDURE — 83540 ASSAY OF IRON: CPT

## 2022-09-27 PROCEDURE — 82728 ASSAY OF FERRITIN: CPT

## 2022-09-27 PROCEDURE — 83550 IRON BINDING TEST: CPT

## 2022-09-27 PROCEDURE — 80053 COMPREHEN METABOLIC PANEL: CPT

## 2022-09-27 PROCEDURE — 85025 COMPLETE CBC W/AUTO DIFF WBC: CPT

## 2022-09-27 PROCEDURE — 86880 COOMBS TEST DIRECT: CPT

## 2022-09-27 PROCEDURE — 86860 RBC ANTIBODY ELUTION: CPT

## 2022-09-27 PROCEDURE — 36415 COLL VENOUS BLD VENIPUNCTURE: CPT

## 2022-09-27 PROCEDURE — 83010 ASSAY OF HAPTOGLOBIN QUANT: CPT

## 2022-09-27 PROCEDURE — 83615 LACTATE (LD) (LDH) ENZYME: CPT

## 2022-10-04 ENCOUNTER — OFFICE VISIT (OUTPATIENT)
Dept: INTERNAL MEDICINE CLINIC | Facility: CLINIC | Age: 77
End: 2022-10-04
Payer: MEDICARE

## 2022-10-04 VITALS
HEIGHT: 68 IN | HEART RATE: 88 BPM | WEIGHT: 219 LBS | SYSTOLIC BLOOD PRESSURE: 122 MMHG | BODY MASS INDEX: 33.19 KG/M2 | TEMPERATURE: 97 F | DIASTOLIC BLOOD PRESSURE: 66 MMHG

## 2022-10-04 DIAGNOSIS — C67.9 MALIGNANT NEOPLASM OF URINARY BLADDER, UNSPECIFIED SITE (HCC): ICD-10-CM

## 2022-10-04 DIAGNOSIS — R97.20 ELEVATED PSA: ICD-10-CM

## 2022-10-04 DIAGNOSIS — D80.1 HYPOGAMMAGLOBULINEMIA (HCC): ICD-10-CM

## 2022-10-04 DIAGNOSIS — M06.4 INFLAMMATORY POLYARTHRITIS (HCC): ICD-10-CM

## 2022-10-04 DIAGNOSIS — G47.33 OSA TREATED WITH BIPAP: ICD-10-CM

## 2022-10-04 DIAGNOSIS — Z86.2 HISTORY OF HEMOLYTIC ANEMIA: ICD-10-CM

## 2022-10-04 DIAGNOSIS — N40.0 BPH WITHOUT OBSTRUCTION/LOWER URINARY TRACT SYMPTOMS: ICD-10-CM

## 2022-10-04 DIAGNOSIS — I10 ESSENTIAL HYPERTENSION: ICD-10-CM

## 2022-10-04 DIAGNOSIS — Z96.652 S/P TKR (TOTAL KNEE REPLACEMENT), LEFT: ICD-10-CM

## 2022-10-04 DIAGNOSIS — Z86.718 HISTORY OF DVT IN ADULTHOOD: ICD-10-CM

## 2022-10-04 DIAGNOSIS — K21.00 GASTROESOPHAGEAL REFLUX DISEASE WITH ESOPHAGITIS WITHOUT HEMORRHAGE: ICD-10-CM

## 2022-10-04 DIAGNOSIS — D50.9 IRON DEFICIENCY ANEMIA, UNSPECIFIED IRON DEFICIENCY ANEMIA TYPE: ICD-10-CM

## 2022-10-04 DIAGNOSIS — E78.5 DYSLIPIDEMIA ASSOCIATED WITH TYPE 2 DIABETES MELLITUS (HCC): ICD-10-CM

## 2022-10-04 DIAGNOSIS — Z86.711 HISTORY OF PULMONARY EMBOLUS (PE): ICD-10-CM

## 2022-10-04 DIAGNOSIS — R80.9 TYPE 2 DIABETES MELLITUS WITH MICROALBUMINURIA, WITHOUT LONG-TERM CURRENT USE OF INSULIN (HCC): ICD-10-CM

## 2022-10-04 DIAGNOSIS — J30.1 ALLERGIC RHINITIS DUE TO POLLEN, UNSPECIFIED SEASONALITY: ICD-10-CM

## 2022-10-04 DIAGNOSIS — E11.29 TYPE 2 DIABETES MELLITUS WITH MICROALBUMINURIA, WITHOUT LONG-TERM CURRENT USE OF INSULIN (HCC): ICD-10-CM

## 2022-10-04 DIAGNOSIS — E11.69 DYSLIPIDEMIA ASSOCIATED WITH TYPE 2 DIABETES MELLITUS (HCC): ICD-10-CM

## 2022-10-04 DIAGNOSIS — Z00.00 ENCOUNTER FOR ANNUAL HEALTH EXAMINATION: Primary | ICD-10-CM

## 2022-10-04 DIAGNOSIS — J44.9 CHRONIC OBSTRUCTIVE PULMONARY DISEASE, UNSPECIFIED COPD TYPE (HCC): ICD-10-CM

## 2022-10-04 DIAGNOSIS — E55.9 VITAMIN D DEFICIENCY: ICD-10-CM

## 2022-10-04 PROCEDURE — G0439 PPPS, SUBSEQ VISIT: HCPCS | Performed by: FAMILY MEDICINE

## 2022-10-04 PROCEDURE — 99204 OFFICE O/P NEW MOD 45 MIN: CPT | Performed by: FAMILY MEDICINE

## 2022-10-04 RX ORDER — FAMOTIDINE 20 MG/1
20 TABLET, FILM COATED ORAL DAILY
COMMUNITY

## 2022-10-04 RX ORDER — LOSARTAN POTASSIUM AND HYDROCHLOROTHIAZIDE 12.5; 1 MG/1; MG/1
TABLET ORAL
Qty: 90 TABLET | Refills: 0 | OUTPATIENT
Start: 2022-10-04

## 2022-10-09 DIAGNOSIS — E11.29 TYPE 2 DIABETES MELLITUS WITH MICROALBUMINURIA, WITHOUT LONG-TERM CURRENT USE OF INSULIN (HCC): ICD-10-CM

## 2022-10-09 DIAGNOSIS — R80.9 TYPE 2 DIABETES MELLITUS WITH MICROALBUMINURIA, WITHOUT LONG-TERM CURRENT USE OF INSULIN (HCC): ICD-10-CM

## 2022-10-11 NOTE — TELEPHONE ENCOUNTER
PASSED per protocol, refill sent.   Last PE 10.4.22  Future Appointments   Date Time Provider Mini Barber   11/16/2022 11:30 AM Bay [facility]nes [facility]   3/15/2023  9:45 AM HAYDEE Howell [id number] [place]   4/4/2023 10:20 AM Asha Benitez MD [facility]

## 2022-11-16 ENCOUNTER — PROCEDURE (OUTPATIENT)
Dept: SURGERY | Facility: CLINIC | Age: 77
End: 2022-11-16
Payer: MEDICARE

## 2022-11-16 ENCOUNTER — LAB ENCOUNTER (OUTPATIENT)
Dept: LAB | Facility: HOSPITAL | Age: 77
End: 2022-11-16
Attending: UROLOGY
Payer: MEDICARE

## 2022-11-16 DIAGNOSIS — N40.1 BPH WITH OBSTRUCTION/LOWER URINARY TRACT SYMPTOMS: ICD-10-CM

## 2022-11-16 DIAGNOSIS — N52.9 ERECTILE DYSFUNCTION, UNSPECIFIED ERECTILE DYSFUNCTION TYPE: ICD-10-CM

## 2022-11-16 DIAGNOSIS — N13.8 BPH WITH OBSTRUCTION/LOWER URINARY TRACT SYMPTOMS: ICD-10-CM

## 2022-11-16 DIAGNOSIS — R97.20 ELEVATED PSA: ICD-10-CM

## 2022-11-16 DIAGNOSIS — C67.9 MALIGNANT NEOPLASM OF URINARY BLADDER, UNSPECIFIED SITE (HCC): Primary | ICD-10-CM

## 2022-11-16 LAB
APPEARANCE: CLEAR
BILIRUBIN: NEGATIVE
GLUCOSE (URINE DIPSTICK): NEGATIVE MG/DL
KETONES (URINE DIPSTICK): NEGATIVE MG/DL
LEUKOCYTES: NEGATIVE
MULTISTIX LOT#: NORMAL NUMERIC
NITRITE, URINE: NEGATIVE
OCCULT BLOOD: NEGATIVE
PH, URINE: 5.5 (ref 4.5–8)
PROTEIN (URINE DIPSTICK): NEGATIVE MG/DL
PSA SERPL-MCNC: 3.23 NG/ML (ref ?–4)
SPECIFIC GRAVITY: 1.02 (ref 1–1.03)
URINE-COLOR: YELLOW
UROBILINOGEN,SEMI-QN: 0.2 MG/DL (ref 0–1.9)

## 2022-11-16 PROCEDURE — 84153 ASSAY OF PSA TOTAL: CPT

## 2022-11-16 PROCEDURE — 99214 OFFICE O/P EST MOD 30 MIN: CPT | Performed by: UROLOGY

## 2022-11-16 PROCEDURE — 52000 CYSTOURETHROSCOPY: CPT | Performed by: UROLOGY

## 2022-11-16 PROCEDURE — 81003 URINALYSIS AUTO W/O SCOPE: CPT | Performed by: UROLOGY

## 2022-11-16 PROCEDURE — 36415 COLL VENOUS BLD VENIPUNCTURE: CPT

## 2022-11-16 RX ORDER — CIPROFLOXACIN 500 MG/1
500 TABLET, FILM COATED ORAL ONCE
Status: COMPLETED | OUTPATIENT
Start: 2022-11-16 | End: 2022-11-16

## 2022-11-16 RX ADMIN — CIPROFLOXACIN 500 MG: 500 TABLET, FILM COATED ORAL at 11:49:00

## 2022-11-29 RX ORDER — AMLODIPINE BESYLATE 10 MG/1
TABLET ORAL
Qty: 90 TABLET | Refills: 0 | Status: SHIPPED | OUTPATIENT
Start: 2022-11-29

## 2022-12-09 ENCOUNTER — LAB ENCOUNTER (OUTPATIENT)
Dept: LAB | Age: 77
End: 2022-12-09
Attending: FAMILY MEDICINE
Payer: MEDICARE

## 2022-12-09 DIAGNOSIS — E11.29 TYPE 2 DIABETES MELLITUS WITH MICROALBUMINURIA, WITHOUT LONG-TERM CURRENT USE OF INSULIN (HCC): ICD-10-CM

## 2022-12-09 DIAGNOSIS — R80.9 TYPE 2 DIABETES MELLITUS WITH MICROALBUMINURIA, WITHOUT LONG-TERM CURRENT USE OF INSULIN (HCC): ICD-10-CM

## 2022-12-09 LAB
CHOLEST SERPL-MCNC: 241 MG/DL (ref ?–200)
EST. AVERAGE GLUCOSE BLD GHB EST-MCNC: 151 MG/DL (ref 68–126)
FASTING PATIENT LIPID ANSWER: YES
HBA1C MFR BLD: 6.9 % (ref ?–5.7)
HDLC SERPL-MCNC: 44 MG/DL (ref 40–59)
LDLC SERPL CALC-MCNC: 143 MG/DL (ref ?–100)
NONHDLC SERPL-MCNC: 197 MG/DL (ref ?–130)
TRIGL SERPL-MCNC: 296 MG/DL (ref 30–149)
VLDLC SERPL CALC-MCNC: 56 MG/DL (ref 0–30)

## 2022-12-09 PROCEDURE — 80061 LIPID PANEL: CPT

## 2022-12-09 PROCEDURE — 36415 COLL VENOUS BLD VENIPUNCTURE: CPT

## 2022-12-09 PROCEDURE — 83036 HEMOGLOBIN GLYCOSYLATED A1C: CPT

## 2022-12-19 ENCOUNTER — TELEMEDICINE (OUTPATIENT)
Dept: INTERNAL MEDICINE CLINIC | Facility: CLINIC | Age: 77
End: 2022-12-19
Payer: MEDICARE

## 2022-12-19 DIAGNOSIS — R80.9 TYPE 2 DIABETES MELLITUS WITH MICROALBUMINURIA, WITHOUT LONG-TERM CURRENT USE OF INSULIN (HCC): ICD-10-CM

## 2022-12-19 DIAGNOSIS — E11.69 DYSLIPIDEMIA ASSOCIATED WITH TYPE 2 DIABETES MELLITUS (HCC): Primary | ICD-10-CM

## 2022-12-19 DIAGNOSIS — E78.5 DYSLIPIDEMIA ASSOCIATED WITH TYPE 2 DIABETES MELLITUS (HCC): Primary | ICD-10-CM

## 2022-12-19 DIAGNOSIS — E11.29 TYPE 2 DIABETES MELLITUS WITH MICROALBUMINURIA, WITHOUT LONG-TERM CURRENT USE OF INSULIN (HCC): ICD-10-CM

## 2022-12-19 PROCEDURE — 99213 OFFICE O/P EST LOW 20 MIN: CPT | Performed by: FAMILY MEDICINE

## 2022-12-19 RX ORDER — BLOOD SUGAR DIAGNOSTIC
STRIP MISCELLANEOUS
Qty: 100 STRIP | Refills: 3 | Status: SHIPPED | OUTPATIENT
Start: 2022-12-19

## 2022-12-19 RX ORDER — ROSUVASTATIN CALCIUM 10 MG/1
10 TABLET, COATED ORAL NIGHTLY
Qty: 90 TABLET | Refills: 1 | Status: SHIPPED | OUTPATIENT
Start: 2022-12-19

## 2022-12-19 NOTE — PROGRESS NOTES
Due to COVID-19 ACTION PLAN, the patient's office visit was converted to a video visit with informed patient consent. Time Spent: 10 min    Subjective     HPI:   Miryam Leal III is a 78year old male who presents for follow-up on his labs. His A1c is overall stable. His lipid panel shows elevated LDL and TG. Endorsees consistency with his pravastatin. Taking it at night without side effects. REVIEW OF SYSTEMS:  GENERAL: Feels well otherwise. Physical Exam:  Appears well on exam. Speaking in full sentences without dyspnea. Assessment and Plan:  Miryam Leal III is presenting for follow-up    Dyslipidemia associated with type 2 diabetes mellitus ([facility] [address]  Will switch statin to Crestor with goal of improvement in dyslipidemia along with lifestyle measures. Continue current diabetic treatment otherwise. Repeat Lipid pane with DM labs prior to follow-up in April.   - rosuvastatin 10 MG Oral Tab; Take 1 tablet (10 mg total) by mouth nightly. Dispense: 90 tablet; Refill: 1  - COMP METABOLIC PANEL (14); Future  - LIPID PANEL; Future  - HEMOGLOBIN A1C; Future  - MICROALB/CREAT RATIO, RANDOM URINE; Future      Karl Saenz MD    Miryam Leal III understands phone evaluation is not a substitute for face-to-face examination or emergency care. Patient advised to go to ER or call 911 for worsening symptoms or acute distress. Please note that the following visit was completed using two-way, real-time interactive video communication. This has been done in good jc to provide continuity of care in the best interest of the provider-patient relationship, due to the on-going public health crisis/national emergency and because of restrictions of visitation. There are limitations of this visit as no physical exam could be performed. Every conscious effort was taken to allow for sufficient and adequate time.   This billing visit was spent on reviewing labs, medications, radiology tests and decision making. Appropriate medical decision-making and tests are ordered as detailed in the plan of care above.

## 2022-12-21 ENCOUNTER — TELEPHONE (OUTPATIENT)
Dept: ENDOCRINOLOGY CLINIC | Facility: CLINIC | Age: 77
End: 2022-12-21

## 2022-12-21 RX ORDER — BLOOD SUGAR DIAGNOSTIC
STRIP MISCELLANEOUS
Qty: 100 EACH | Refills: 2 | Status: SHIPPED | OUTPATIENT
Start: 2022-12-21

## 2022-12-21 NOTE — TELEPHONE ENCOUNTER
Received fax from pt pharmacy ICD code needed to be on strips for Medicare to run through part B added and signed

## 2023-01-16 ENCOUNTER — TELEPHONE (OUTPATIENT)
Dept: INTERNAL MEDICINE CLINIC | Facility: CLINIC | Age: 78
End: 2023-01-16

## 2023-01-16 NOTE — TELEPHONE ENCOUNTER
Pt made an appt through [facility] for pre-op. Pt scheduled on 2/14 with Dr. Tamiko Tomlinson  For knee replacement.   's office number [phone]  And fax number is [phone]    pw faxed to 's office and placed in brown folder and pt scheduled for pre-op on below    Future Appointments   Date Time Provider Mini Barber   2/2/2023  1:40 PM Max Luis MD [facility]

## 2023-01-17 ENCOUNTER — LAB ENCOUNTER (OUTPATIENT)
Dept: LAB | Age: 78
End: 2023-01-17
Attending: UROLOGY
Payer: MEDICARE

## 2023-01-17 DIAGNOSIS — R97.20 ELEVATED PSA: ICD-10-CM

## 2023-01-17 LAB — PSA SERPL-MCNC: 2.97 NG/ML (ref ?–4)

## 2023-01-17 PROCEDURE — 36415 COLL VENOUS BLD VENIPUNCTURE: CPT

## 2023-01-17 PROCEDURE — 84153 ASSAY OF PSA TOTAL: CPT

## 2023-01-26 ENCOUNTER — OFFICE VISIT (OUTPATIENT)
Dept: INTERNAL MEDICINE CLINIC | Facility: CLINIC | Age: 78
End: 2023-01-26
Payer: MEDICARE

## 2023-01-26 ENCOUNTER — LAB ENCOUNTER (OUTPATIENT)
Dept: LAB | Age: 78
End: 2023-01-26
Attending: FAMILY MEDICINE
Payer: MEDICARE

## 2023-01-26 VITALS
SYSTOLIC BLOOD PRESSURE: 138 MMHG | DIASTOLIC BLOOD PRESSURE: 70 MMHG | HEIGHT: 68 IN | RESPIRATION RATE: 16 BRPM | WEIGHT: 231 LBS | HEART RATE: 72 BPM | BODY MASS INDEX: 35.01 KG/M2 | OXYGEN SATURATION: 97 %

## 2023-01-26 DIAGNOSIS — Z86.2 HISTORY OF HEMOLYTIC ANEMIA: ICD-10-CM

## 2023-01-26 DIAGNOSIS — E11.29 TYPE 2 DIABETES MELLITUS WITH MICROALBUMINURIA, WITHOUT LONG-TERM CURRENT USE OF INSULIN (HCC): ICD-10-CM

## 2023-01-26 DIAGNOSIS — Z86.711 HISTORY OF PULMONARY EMBOLUS (PE): ICD-10-CM

## 2023-01-26 DIAGNOSIS — E11.69 DYSLIPIDEMIA ASSOCIATED WITH TYPE 2 DIABETES MELLITUS (HCC): ICD-10-CM

## 2023-01-26 DIAGNOSIS — I10 ESSENTIAL HYPERTENSION: ICD-10-CM

## 2023-01-26 DIAGNOSIS — Z01.818 PREOPERATIVE EXAMINATION: Primary | ICD-10-CM

## 2023-01-26 DIAGNOSIS — M17.11 PRIMARY OSTEOARTHRITIS OF RIGHT KNEE: ICD-10-CM

## 2023-01-26 DIAGNOSIS — Z96.652 S/P TKR (TOTAL KNEE REPLACEMENT), LEFT: ICD-10-CM

## 2023-01-26 DIAGNOSIS — D50.9 IRON DEFICIENCY ANEMIA, UNSPECIFIED IRON DEFICIENCY ANEMIA TYPE: ICD-10-CM

## 2023-01-26 DIAGNOSIS — G47.33 OSA TREATED WITH BIPAP: ICD-10-CM

## 2023-01-26 DIAGNOSIS — Z01.818 PREOPERATIVE EXAMINATION: ICD-10-CM

## 2023-01-26 DIAGNOSIS — R80.9 TYPE 2 DIABETES MELLITUS WITH MICROALBUMINURIA, WITHOUT LONG-TERM CURRENT USE OF INSULIN (HCC): ICD-10-CM

## 2023-01-26 DIAGNOSIS — J44.9 CHRONIC OBSTRUCTIVE PULMONARY DISEASE, UNSPECIFIED COPD TYPE (HCC): ICD-10-CM

## 2023-01-26 DIAGNOSIS — E78.5 DYSLIPIDEMIA ASSOCIATED WITH TYPE 2 DIABETES MELLITUS (HCC): ICD-10-CM

## 2023-01-26 LAB
ALBUMIN SERPL-MCNC: 4.3 G/DL (ref 3.4–5)
ALBUMIN/GLOB SERPL: 1.4 {RATIO} (ref 1–2)
ALP LIVER SERPL-CCNC: 63 U/L
ALT SERPL-CCNC: 30 U/L
ANION GAP SERPL CALC-SCNC: 9 MMOL/L (ref 0–18)
AST SERPL-CCNC: 13 U/L (ref 15–37)
ATRIAL RATE: 74 BPM
BASOPHILS # BLD AUTO: 0.1 X10(3) UL (ref 0–0.2)
BASOPHILS NFR BLD AUTO: 1 %
BILIRUB SERPL-MCNC: 0.4 MG/DL (ref 0.1–2)
BUN BLD-MCNC: 21 MG/DL (ref 7–18)
CALCIUM BLD-MCNC: 9.8 MG/DL (ref 8.5–10.1)
CHLORIDE SERPL-SCNC: 97 MMOL/L (ref 98–112)
CO2 SERPL-SCNC: 28 MMOL/L (ref 21–32)
CREAT BLD-MCNC: 1.19 MG/DL
EOSINOPHIL # BLD AUTO: 0.15 X10(3) UL (ref 0–0.7)
EOSINOPHIL NFR BLD AUTO: 1.5 %
ERYTHROCYTE [DISTWIDTH] IN BLOOD BY AUTOMATED COUNT: 14.7 %
FASTING STATUS PATIENT QL REPORTED: NO
GFR SERPLBLD BASED ON 1.73 SQ M-ARVRAT: 64 ML/MIN/1.73M2 (ref 60–?)
GLOBULIN PLAS-MCNC: 3 G/DL (ref 2.8–4.4)
GLUCOSE BLD-MCNC: 158 MG/DL (ref 70–99)
HCT VFR BLD AUTO: 38.7 %
HGB BLD-MCNC: 12.9 G/DL
IMM GRANULOCYTES # BLD AUTO: 0.05 X10(3) UL (ref 0–1)
IMM GRANULOCYTES NFR BLD: 0.5 %
LYMPHOCYTES # BLD AUTO: 3.47 X10(3) UL (ref 1–4)
LYMPHOCYTES NFR BLD AUTO: 33.9 %
MCH RBC QN AUTO: 29.7 PG (ref 26–34)
MCHC RBC AUTO-ENTMCNC: 33.3 G/DL (ref 31–37)
MCV RBC AUTO: 89 FL
MONOCYTES # BLD AUTO: 0.88 X10(3) UL (ref 0.1–1)
MONOCYTES NFR BLD AUTO: 8.6 %
NEUTROPHILS # BLD AUTO: 5.59 X10 (3) UL (ref 1.5–7.7)
NEUTROPHILS # BLD AUTO: 5.59 X10(3) UL (ref 1.5–7.7)
NEUTROPHILS NFR BLD AUTO: 54.5 %
OSMOLALITY SERPL CALC.SUM OF ELEC: 284 MOSM/KG (ref 275–295)
P AXIS: 26 DEGREES
P-R INTERVAL: 148 MS
PLATELET # BLD AUTO: 306 10(3)UL (ref 150–450)
POTASSIUM SERPL-SCNC: 3.9 MMOL/L (ref 3.5–5.1)
PROT SERPL-MCNC: 7.3 G/DL (ref 6.4–8.2)
Q-T INTERVAL: 400 MS
QRS DURATION: 122 MS
QTC CALCULATION (BEZET): 444 MS
R AXIS: 13 DEGREES
RBC # BLD AUTO: 4.35 X10(6)UL
SODIUM SERPL-SCNC: 134 MMOL/L (ref 136–145)
T AXIS: 23 DEGREES
VENTRICULAR RATE: 74 BPM
WBC # BLD AUTO: 10.2 X10(3) UL (ref 4–11)

## 2023-01-26 PROCEDURE — 99214 OFFICE O/P EST MOD 30 MIN: CPT | Performed by: FAMILY MEDICINE

## 2023-01-26 PROCEDURE — 93000 ELECTROCARDIOGRAM COMPLETE: CPT | Performed by: FAMILY MEDICINE

## 2023-01-26 PROCEDURE — 80053 COMPREHEN METABOLIC PANEL: CPT

## 2023-01-26 PROCEDURE — 85025 COMPLETE CBC W/AUTO DIFF WBC: CPT

## 2023-01-26 PROCEDURE — 36415 COLL VENOUS BLD VENIPUNCTURE: CPT

## 2023-02-03 LAB — AMB EXT HGBA1C: 6.7 %

## 2023-02-08 ENCOUNTER — TELEPHONE (OUTPATIENT)
Dept: INTERNAL MEDICINE CLINIC | Facility: CLINIC | Age: 78
End: 2023-02-08

## 2023-02-20 RX ORDER — PRAVASTATIN SODIUM 10 MG
TABLET ORAL
Qty: 90 TABLET | Refills: 1 | OUTPATIENT
Start: 2023-02-20

## 2023-02-24 RX ORDER — AMLODIPINE BESYLATE 10 MG/1
TABLET ORAL
Qty: 90 TABLET | Refills: 0 | Status: SHIPPED | OUTPATIENT
Start: 2023-02-24

## 2023-02-24 NOTE — TELEPHONE ENCOUNTER
PASSED per protocol, refill sent.   Last PE 10.4.22  Future Appointments   Date Time Provider Mini Elisabeth   3/15/2023  9:45 AM HAYDEE Maynard [facility] [place]   3/22/2023 10:45 AM Samia Young MD [facility]   4/4/2023 10:20 AM Burke Dior MD [facility]

## 2023-03-15 ENCOUNTER — OFFICE VISIT (OUTPATIENT)
Dept: ENDOCRINOLOGY CLINIC | Facility: CLINIC | Age: 78
End: 2023-03-15
Payer: MEDICARE

## 2023-03-15 VITALS
WEIGHT: 221.38 LBS | SYSTOLIC BLOOD PRESSURE: 128 MMHG | HEART RATE: 97 BPM | DIASTOLIC BLOOD PRESSURE: 62 MMHG | OXYGEN SATURATION: 97 % | RESPIRATION RATE: 17 BRPM | BODY MASS INDEX: 34 KG/M2

## 2023-03-15 DIAGNOSIS — E78.5 DYSLIPIDEMIA: ICD-10-CM

## 2023-03-15 DIAGNOSIS — E66.09 CLASS 1 OBESITY DUE TO EXCESS CALORIES WITH SERIOUS COMORBIDITY AND BODY MASS INDEX (BMI) OF 33.0 TO 33.9 IN ADULT: ICD-10-CM

## 2023-03-15 DIAGNOSIS — R80.9 TYPE 2 DIABETES MELLITUS WITH MICROALBUMINURIA, WITHOUT LONG-TERM CURRENT USE OF INSULIN (HCC): Primary | ICD-10-CM

## 2023-03-15 DIAGNOSIS — E11.29 TYPE 2 DIABETES MELLITUS WITH MICROALBUMINURIA, WITHOUT LONG-TERM CURRENT USE OF INSULIN (HCC): Primary | ICD-10-CM

## 2023-03-15 DIAGNOSIS — I10 ESSENTIAL HYPERTENSION: ICD-10-CM

## 2023-03-15 PROCEDURE — 99214 OFFICE O/P EST MOD 30 MIN: CPT | Performed by: NURSE PRACTITIONER

## 2023-03-15 RX ORDER — METFORMIN HYDROCHLORIDE 500 MG/1
TABLET, EXTENDED RELEASE ORAL
Qty: 360 TABLET | Refills: 1 | Status: SHIPPED | OUTPATIENT
Start: 2023-03-15

## 2023-03-22 ENCOUNTER — TELEPHONE (OUTPATIENT)
Dept: SURGERY | Facility: CLINIC | Age: 78
End: 2023-03-22

## 2023-03-22 ENCOUNTER — PROCEDURE (OUTPATIENT)
Dept: SURGERY | Facility: CLINIC | Age: 78
End: 2023-03-22

## 2023-03-22 DIAGNOSIS — N13.8 BPH WITH OBSTRUCTION/LOWER URINARY TRACT SYMPTOMS: ICD-10-CM

## 2023-03-22 DIAGNOSIS — C67.9 MALIGNANT NEOPLASM OF URINARY BLADDER, UNSPECIFIED SITE (HCC): Primary | ICD-10-CM

## 2023-03-22 DIAGNOSIS — N52.9 ERECTILE DYSFUNCTION, UNSPECIFIED ERECTILE DYSFUNCTION TYPE: ICD-10-CM

## 2023-03-22 DIAGNOSIS — N40.1 BPH WITH OBSTRUCTION/LOWER URINARY TRACT SYMPTOMS: ICD-10-CM

## 2023-03-22 DIAGNOSIS — R35.0 URINARY FREQUENCY: ICD-10-CM

## 2023-03-22 DIAGNOSIS — R97.20 ELEVATED PSA: ICD-10-CM

## 2023-03-22 PROCEDURE — 52000 CYSTOURETHROSCOPY: CPT | Performed by: UROLOGY

## 2023-03-22 PROCEDURE — 99214 OFFICE O/P EST MOD 30 MIN: CPT | Performed by: UROLOGY

## 2023-03-22 RX ORDER — CIPROFLOXACIN 500 MG/1
500 TABLET, FILM COATED ORAL ONCE
Status: COMPLETED | OUTPATIENT
Start: 2023-03-22 | End: 2023-03-22

## 2023-03-22 RX ADMIN — CIPROFLOXACIN 500 MG: 500 TABLET, FILM COATED ORAL at 11:02:00

## 2023-03-22 NOTE — TELEPHONE ENCOUNTER
Please schedule this patient for surgery. Thanks,    Kenisha Hinton    Urology Surgery Request  Surgeon: Jorge Ledesma (if known): EDW  Procedure: cysto, bladder biopsy with fulguration and intravesical instillation of gemcitabine (using water and monopolar bugbee)   Anesthesia: General   Time Frame: pt preference. He is hoping to do this soon  Time required: 20 minutes  Diagnosis: bladder cancer    Antibiotics: per hospital protocol unless checked below   _x_ Levaquin 500 mg IV   _x_ Gemcitabine 2 g/100 mL NS bladder instillation to be given in OR    Estimated Post Op/Follow Up Appt: ~ 10-14 days for post-op visit - can be virtual visit. Please schedule appointment at time of surgery scheduling.

## 2023-03-27 NOTE — TELEPHONE ENCOUNTER
Received a call back. Scheduling the surgery for 4/13/23. Reviewed the Pre-op instructions and sent via My Chart. Patient will contact me with any questions at [phone]. Patient would like to schedule a virtual visit for post op. Reached out to Emilie in office to inquire of where to book/double book,  as didn't see any available times.

## 2023-03-29 ENCOUNTER — LAB ENCOUNTER (OUTPATIENT)
Dept: LAB | Age: 78
End: 2023-03-29
Attending: INTERNAL MEDICINE
Payer: MEDICARE

## 2023-03-29 DIAGNOSIS — E78.5 DYSLIPIDEMIA ASSOCIATED WITH TYPE 2 DIABETES MELLITUS (HCC): ICD-10-CM

## 2023-03-29 DIAGNOSIS — E11.29 TYPE 2 DIABETES MELLITUS WITH MICROALBUMINURIA, WITHOUT LONG-TERM CURRENT USE OF INSULIN (HCC): ICD-10-CM

## 2023-03-29 DIAGNOSIS — R80.9 TYPE 2 DIABETES MELLITUS WITH MICROALBUMINURIA, WITHOUT LONG-TERM CURRENT USE OF INSULIN (HCC): ICD-10-CM

## 2023-03-29 DIAGNOSIS — E61.1 IRON DEFICIENCY: ICD-10-CM

## 2023-03-29 DIAGNOSIS — D59.10 AUTOIMMUNE HEMOLYTIC ANEMIA (HCC): ICD-10-CM

## 2023-03-29 DIAGNOSIS — E11.69 DYSLIPIDEMIA ASSOCIATED WITH TYPE 2 DIABETES MELLITUS (HCC): ICD-10-CM

## 2023-03-29 DIAGNOSIS — D52.0 DIETARY FOLATE DEFICIENCY ANEMIA: ICD-10-CM

## 2023-03-29 LAB
ALBUMIN SERPL-MCNC: 4.3 G/DL (ref 3.4–5)
ALBUMIN/GLOB SERPL: 1.7 {RATIO} (ref 1–2)
ALP LIVER SERPL-CCNC: 61 U/L
ALT SERPL-CCNC: 31 U/L
ANION GAP SERPL CALC-SCNC: 2 MMOL/L (ref 0–18)
AST SERPL-CCNC: 13 U/L (ref 15–37)
BASOPHILS # BLD AUTO: 0.08 X10(3) UL (ref 0–0.2)
BASOPHILS NFR BLD AUTO: 1.3 %
BILIRUB SERPL-MCNC: 0.5 MG/DL (ref 0.1–2)
BUN BLD-MCNC: 17 MG/DL (ref 7–18)
CALCIUM BLD-MCNC: 9.8 MG/DL (ref 8.5–10.1)
CHLORIDE SERPL-SCNC: 102 MMOL/L (ref 98–112)
CHOLEST SERPL-MCNC: 151 MG/DL (ref ?–200)
CO2 SERPL-SCNC: 30 MMOL/L (ref 21–32)
CREAT BLD-MCNC: 1.08 MG/DL
CREAT UR-SCNC: 37.3 MG/DL
DAT (C3D): NEGATIVE
DAT (IGG): POSITIVE
DEPRECATED HBV CORE AB SER IA-ACNC: 111.1 NG/ML
DIRECT COOMBS POLY: POSITIVE
ELUATE RESULT: POSITIVE
EOSINOPHIL # BLD AUTO: 0.1 X10(3) UL (ref 0–0.7)
EOSINOPHIL NFR BLD AUTO: 1.6 %
ERYTHROCYTE [DISTWIDTH] IN BLOOD BY AUTOMATED COUNT: 15.1 %
FASTING PATIENT LIPID ANSWER: YES
FASTING STATUS PATIENT QL REPORTED: YES
FOLATE SERPL-MCNC: 80.3 NG/ML (ref 8.7–?)
GFR SERPLBLD BASED ON 1.73 SQ M-ARVRAT: 72 ML/MIN/1.73M2 (ref 60–?)
GLOBULIN PLAS-MCNC: 2.6 G/DL (ref 2.8–4.4)
GLUCOSE BLD-MCNC: 155 MG/DL (ref 70–99)
HCT VFR BLD AUTO: 38 %
HDLC SERPL-MCNC: 43 MG/DL (ref 40–59)
HGB BLD-MCNC: 12.2 G/DL
HGB RETIC QN AUTO: 35.1 PG (ref 28.2–36.6)
IGA SERPL-MCNC: 128 MG/DL (ref 70–312)
IGM SERPL-MCNC: 76.2 MG/DL (ref 43–279)
IMM GRANULOCYTES # BLD AUTO: 0.04 X10(3) UL (ref 0–1)
IMM GRANULOCYTES NFR BLD: 0.6 %
IMM RETICS NFR: 0.27 RATIO (ref 0.1–0.3)
IMMUNOGLOBULIN PNL SER-MCNC: 366 MG/DL (ref 791–1643)
IRON SATN MFR SERPL: 14 %
IRON SERPL-MCNC: 50 UG/DL
LDH SERPL L TO P-CCNC: 136 U/L
LDLC SERPL CALC-MCNC: 73 MG/DL (ref ?–100)
LYMPHOCYTES # BLD AUTO: 2.11 X10(3) UL (ref 1–4)
LYMPHOCYTES NFR BLD AUTO: 33.1 %
MCH RBC QN AUTO: 30 PG (ref 26–34)
MCHC RBC AUTO-ENTMCNC: 32.1 G/DL (ref 31–37)
MCV RBC AUTO: 93.4 FL
MICROALBUMIN UR-MCNC: 1.14 MG/DL
MICROALBUMIN/CREAT 24H UR-RTO: 30.6 UG/MG (ref ?–30)
MONOCYTES # BLD AUTO: 0.53 X10(3) UL (ref 0.1–1)
MONOCYTES NFR BLD AUTO: 8.3 %
NEUTROPHILS # BLD AUTO: 3.52 X10 (3) UL (ref 1.5–7.7)
NEUTROPHILS # BLD AUTO: 3.52 X10(3) UL (ref 1.5–7.7)
NEUTROPHILS NFR BLD AUTO: 55.1 %
NONHDLC SERPL-MCNC: 108 MG/DL (ref ?–130)
OSMOLALITY SERPL CALC.SUM OF ELEC: 283 MOSM/KG (ref 275–295)
PLATELET # BLD AUTO: 294 10(3)UL (ref 150–450)
POTASSIUM SERPL-SCNC: 4.1 MMOL/L (ref 3.5–5.1)
PROT SERPL-MCNC: 6.9 G/DL (ref 6.4–8.2)
RBC # BLD AUTO: 4.07 X10(6)UL
RETICS # AUTO: 109.1 X10(3) UL (ref 22.5–147.5)
RETICS/RBC NFR AUTO: 2.7 %
SODIUM SERPL-SCNC: 134 MMOL/L (ref 136–145)
TIBC SERPL-MCNC: 364 UG/DL (ref 240–450)
TRANSFERRIN SERPL-MCNC: 244 MG/DL (ref 200–360)
TRIGL SERPL-MCNC: 208 MG/DL (ref 30–149)
VLDLC SERPL CALC-MCNC: 32 MG/DL (ref 0–30)
WBC # BLD AUTO: 6.4 X10(3) UL (ref 4–11)

## 2023-03-29 PROCEDURE — 82746 ASSAY OF FOLIC ACID SERUM: CPT

## 2023-03-29 PROCEDURE — 86334 IMMUNOFIX E-PHORESIS SERUM: CPT

## 2023-03-29 PROCEDURE — 82570 ASSAY OF URINE CREATININE: CPT

## 2023-03-29 PROCEDURE — 84165 PROTEIN E-PHORESIS SERUM: CPT

## 2023-03-29 PROCEDURE — 83521 IG LIGHT CHAINS FREE EACH: CPT

## 2023-03-29 PROCEDURE — 83540 ASSAY OF IRON: CPT

## 2023-03-29 PROCEDURE — 86860 RBC ANTIBODY ELUTION: CPT

## 2023-03-29 PROCEDURE — 83550 IRON BINDING TEST: CPT

## 2023-03-29 PROCEDURE — 82043 UR ALBUMIN QUANTITATIVE: CPT

## 2023-03-29 PROCEDURE — 82784 ASSAY IGA/IGD/IGG/IGM EACH: CPT

## 2023-03-29 PROCEDURE — 83615 LACTATE (LD) (LDH) ENZYME: CPT

## 2023-03-29 PROCEDURE — 82728 ASSAY OF FERRITIN: CPT

## 2023-03-29 PROCEDURE — 86880 COOMBS TEST DIRECT: CPT

## 2023-03-29 PROCEDURE — 36415 COLL VENOUS BLD VENIPUNCTURE: CPT

## 2023-03-29 PROCEDURE — 80053 COMPREHEN METABOLIC PANEL: CPT

## 2023-03-29 PROCEDURE — 86870 RBC ANTIBODY IDENTIFICATION: CPT

## 2023-03-29 PROCEDURE — 80061 LIPID PANEL: CPT

## 2023-03-29 PROCEDURE — 85025 COMPLETE CBC W/AUTO DIFF WBC: CPT

## 2023-03-29 PROCEDURE — 85045 AUTOMATED RETICULOCYTE COUNT: CPT

## 2023-03-31 LAB
ALBUMIN SERPL ELPH-MCNC: 4.39 G/DL (ref 3.75–5.21)
ALBUMIN/GLOB SERPL: 1.9 {RATIO} (ref 1–2)
ALPHA1 GLOB SERPL ELPH-MCNC: 0.3 G/DL (ref 0.19–0.46)
ALPHA2 GLOB SERPL ELPH-MCNC: 0.94 G/DL (ref 0.48–1.05)
B-GLOBULIN SERPL ELPH-MCNC: 0.72 G/DL (ref 0.68–1.23)
GAMMA GLOB SERPL ELPH-MCNC: 0.35 G/DL (ref 0.62–1.7)
KAPPA LC FREE SER-MCNC: 1.24 MG/DL (ref 0.33–1.94)
KAPPA LC FREE/LAMBDA FREE SER NEPH: 1.16 {RATIO} (ref 0.26–1.65)
LAMBDA LC FREE SERPL-MCNC: 1.06 MG/DL (ref 0.57–2.63)
PROT SERPL-MCNC: 6.7 G/DL (ref 6.4–8.2)

## 2023-04-04 ENCOUNTER — OFFICE VISIT (OUTPATIENT)
Dept: INTERNAL MEDICINE CLINIC | Facility: CLINIC | Age: 78
End: 2023-04-04
Payer: MEDICARE

## 2023-04-04 ENCOUNTER — TELEPHONE (OUTPATIENT)
Dept: INTERNAL MEDICINE CLINIC | Facility: CLINIC | Age: 78
End: 2023-04-04

## 2023-04-04 VITALS
HEART RATE: 76 BPM | HEIGHT: 68 IN | WEIGHT: 222.63 LBS | OXYGEN SATURATION: 97 % | BODY MASS INDEX: 33.74 KG/M2 | DIASTOLIC BLOOD PRESSURE: 72 MMHG | SYSTOLIC BLOOD PRESSURE: 138 MMHG | RESPIRATION RATE: 18 BRPM

## 2023-04-04 DIAGNOSIS — Z96.653 HISTORY OF BILATERAL KNEE REPLACEMENT: ICD-10-CM

## 2023-04-04 DIAGNOSIS — E11.69 DYSLIPIDEMIA ASSOCIATED WITH TYPE 2 DIABETES MELLITUS (HCC): ICD-10-CM

## 2023-04-04 DIAGNOSIS — E11.29 TYPE 2 DIABETES MELLITUS WITH MICROALBUMINURIA, WITHOUT LONG-TERM CURRENT USE OF INSULIN (HCC): Primary | ICD-10-CM

## 2023-04-04 DIAGNOSIS — R80.9 TYPE 2 DIABETES MELLITUS WITH MICROALBUMINURIA, WITHOUT LONG-TERM CURRENT USE OF INSULIN (HCC): Primary | ICD-10-CM

## 2023-04-04 DIAGNOSIS — C67.9 MALIGNANT NEOPLASM OF URINARY BLADDER, UNSPECIFIED SITE (HCC): ICD-10-CM

## 2023-04-04 DIAGNOSIS — D59.10 AUTOIMMUNE HEMOLYTIC ANEMIA (HCC): ICD-10-CM

## 2023-04-04 DIAGNOSIS — E78.5 DYSLIPIDEMIA ASSOCIATED WITH TYPE 2 DIABETES MELLITUS (HCC): ICD-10-CM

## 2023-04-04 DIAGNOSIS — I10 ESSENTIAL HYPERTENSION: ICD-10-CM

## 2023-04-04 PROCEDURE — 99214 OFFICE O/P EST MOD 30 MIN: CPT | Performed by: FAMILY MEDICINE

## 2023-04-04 RX ORDER — DOXYCYCLINE HYCLATE 100 MG/1
100 CAPSULE ORAL
COMMUNITY
Start: 2023-03-29

## 2023-04-04 NOTE — TELEPHONE ENCOUNTER
[facility]ders to [facility], notify pt when labs are entered   Future Appointments   Date Time Provider Mini Arauzi   4/24/2023 12:45 PM Tonya Lin MD [facility]   10/5/2023 10:20 AM Robson Fox MD [facility]

## 2023-04-13 ENCOUNTER — HOSPITAL ENCOUNTER (OUTPATIENT)
Facility: HOSPITAL | Age: 78
Setting detail: HOSPITAL OUTPATIENT SURGERY
Discharge: HOME | End: 2023-04-13
Attending: UROLOGY | Admitting: UROLOGY
Payer: MEDICARE

## 2023-04-13 ENCOUNTER — APPOINTMENT (OUTPATIENT)
Dept: GENERAL RADIOLOGY | Facility: HOSPITAL | Age: 78
End: 2023-04-13
Attending: UROLOGY
Payer: MEDICARE

## 2023-04-13 ENCOUNTER — ANESTHESIA EVENT (OUTPATIENT)
Dept: SURGERY | Facility: HOSPITAL | Age: 78
End: 2023-04-13
Payer: MEDICARE

## 2023-04-13 ENCOUNTER — ANESTHESIA (OUTPATIENT)
Dept: SURGERY | Facility: HOSPITAL | Age: 78
End: 2023-04-13
Payer: MEDICARE

## 2023-04-13 VITALS
WEIGHT: 220 LBS | TEMPERATURE: 98 F | OXYGEN SATURATION: 98 % | BODY MASS INDEX: 33.34 KG/M2 | DIASTOLIC BLOOD PRESSURE: 71 MMHG | SYSTOLIC BLOOD PRESSURE: 132 MMHG | HEART RATE: 82 BPM | RESPIRATION RATE: 14 BRPM | HEIGHT: 68 IN

## 2023-04-13 DIAGNOSIS — C67.9 MALIGNANT NEOPLASM OF URINARY BLADDER, UNSPECIFIED SITE (HCC): ICD-10-CM

## 2023-04-13 DIAGNOSIS — Z01.812 ENCOUNTER FOR PREPROCEDURE SCREENING LABORATORY TESTING FOR COVID-19: Primary | ICD-10-CM

## 2023-04-13 DIAGNOSIS — Z20.822 ENCOUNTER FOR PREPROCEDURE SCREENING LABORATORY TESTING FOR COVID-19: Primary | ICD-10-CM

## 2023-04-13 LAB
GLUCOSE BLD-MCNC: 168 MG/DL (ref 70–99)
GLUCOSE BLD-MCNC: 168 MG/DL (ref 70–99)

## 2023-04-13 PROCEDURE — 52234 CYSTOSCOPY AND TREATMENT: CPT | Performed by: UROLOGY

## 2023-04-13 PROCEDURE — 51720 TREATMENT OF BLADDER LESION: CPT | Performed by: UROLOGY

## 2023-04-13 PROCEDURE — 0TBB8ZZ EXCISION OF BLADDER, VIA NATURAL OR ARTIFICIAL OPENING ENDOSCOPIC: ICD-10-PCS | Performed by: UROLOGY

## 2023-04-13 RX ORDER — ACETAMINOPHEN 500 MG
1000 TABLET ORAL ONCE AS NEEDED
Status: DISCONTINUED | OUTPATIENT
Start: 2023-04-13 | End: 2023-04-13

## 2023-04-13 RX ORDER — ONDANSETRON 2 MG/ML
4 INJECTION INTRAMUSCULAR; INTRAVENOUS EVERY 6 HOURS PRN
Status: DISCONTINUED | OUTPATIENT
Start: 2023-04-13 | End: 2023-04-13

## 2023-04-13 RX ORDER — NICOTINE POLACRILEX 4 MG
30 LOZENGE BUCCAL
Status: DISCONTINUED | OUTPATIENT
Start: 2023-04-13 | End: 2023-04-13 | Stop reason: HOSPADM

## 2023-04-13 RX ORDER — ACETAMINOPHEN 500 MG
1000 TABLET ORAL ONCE
Status: DISCONTINUED | OUTPATIENT
Start: 2023-04-13 | End: 2023-04-13 | Stop reason: HOSPADM

## 2023-04-13 RX ORDER — NALOXONE HYDROCHLORIDE 0.4 MG/ML
80 INJECTION, SOLUTION INTRAMUSCULAR; INTRAVENOUS; SUBCUTANEOUS AS NEEDED
Status: DISCONTINUED | OUTPATIENT
Start: 2023-04-13 | End: 2023-04-13

## 2023-04-13 RX ORDER — DIPHENHYDRAMINE HYDROCHLORIDE 50 MG/ML
12.5 INJECTION INTRAMUSCULAR; INTRAVENOUS AS NEEDED
Status: DISCONTINUED | OUTPATIENT
Start: 2023-04-13 | End: 2023-04-13

## 2023-04-13 RX ORDER — NICOTINE POLACRILEX 4 MG
15 LOZENGE BUCCAL
Status: DISCONTINUED | OUTPATIENT
Start: 2023-04-13 | End: 2023-04-13 | Stop reason: HOSPADM

## 2023-04-13 RX ORDER — HYDROCODONE BITARTRATE AND ACETAMINOPHEN 5; 325 MG/1; MG/1
2 TABLET ORAL ONCE AS NEEDED
Status: DISCONTINUED | OUTPATIENT
Start: 2023-04-13 | End: 2023-04-13

## 2023-04-13 RX ORDER — LEVOFLOXACIN 5 MG/ML
500 INJECTION, SOLUTION INTRAVENOUS ONCE
Status: DISCONTINUED | OUTPATIENT
Start: 2023-04-13 | End: 2023-04-13 | Stop reason: HOSPADM

## 2023-04-13 RX ORDER — METOCLOPRAMIDE HYDROCHLORIDE 5 MG/ML
10 INJECTION INTRAMUSCULAR; INTRAVENOUS EVERY 8 HOURS PRN
Status: DISCONTINUED | OUTPATIENT
Start: 2023-04-13 | End: 2023-04-13

## 2023-04-13 RX ORDER — HYDROMORPHONE HYDROCHLORIDE 1 MG/ML
0.2 INJECTION, SOLUTION INTRAMUSCULAR; INTRAVENOUS; SUBCUTANEOUS EVERY 5 MIN PRN
Status: DISCONTINUED | OUTPATIENT
Start: 2023-04-13 | End: 2023-04-13

## 2023-04-13 RX ORDER — CIPROFLOXACIN 500 MG/1
500 TABLET, FILM COATED ORAL 2 TIMES DAILY
Qty: 8 TABLET | Refills: 0 | Status: SHIPPED | OUTPATIENT
Start: 2023-04-13 | End: 2023-04-17

## 2023-04-13 RX ORDER — HYDROMORPHONE HYDROCHLORIDE 1 MG/ML
0.6 INJECTION, SOLUTION INTRAMUSCULAR; INTRAVENOUS; SUBCUTANEOUS EVERY 5 MIN PRN
Status: DISCONTINUED | OUTPATIENT
Start: 2023-04-13 | End: 2023-04-13

## 2023-04-13 RX ORDER — LIDOCAINE HYDROCHLORIDE 10 MG/ML
INJECTION, SOLUTION EPIDURAL; INFILTRATION; INTRACAUDAL; PERINEURAL AS NEEDED
Status: DISCONTINUED | OUTPATIENT
Start: 2023-04-13 | End: 2023-04-13 | Stop reason: SURG

## 2023-04-13 RX ORDER — HYDROMORPHONE HYDROCHLORIDE 1 MG/ML
0.4 INJECTION, SOLUTION INTRAMUSCULAR; INTRAVENOUS; SUBCUTANEOUS EVERY 5 MIN PRN
Status: DISCONTINUED | OUTPATIENT
Start: 2023-04-13 | End: 2023-04-13

## 2023-04-13 RX ORDER — HYDROCODONE BITARTRATE AND ACETAMINOPHEN 5; 325 MG/1; MG/1
1 TABLET ORAL ONCE AS NEEDED
Status: DISCONTINUED | OUTPATIENT
Start: 2023-04-13 | End: 2023-04-13

## 2023-04-13 RX ORDER — SODIUM CHLORIDE, SODIUM LACTATE, POTASSIUM CHLORIDE, CALCIUM CHLORIDE 600; 310; 30; 20 MG/100ML; MG/100ML; MG/100ML; MG/100ML
INJECTION, SOLUTION INTRAVENOUS CONTINUOUS
Status: DISCONTINUED | OUTPATIENT
Start: 2023-04-13 | End: 2023-04-13

## 2023-04-13 RX ORDER — ONDANSETRON 2 MG/ML
INJECTION INTRAMUSCULAR; INTRAVENOUS AS NEEDED
Status: DISCONTINUED | OUTPATIENT
Start: 2023-04-13 | End: 2023-04-13 | Stop reason: SURG

## 2023-04-13 RX ORDER — DOCUSATE SODIUM 100 MG/1
100 CAPSULE, LIQUID FILLED ORAL 2 TIMES DAILY PRN
Qty: 30 CAPSULE | Refills: 0 | Status: SHIPPED | OUTPATIENT
Start: 2023-04-13

## 2023-04-13 RX ORDER — DEXTROSE MONOHYDRATE 25 G/50ML
50 INJECTION, SOLUTION INTRAVENOUS
Status: DISCONTINUED | OUTPATIENT
Start: 2023-04-13 | End: 2023-04-13 | Stop reason: HOSPADM

## 2023-04-13 RX ORDER — DEXAMETHASONE SODIUM PHOSPHATE 4 MG/ML
VIAL (ML) INJECTION AS NEEDED
Status: DISCONTINUED | OUTPATIENT
Start: 2023-04-13 | End: 2023-04-13 | Stop reason: SURG

## 2023-04-13 RX ORDER — LIDOCAINE HYDROCHLORIDE 20 MG/ML
JELLY TOPICAL AS NEEDED
Status: DISCONTINUED | OUTPATIENT
Start: 2023-04-13 | End: 2023-04-13 | Stop reason: HOSPADM

## 2023-04-13 RX ORDER — MEPERIDINE HYDROCHLORIDE 25 MG/ML
12.5 INJECTION INTRAMUSCULAR; INTRAVENOUS; SUBCUTANEOUS AS NEEDED
Status: DISCONTINUED | OUTPATIENT
Start: 2023-04-13 | End: 2023-04-13

## 2023-04-13 RX ORDER — LEVOFLOXACIN 5 MG/ML
INJECTION, SOLUTION INTRAVENOUS
Status: DISCONTINUED
Start: 2023-04-13 | End: 2023-04-13

## 2023-04-13 RX ORDER — HYDROCODONE BITARTRATE AND ACETAMINOPHEN 5; 325 MG/1; MG/1
1 TABLET ORAL EVERY 6 HOURS PRN
Qty: 30 TABLET | Refills: 0 | Status: SHIPPED | OUTPATIENT
Start: 2023-04-13

## 2023-04-13 RX ADMIN — ONDANSETRON 4 MG: 2 INJECTION INTRAMUSCULAR; INTRAVENOUS at 08:43:00

## 2023-04-13 RX ADMIN — LIDOCAINE HYDROCHLORIDE 100 MG: 10 INJECTION, SOLUTION EPIDURAL; INFILTRATION; INTRACAUDAL; PERINEURAL at 08:43:00

## 2023-04-13 RX ADMIN — SODIUM CHLORIDE, SODIUM LACTATE, POTASSIUM CHLORIDE, CALCIUM CHLORIDE: 600; 310; 30; 20 INJECTION, SOLUTION INTRAVENOUS at 08:38:00

## 2023-04-13 RX ADMIN — DEXAMETHASONE SODIUM PHOSPHATE 8 MG: 4 MG/ML VIAL (ML) INJECTION at 08:43:00

## 2023-04-13 NOTE — DISCHARGE INSTRUCTIONS
You had a procedure called a CYSTOSCOPY today    - No heavy lifting or strenuous activity for 5 days.    - You may have a post-operative appointment that is already scheduled for you. If the appointment date or time does not work with your schedule please call our office to reschedule ([phone]). Alternatively our office may be reaching out to you to schedule the appointment. - You may experience pain after the procedure for 1-2 days. If pain becomes intolerable please contact our office or go to the nearest Emergency Room/Immediate Care. You make take over-the counter ibuprofen for mild pain (provided you do not have a medical condition such as stomach ulcers or kidney disease which prohibits you from taking). You may take this in addition to tylenol or narcotic pain medication. Hot packs may help for discomfort as well. - If you take blood thinners (such as aspirin or plavix) please DO NOT take them when you go home. You may resume these medications in 2 weeks. - If you are medically allowed to take ibuprofen then you may take 200-400 mg every 8 hours as needed for pain with plenty of fluids. You may take this in addition to tylenol or other pain medication which may be prescribed. - You may experience burning with urination and frequency of urination over the next few days.     - You may see blood in your urine that should clear up within a few days. If you have a stent in place then you may see blood in the urine until the stent is eventually removed. - Try to abstain from alcohol, coffee, tea, artificial sweeteners, and spicy food for the next 48 hours as these can irritate the bladder.     - If you develop a fever, chills, difficulty urinating or abdominal pain in the next 24 hours, call the office.     - Drink 1.5 to 2 liters of fluid today, water is preferable.  If you are on a fluid restriction due to other medical reasons then you need to adhere to your fluid restriction recommendations.

## 2023-04-13 NOTE — ANESTHESIA POSTPROCEDURE EVALUATION
[address] [facility] III Patient Status:  Hospital Outpatient Surgery   Age/Gender 78year old male MRN [id number]   Location [facility] Attending Priya Buck MD   [facility] Day # 0 PCP Kelli Chavez MD       Anesthesia Post-op Note    CYSTOSCOPY, BLADDER BIOPSY WITH FULGURATION AND INTRAVESICAL INSTILLATION OF GEMCITABINE (USING WATER AND MONOPOLAR Jeralene Spillers)    Procedure Summary     Date: 04/13/23 Room / Location: [address] [place] [address] MAIN OR    Anesthesia Start: 3965 Anesthesia Stop: 3712    Procedure: CYSTOSCOPY, BLADDER BIOPSY WITH FULGURATION AND INTRAVESICAL INSTILLATION OF GEMCITABINE (USING WATER AND MONOPOLAR BUGBEE) (Bladder) Diagnosis:       Malignant neoplasm of urinary bladder, unspecified site ([facility] [address]      (Malignant neoplasm of urinary bladder, unspecified site [facility]) [C67.9])    Surgeons: Priya Buck MD Anesthesiologist: Sergey Hastings MD    Anesthesia Type: general ASA Status: 3          Anesthesia Type: general    Vitals Value Taken Time   /77 04/13/23 0932   Temp 98.2 04/13/23 0932   Pulse 78 04/13/23 0932   Resp 16 04/13/23 0932   SpO2 98 04/13/23 0932       Patient Location: PACU    Anesthesia Type: general    Airway Patency: patent    Postop Pain Control: adequate    Mental Status: mildly sedated but able to meaningfully participate in the post-anesthesia evaluation    Nausea/Vomiting: none    Cardiopulmonary/Hydration status: stable euvolemic    Complications: no apparent anesthesia related complications    Postop vital signs: stable    Dental Exam: Unchanged from Preop

## 2023-04-13 NOTE — INTERVAL H&P NOTE
Pre-op Diagnosis: Malignant neoplasm of urinary bladder, unspecified site [facility]) [C67.9]    The above referenced H&P was reviewed by Mayuri Johnson MD on 4/13/2023, the patient was examined and no significant changes have occurred in the patient's condition since the H&P was performed. I discussed with the patient and/or legal representative the potential benefits, risks and side effects of this procedure; the likelihood of the patient achieving goals; and potential problems that might occur during recuperation. I discussed reasonable alternatives to the procedure, including risks, benefits and side effects related to the alternatives and risks related to not receiving this procedure. We will proceed with procedure as planned.

## 2023-04-13 NOTE — ANESTHESIA PROCEDURE NOTES
Airway  Date/Time: 4/13/2023 8:41 AM  Urgency: elective      General Information and Staff    Patient location during procedure: OR  Anesthesiologist: Anamika Conteh MD  Performed: anesthesiologist   Performed by: Anamika Conteh MD  Authorized by: Anamika Conteh MD      Indications and Patient Condition  Indications for airway management: anesthesia  Sedation level: deep  Preoxygenated: yes  Patient position: sniffing  Mask difficulty assessment: 1 - vent by mask    Final Airway Details  Final airway type: supraglottic airway      Successful airway: classic  Size 2.5       Number of attempts at approach: 1

## 2023-04-13 NOTE — OPERATIVE REPORT
Urology Operative Note    Attending Surgeon: Roe Ramires MD    Patient Name: Shane Sigala III    Date of Surgery: 4/13/2023    Preoperative Diagnosis: bladder cancer    Postoperative Diagnosis: same    Procedure Performed: Cystoscopy, transurethral resection of bladder tumor with intravesical instillation of gemcitabine    Indication for procedure:  Patient is a 78year old male with a history of bladder cancer. He was noted to have four small (~ 3-4 mm) tumors scattered throughout bladder - one just lateral to right ureteral orifice, one on posterior bladder wall, and two on anterior bladder wall close to the trigone. The total affected area was ~ `1.5 - 2 cm in diameter. The patient was counseled on options and elected to undergo the aforementioned procedure. We discussed the risks and benefits to surgery. We discussed risks including, but not limited to, bleeding, infection, possible damage to surrounding structures, possible need for temporary flores catheter following the procedure. We also discussed the risks and benefits to intravesical instillation of gemcitabine. The patient understood these risks and wished to proceed with surgery. Description of the procedure: The patient was taken to the operating room and prepped and draped in lithotomy position after undergoing general anesthesia. The cystoscope was inserted. The bladder tumor burden was easily identified. We then proceeded with transurethral resection of bladder tumor. All visible tumor burden was completely resected. All oozing blood vessels were identified and fulgurated. No residual tumor or bleeding was noted at the end of the case. The bladder was drained and the scope was removed. I then inserted a 18F flores catheter and instilled intravesical gemcitabine. The flores was clamped for 30 minutes. The bladder tumor specimens were sent to pathology. The patient was awoken having tolerated the procedure well.     Specimen: bladder tumor    Complications: No known complications    Condition on Discharge from the operating room was stable    Plan: The chemotherapy will be drained 30 minutes from instillation and the flores catheter removed. The patient will follow up with me in 7-10 days to review pathology.     Edmund Edmondson MD  Date: 4/13/2023  Time: 9:28 AM

## 2023-04-24 ENCOUNTER — TELEMEDICINE (OUTPATIENT)
Dept: SURGERY | Facility: CLINIC | Age: 78
End: 2023-04-24

## 2023-04-24 ENCOUNTER — TELEPHONE (OUTPATIENT)
Dept: SURGERY | Facility: CLINIC | Age: 78
End: 2023-04-24

## 2023-04-24 DIAGNOSIS — N40.1 BPH WITH OBSTRUCTION/LOWER URINARY TRACT SYMPTOMS: ICD-10-CM

## 2023-04-24 DIAGNOSIS — N52.9 ERECTILE DYSFUNCTION, UNSPECIFIED ERECTILE DYSFUNCTION TYPE: ICD-10-CM

## 2023-04-24 DIAGNOSIS — N13.8 BPH WITH OBSTRUCTION/LOWER URINARY TRACT SYMPTOMS: ICD-10-CM

## 2023-04-24 DIAGNOSIS — C67.9 MALIGNANT NEOPLASM OF URINARY BLADDER, UNSPECIFIED SITE (HCC): Primary | ICD-10-CM

## 2023-04-24 DIAGNOSIS — R35.0 URINARY FREQUENCY: ICD-10-CM

## 2023-04-24 DIAGNOSIS — R97.20 ELEVATED PSA: ICD-10-CM

## 2023-04-24 PROCEDURE — 99443 PHONE E/M BY PHYS 21-30 MIN: CPT | Performed by: UROLOGY

## 2023-04-24 RX ORDER — AMLODIPINE BESYLATE 10 MG/1
TABLET ORAL
Qty: 90 TABLET | Refills: 0 | Status: SHIPPED | OUTPATIENT
Start: 2023-04-24

## 2023-04-24 NOTE — TELEPHONE ENCOUNTER
Please schedule patient for 3 weeks BCG to start no sooner than 4-6 weeks from recent bladder biopsy. He should have cysto with cytology with me in ~ 4 months and no sooner than 6 weeks following completion of BCG.     Thanks,  MPH

## 2023-04-24 NOTE — TELEPHONE ENCOUNTER
PASSED per protocol, refill sent.   Last PE 10.4.22  Future Appointments   Date Time Provider Mini Arauzi   4/24/2023 12:45 PM Darlene Seymour MD [facility] 4   4/26/2023  1:00 PM Funmilayo Dunne MD [address]   4/26/2023  1:30 PM [address] [place] [address]   10/5/2023 10:20 AM Dago Mendez MD [facility]

## 2023-04-26 ENCOUNTER — OFFICE VISIT (OUTPATIENT)
Dept: HEMATOLOGY/ONCOLOGY | Facility: HOSPITAL | Age: 78
End: 2023-04-26
Attending: INTERNAL MEDICINE
Payer: MEDICARE

## 2023-04-26 VITALS
RESPIRATION RATE: 20 BRPM | TEMPERATURE: 98 F | SYSTOLIC BLOOD PRESSURE: 146 MMHG | BODY MASS INDEX: 34 KG/M2 | OXYGEN SATURATION: 95 % | DIASTOLIC BLOOD PRESSURE: 76 MMHG | HEART RATE: 91 BPM | WEIGHT: 224 LBS

## 2023-04-26 VITALS
DIASTOLIC BLOOD PRESSURE: 76 MMHG | SYSTOLIC BLOOD PRESSURE: 150 MMHG | OXYGEN SATURATION: 96 % | HEART RATE: 79 BPM | TEMPERATURE: 98 F

## 2023-04-26 DIAGNOSIS — D50.9 IRON DEFICIENCY ANEMIA, UNSPECIFIED IRON DEFICIENCY ANEMIA TYPE: Primary | ICD-10-CM

## 2023-04-26 DIAGNOSIS — D59.10 AUTOIMMUNE HEMOLYTIC ANEMIA (HCC): Primary | ICD-10-CM

## 2023-04-26 DIAGNOSIS — D50.9 IRON DEFICIENCY ANEMIA, UNSPECIFIED IRON DEFICIENCY ANEMIA TYPE: ICD-10-CM

## 2023-04-26 DIAGNOSIS — C67.9 MALIGNANT NEOPLASM OF URINARY BLADDER, UNSPECIFIED SITE (HCC): ICD-10-CM

## 2023-04-26 PROCEDURE — 99214 OFFICE O/P EST MOD 30 MIN: CPT | Performed by: INTERNAL MEDICINE

## 2023-04-26 PROCEDURE — 96365 THER/PROPH/DIAG IV INF INIT: CPT

## 2023-04-26 RX ORDER — MELATONIN
325
COMMUNITY

## 2023-04-26 NOTE — PROGRESS NOTES
Education Record    Learner:  Patient    Disease / Austin Gracia    Barriers / Limitations:  None   Comments:    Method:  Discussion   Comments:    General Topics:  Plan of care reviewed   Comments:    Outcome:  Shows understanding   Comments:  Recent cystoscopy with Dr Yulia Koch. Showed a little bladder cancer   He is feeling well. Energy level is good. Take iron 65mg daily, tolerating well.

## 2023-04-26 NOTE — PROGRESS NOTES
Education Record    Learner:  Patient    Disease / Diagnosis: pt here for monoferric    Barriers / Limitations:  None   Comments:    Method:  Brief focused   Comments:    General Topics:  Plan of care reviewed   Comments:    Outcome:  Shows understanding   Comments:

## 2023-04-28 NOTE — TELEPHONE ENCOUNTER
BCG order completed and faxed to [facility] at [phone]. [place] message sent to patient.

## 2023-05-08 ENCOUNTER — TELEMEDICINE (OUTPATIENT)
Dept: INTERNAL MEDICINE CLINIC | Facility: CLINIC | Age: 78
End: 2023-05-08
Payer: MEDICARE

## 2023-05-08 DIAGNOSIS — Z12.11 SCREENING FOR COLON CANCER: ICD-10-CM

## 2023-05-08 DIAGNOSIS — D59.10 AUTOIMMUNE HEMOLYTIC ANEMIA (HCC): Primary | ICD-10-CM

## 2023-05-08 DIAGNOSIS — E61.1 IRON DEFICIENCY: ICD-10-CM

## 2023-05-08 DIAGNOSIS — C67.9 MALIGNANT NEOPLASM OF URINARY BLADDER, UNSPECIFIED SITE (HCC): ICD-10-CM

## 2023-05-08 NOTE — PROGRESS NOTES
Due to COVID-19 ACTION PLAN, the patient's office visit was converted to a video visit with informed patient consent. Time Spent: 15 min    Subjective     HPI:   Marv Jasso is a 78year old male who presents for follow-up. He had recent follow-up with Dr. Alyx Vogt and has remained iron def with oral supplementation. He had 1 dose of IV Fe. Denies active bleeding. He is due for colonoscopy. He lives in [place] and has trouble traveling to [facility]. He is underling BCG treatment for his bladder cancer. REVIEW OF SYSTEMS:  GENERAL: No fever, chills, feels well otherwise. Physical Exam:  Appears well on exam.    Assessment and Plan:  Americo Maciel III is presenting for follow-up. Iron deficiency  Autoimmune hemolytic anemia (HCC)  Malignant neoplasm of urinary bladder  Screening for colon cancer  Referral placed for colonoscopy given he is due for colon cancer screening and for further evaluation of his iron deficiency which is likely multifactorial. He will continue to follow-up with hematology for his h/o hemolytic anemia and with urology for his bladder cancer.   - GASTRO - INTERNAL    Estela Quan MD    Americo Maciel III understands phone evaluation is not a substitute for face-to-face examination or emergency care. Patient advised to go to ER or call 911 for worsening symptoms or acute distress. Please note that the following visit was completed using two-way, real-time interactive video communication. This has been done in good jc to provide continuity of care in the best interest of the provider-patient relationship, due to the on-going public health crisis/national emergency and because of restrictions of visitation. There are limitations of this visit as no physical exam could be performed. Every conscious effort was taken to allow for sufficient and adequate time.   This billing visit was spent on reviewing labs, medications, radiology tests and decision making. Appropriate medical decision-making and tests are ordered as detailed in the plan of care above.

## 2023-05-09 ENCOUNTER — TELEPHONE (OUTPATIENT)
Dept: HEMATOLOGY/ONCOLOGY | Facility: HOSPITAL | Age: 78
End: 2023-05-09

## 2023-05-17 ENCOUNTER — OFFICE VISIT (OUTPATIENT)
Dept: HEMATOLOGY/ONCOLOGY | Facility: HOSPITAL | Age: 78
End: 2023-05-17
Attending: UROLOGY
Payer: MEDICARE

## 2023-05-17 VITALS
BODY MASS INDEX: 33.8 KG/M2 | DIASTOLIC BLOOD PRESSURE: 67 MMHG | SYSTOLIC BLOOD PRESSURE: 145 MMHG | HEIGHT: 67.99 IN | WEIGHT: 223 LBS | RESPIRATION RATE: 18 BRPM | OXYGEN SATURATION: 94 % | TEMPERATURE: 98 F | HEART RATE: 89 BPM

## 2023-05-17 DIAGNOSIS — C67.9 MALIGNANT NEOPLASM OF URINARY BLADDER, UNSPECIFIED SITE (HCC): Primary | ICD-10-CM

## 2023-05-17 LAB
BILIRUB UR QL STRIP.AUTO: NEGATIVE
CLARITY UR REFRACT.AUTO: CLEAR
COLOR UR AUTO: YELLOW
GLUCOSE UR STRIP.AUTO-MCNC: NEGATIVE MG/DL
KETONES UR STRIP.AUTO-MCNC: NEGATIVE MG/DL
LEUKOCYTE ESTERASE UR QL STRIP.AUTO: NEGATIVE
NITRITE UR QL STRIP.AUTO: NEGATIVE
PH UR STRIP.AUTO: 5 [PH] (ref 5–8)
PROT UR STRIP.AUTO-MCNC: NEGATIVE MG/DL
RBC UR QL AUTO: NEGATIVE
SP GR UR STRIP.AUTO: 1.01 (ref 1–1.03)
UROBILINOGEN UR STRIP.AUTO-MCNC: <2 MG/DL

## 2023-05-17 PROCEDURE — 81003 URINALYSIS AUTO W/O SCOPE: CPT

## 2023-05-17 PROCEDURE — 51720 TREATMENT OF BLADDER LESION: CPT

## 2023-05-17 RX ORDER — LIDOCAINE HYDROCHLORIDE 20 MG/ML
10 JELLY TOPICAL ONCE
Start: 2023-05-24 | End: 2023-05-24

## 2023-05-17 RX ORDER — LIDOCAINE HYDROCHLORIDE 20 MG/ML
10 JELLY TOPICAL ONCE
Status: COMPLETED | OUTPATIENT
Start: 2023-05-17 | End: 2023-05-17

## 2023-05-17 RX ADMIN — LIDOCAINE HYDROCHLORIDE 10 ML: 20 JELLY TOPICAL at 13:58:00

## 2023-05-17 NOTE — PROGRESS NOTES
Education Record    Learner:  Patient    Disease / Diagnosis: Pt here for BCG    Barriers / Limitations:  None    Method:  Brief focused, printed material and  reinforcement    General Topics:  Plan of care reviewed    Outcome:  Shows understanding. Pt tolerated BCG without incident. Urojet applied prior to flores insertion. Due to driving distance he plans to come 1 hour early for his UA. Appt requested for next visit. Left treatment area in stable condition.

## 2023-05-24 ENCOUNTER — OFFICE VISIT (OUTPATIENT)
Dept: HEMATOLOGY/ONCOLOGY | Facility: HOSPITAL | Age: 78
End: 2023-05-24
Attending: UROLOGY
Payer: MEDICARE

## 2023-05-24 VITALS
WEIGHT: 226.19 LBS | DIASTOLIC BLOOD PRESSURE: 73 MMHG | HEART RATE: 83 BPM | OXYGEN SATURATION: 98 % | SYSTOLIC BLOOD PRESSURE: 152 MMHG | BODY MASS INDEX: 34.28 KG/M2 | TEMPERATURE: 98 F | HEIGHT: 67.99 IN | RESPIRATION RATE: 18 BRPM

## 2023-05-24 DIAGNOSIS — C67.9 MALIGNANT NEOPLASM OF URINARY BLADDER, UNSPECIFIED SITE (HCC): Primary | ICD-10-CM

## 2023-05-24 LAB
BILIRUB UR QL STRIP.AUTO: NEGATIVE
CLARITY UR REFRACT.AUTO: CLEAR
COLOR UR AUTO: YELLOW
GLUCOSE UR STRIP.AUTO-MCNC: NEGATIVE MG/DL
KETONES UR STRIP.AUTO-MCNC: NEGATIVE MG/DL
LEUKOCYTE ESTERASE UR QL STRIP.AUTO: NEGATIVE
NITRITE UR QL STRIP.AUTO: NEGATIVE
PH UR STRIP.AUTO: 6 [PH] (ref 5–8)
PROT UR STRIP.AUTO-MCNC: NEGATIVE MG/DL
RBC UR QL AUTO: NEGATIVE
SP GR UR STRIP.AUTO: 1.01 (ref 1–1.03)
UROBILINOGEN UR STRIP.AUTO-MCNC: <2 MG/DL

## 2023-05-24 PROCEDURE — 81003 URINALYSIS AUTO W/O SCOPE: CPT

## 2023-05-24 PROCEDURE — 51720 TREATMENT OF BLADDER LESION: CPT

## 2023-05-24 RX ORDER — LIDOCAINE HYDROCHLORIDE 20 MG/ML
10 JELLY TOPICAL ONCE
Start: 2023-05-31 | End: 2023-05-31

## 2023-05-24 RX ORDER — LIDOCAINE HYDROCHLORIDE 20 MG/ML
10 JELLY TOPICAL ONCE
Status: COMPLETED | OUTPATIENT
Start: 2023-05-24 | End: 2023-05-24

## 2023-05-24 RX ADMIN — LIDOCAINE HYDROCHLORIDE 10 ML: 20 JELLY TOPICAL at 13:30:00

## 2023-05-24 NOTE — PROGRESS NOTES
Education Record    Learner:  Patient    Disease / Diagnosis:BCG bladder instillation  Barriers / Limitations:  None   Comments:    Method:  Discussion   Comments:    General Topics:  Medication, Procedure and Plan of care reviewed   Comments:    Outcome:  Shows understanding   Comments:Patient states feels fine, no problems since last instillation. urojet inserted and flores catheter inserted using sterile technique and bladder drained. BCG instilled into bladder and catheter removed. Patient instructed to not urinate until 4pm. Verbalizes understanding.

## 2023-05-30 RX ORDER — LIDOCAINE HYDROCHLORIDE 20 MG/ML
10 JELLY TOPICAL ONCE
Status: CANCELLED
Start: 2023-05-31 | End: 2023-05-31

## 2023-05-31 ENCOUNTER — OFFICE VISIT (OUTPATIENT)
Dept: HEMATOLOGY/ONCOLOGY | Facility: HOSPITAL | Age: 78
End: 2023-05-31
Attending: UROLOGY
Payer: MEDICARE

## 2023-05-31 VITALS
HEART RATE: 70 BPM | SYSTOLIC BLOOD PRESSURE: 122 MMHG | OXYGEN SATURATION: 96 % | TEMPERATURE: 97 F | RESPIRATION RATE: 18 BRPM | DIASTOLIC BLOOD PRESSURE: 69 MMHG

## 2023-05-31 DIAGNOSIS — C67.9 MALIGNANT NEOPLASM OF URINARY BLADDER, UNSPECIFIED SITE (HCC): Primary | ICD-10-CM

## 2023-05-31 DIAGNOSIS — D59.10 AUTOIMMUNE HEMOLYTIC ANEMIA (HCC): ICD-10-CM

## 2023-05-31 DIAGNOSIS — D50.9 IRON DEFICIENCY ANEMIA, UNSPECIFIED IRON DEFICIENCY ANEMIA TYPE: ICD-10-CM

## 2023-05-31 LAB
BASOPHILS # BLD AUTO: 0.07 X10(3) UL (ref 0–0.2)
BASOPHILS NFR BLD AUTO: 0.8 %
BILIRUB UR QL STRIP.AUTO: NEGATIVE
CLARITY UR REFRACT.AUTO: CLEAR
COLOR UR AUTO: YELLOW
DEPRECATED HBV CORE AB SER IA-ACNC: 471.3 NG/ML
EOSINOPHIL # BLD AUTO: 0.13 X10(3) UL (ref 0–0.7)
EOSINOPHIL NFR BLD AUTO: 1.5 %
ERYTHROCYTE [DISTWIDTH] IN BLOOD BY AUTOMATED COUNT: 15 %
GLUCOSE UR STRIP.AUTO-MCNC: NEGATIVE MG/DL
HCT VFR BLD AUTO: 35.1 %
HGB BLD-MCNC: 11.9 G/DL
HGB RETIC QN AUTO: 36.1 PG (ref 28.2–36.6)
IMM GRANULOCYTES # BLD AUTO: 0.05 X10(3) UL (ref 0–1)
IMM GRANULOCYTES NFR BLD: 0.6 %
IMM RETICS NFR: 0.15 RATIO (ref 0.1–0.3)
IRON SATN MFR SERPL: 19 %
IRON SERPL-MCNC: 55 UG/DL
KETONES UR STRIP.AUTO-MCNC: NEGATIVE MG/DL
LDH SERPL L TO P-CCNC: 151 U/L
LEUKOCYTE ESTERASE UR QL STRIP.AUTO: NEGATIVE
LYMPHOCYTES # BLD AUTO: 2.72 X10(3) UL (ref 1–4)
LYMPHOCYTES NFR BLD AUTO: 31.1 %
MCH RBC QN AUTO: 29.8 PG (ref 26–34)
MCHC RBC AUTO-ENTMCNC: 33.9 G/DL (ref 31–37)
MCV RBC AUTO: 87.8 FL
MONOCYTES # BLD AUTO: 0.83 X10(3) UL (ref 0.1–1)
MONOCYTES NFR BLD AUTO: 9.5 %
NEUTROPHILS # BLD AUTO: 4.95 X10 (3) UL (ref 1.5–7.7)
NEUTROPHILS # BLD AUTO: 4.95 X10(3) UL (ref 1.5–7.7)
NEUTROPHILS NFR BLD AUTO: 56.5 %
NITRITE UR QL STRIP.AUTO: NEGATIVE
PH UR STRIP.AUTO: 5 [PH] (ref 5–8)
PLATELET # BLD AUTO: 262 10(3)UL (ref 150–450)
PROT UR STRIP.AUTO-MCNC: NEGATIVE MG/DL
RBC # BLD AUTO: 4 X10(6)UL
RBC UR QL AUTO: NEGATIVE
RETICS # AUTO: 80.4 X10(3) UL (ref 22.5–147.5)
RETICS/RBC NFR AUTO: 2 %
SP GR UR STRIP.AUTO: 1.01 (ref 1–1.03)
TIBC SERPL-MCNC: 285 UG/DL (ref 240–450)
TRANSFERRIN SERPL-MCNC: 191 MG/DL (ref 200–360)
UROBILINOGEN UR STRIP.AUTO-MCNC: <2 MG/DL
WBC # BLD AUTO: 8.8 X10(3) UL (ref 4–11)

## 2023-05-31 PROCEDURE — 36415 COLL VENOUS BLD VENIPUNCTURE: CPT

## 2023-05-31 PROCEDURE — 83550 IRON BINDING TEST: CPT

## 2023-05-31 PROCEDURE — 85045 AUTOMATED RETICULOCYTE COUNT: CPT

## 2023-05-31 PROCEDURE — 81003 URINALYSIS AUTO W/O SCOPE: CPT

## 2023-05-31 PROCEDURE — 83615 LACTATE (LD) (LDH) ENZYME: CPT

## 2023-05-31 PROCEDURE — 85025 COMPLETE CBC W/AUTO DIFF WBC: CPT

## 2023-05-31 PROCEDURE — 83540 ASSAY OF IRON: CPT

## 2023-05-31 PROCEDURE — 82728 ASSAY OF FERRITIN: CPT

## 2023-05-31 PROCEDURE — 51720 TREATMENT OF BLADDER LESION: CPT

## 2023-05-31 RX ORDER — LIDOCAINE HYDROCHLORIDE 20 MG/ML
10 JELLY TOPICAL ONCE
Status: COMPLETED | OUTPATIENT
Start: 2023-05-31 | End: 2023-05-31

## 2023-05-31 RX ADMIN — LIDOCAINE HYDROCHLORIDE 10 ML: 20 JELLY TOPICAL at 14:20:00

## 2023-05-31 NOTE — PROGRESS NOTES
Education Record    Learner:  Patient    Disease / Diagnosis: bladder ca    Barriers / Limitations:  None   Comments:    Method:  Discussion   Comments:    General Topics:  Plan of care reviewed   Comments:    Outcome:  Shows understanding   Comments:    Pt here for labs and BCG instillation. Pt declines urinary burning/frequency/urgency. Lidocaine urojet utilized prior to catheterization. Pt discharged in stable condition.

## 2023-06-01 ENCOUNTER — TELEPHONE (OUTPATIENT)
Dept: HEMATOLOGY/ONCOLOGY | Facility: HOSPITAL | Age: 78
End: 2023-06-01

## 2023-06-01 NOTE — TELEPHONE ENCOUNTER
Luis Fernando Bowers MD  [facility]  Please call, still remains iron deficient. He lives far so needs 1 dose iron, 1000 mg monoferric like last time if available, then labs 1 month later. When was his last colonoscopy      Last colonoscopy 12 years ago. Stated he is having a hard time getting appointment near where he lives. Does not want it at [address] [place] [address] because it is too far away and would need a ride. Iron infusion is scheduled for June 6th at 2:30PM   Patient aware.

## 2023-06-06 ENCOUNTER — OFFICE VISIT (OUTPATIENT)
Dept: HEMATOLOGY/ONCOLOGY | Facility: HOSPITAL | Age: 78
End: 2023-06-06
Attending: UROLOGY
Payer: MEDICARE

## 2023-06-06 VITALS
SYSTOLIC BLOOD PRESSURE: 134 MMHG | TEMPERATURE: 98 F | OXYGEN SATURATION: 95 % | HEART RATE: 86 BPM | DIASTOLIC BLOOD PRESSURE: 70 MMHG

## 2023-06-06 DIAGNOSIS — C67.9 MALIGNANT NEOPLASM OF URINARY BLADDER, UNSPECIFIED SITE (HCC): Primary | ICD-10-CM

## 2023-06-06 PROCEDURE — 96365 THER/PROPH/DIAG IV INF INIT: CPT

## 2023-06-06 NOTE — PROGRESS NOTES
Education Record    Learner:  Patient    Disease / Diagnosis: anemia    Barriers / Limitations:  None   Comments:    Method:  Discussion   Comments:    General Topics:  Plan of care reviewed   Comments:    Outcome:  Shows understanding   Comments:    Pt here for monoferric infusion. Observed for 30 mins post infusion completion. Tolerated well. Discharged in stable condition.

## 2023-06-08 DIAGNOSIS — E78.5 DYSLIPIDEMIA ASSOCIATED WITH TYPE 2 DIABETES MELLITUS (HCC): ICD-10-CM

## 2023-06-08 DIAGNOSIS — E11.69 DYSLIPIDEMIA ASSOCIATED WITH TYPE 2 DIABETES MELLITUS (HCC): ICD-10-CM

## 2023-06-09 RX ORDER — ROSUVASTATIN CALCIUM 10 MG/1
TABLET, COATED ORAL
Qty: 90 TABLET | Refills: 1 | Status: SHIPPED | OUTPATIENT
Start: 2023-06-09

## 2023-06-09 NOTE — TELEPHONE ENCOUNTER
Rx #: [id number]     Cholesterol Medication Protocol Passed 06/08/2023 09:10 AM   Protocol Details  ALT < 80    ALT resulted within past year    Lipid panel within past 12 months    Appointment within past 12 or next 3 months        LOV 5/8/23 mk video visit     LAST LAB  3/29/23     LAST RX 12/19/22 90 with 1     Next OV   Future Appointments   Date Time Provider Mini Barber   7/5/2023  1:30 PM [facility] OOT [facility]   10/5/2023 10:20 AM Duran Flores MD [facility]         PROTOCOL pass

## 2023-07-05 ENCOUNTER — NURSE ONLY (OUTPATIENT)
Dept: HEMATOLOGY/ONCOLOGY | Facility: HOSPITAL | Age: 78
End: 2023-07-05
Attending: UROLOGY
Payer: MEDICARE

## 2023-07-05 DIAGNOSIS — D50.9 IRON DEFICIENCY ANEMIA, UNSPECIFIED IRON DEFICIENCY ANEMIA TYPE: ICD-10-CM

## 2023-07-05 LAB
BASOPHILS # BLD AUTO: 0.06 X10(3) UL (ref 0–0.2)
BASOPHILS NFR BLD AUTO: 0.7 %
DEPRECATED HBV CORE AB SER IA-ACNC: 545.5 NG/ML
EOSINOPHIL # BLD AUTO: 0.12 X10(3) UL (ref 0–0.7)
EOSINOPHIL NFR BLD AUTO: 1.5 %
ERYTHROCYTE [DISTWIDTH] IN BLOOD BY AUTOMATED COUNT: 15 %
HCT VFR BLD AUTO: 36.1 %
HGB BLD-MCNC: 12 G/DL
HGB RETIC QN AUTO: 36 PG (ref 28.2–36.6)
IMM GRANULOCYTES # BLD AUTO: 0.05 X10(3) UL (ref 0–1)
IMM GRANULOCYTES NFR BLD: 0.6 %
IMM RETICS NFR: 0.21 RATIO (ref 0.1–0.3)
IRON SATN MFR SERPL: 23 %
IRON SERPL-MCNC: 66 UG/DL
LDH SERPL L TO P-CCNC: 147 U/L
LYMPHOCYTES # BLD AUTO: 2.67 X10(3) UL (ref 1–4)
LYMPHOCYTES NFR BLD AUTO: 33.1 %
MCH RBC QN AUTO: 30.1 PG (ref 26–34)
MCHC RBC AUTO-ENTMCNC: 33.2 G/DL (ref 31–37)
MCV RBC AUTO: 90.5 FL
MONOCYTES # BLD AUTO: 0.77 X10(3) UL (ref 0.1–1)
MONOCYTES NFR BLD AUTO: 9.6 %
NEUTROPHILS # BLD AUTO: 4.39 X10 (3) UL (ref 1.5–7.7)
NEUTROPHILS # BLD AUTO: 4.39 X10(3) UL (ref 1.5–7.7)
NEUTROPHILS NFR BLD AUTO: 54.5 %
PLATELET # BLD AUTO: 232 10(3)UL (ref 150–450)
RBC # BLD AUTO: 3.99 X10(6)UL
RETICS # AUTO: 101.3 X10(3) UL (ref 22.5–147.5)
RETICS/RBC NFR AUTO: 2.5 %
TIBC SERPL-MCNC: 292 UG/DL (ref 240–450)
TRANSFERRIN SERPL-MCNC: 196 MG/DL (ref 200–360)
WBC # BLD AUTO: 8.1 X10(3) UL (ref 4–11)

## 2023-07-05 PROCEDURE — 36415 COLL VENOUS BLD VENIPUNCTURE: CPT

## 2023-07-05 PROCEDURE — 83615 LACTATE (LD) (LDH) ENZYME: CPT

## 2023-07-05 PROCEDURE — 85045 AUTOMATED RETICULOCYTE COUNT: CPT

## 2023-07-05 PROCEDURE — 85025 COMPLETE CBC W/AUTO DIFF WBC: CPT

## 2023-07-05 PROCEDURE — 83540 ASSAY OF IRON: CPT

## 2023-07-05 PROCEDURE — 82728 ASSAY OF FERRITIN: CPT

## 2023-07-05 PROCEDURE — 83550 IRON BINDING TEST: CPT

## 2023-08-14 ENCOUNTER — ANESTHESIA (OUTPATIENT)
Dept: ENDOSCOPY | Facility: HOSPITAL | Age: 78
End: 2023-08-14
Payer: MEDICARE

## 2023-08-14 ENCOUNTER — HOSPITAL ENCOUNTER (OUTPATIENT)
Facility: HOSPITAL | Age: 78
Setting detail: HOSPITAL OUTPATIENT SURGERY
Discharge: HOME | End: 2023-08-14
Attending: STUDENT IN AN ORGANIZED HEALTH CARE EDUCATION/TRAINING PROGRAM | Admitting: STUDENT IN AN ORGANIZED HEALTH CARE EDUCATION/TRAINING PROGRAM
Payer: MEDICARE

## 2023-08-14 ENCOUNTER — ANESTHESIA EVENT (OUTPATIENT)
Dept: ENDOSCOPY | Facility: HOSPITAL | Age: 78
End: 2023-08-14
Payer: MEDICARE

## 2023-08-14 VITALS
WEIGHT: 220 LBS | HEART RATE: 72 BPM | HEIGHT: 68 IN | TEMPERATURE: 97 F | SYSTOLIC BLOOD PRESSURE: 120 MMHG | DIASTOLIC BLOOD PRESSURE: 65 MMHG | BODY MASS INDEX: 33.34 KG/M2 | OXYGEN SATURATION: 98 % | RESPIRATION RATE: 16 BRPM

## 2023-08-14 DIAGNOSIS — K21.9 GASTROESOPHAGEAL REFLUX DISEASE, UNSPECIFIED WHETHER ESOPHAGITIS PRESENT: ICD-10-CM

## 2023-08-14 DIAGNOSIS — Z12.11 COLON CANCER SCREENING: ICD-10-CM

## 2023-08-14 LAB — GLUCOSE BLD-MCNC: 189 MG/DL (ref 70–99)

## 2023-08-14 PROCEDURE — 88305 TISSUE EXAM BY PATHOLOGIST: CPT | Performed by: STUDENT IN AN ORGANIZED HEALTH CARE EDUCATION/TRAINING PROGRAM

## 2023-08-14 PROCEDURE — 0DB98ZX EXCISION OF DUODENUM, VIA NATURAL OR ARTIFICIAL OPENING ENDOSCOPIC, DIAGNOSTIC: ICD-10-PCS | Performed by: INTERNAL MEDICINE

## 2023-08-14 PROCEDURE — 0DB78ZX EXCISION OF STOMACH, PYLORUS, VIA NATURAL OR ARTIFICIAL OPENING ENDOSCOPIC, DIAGNOSTIC: ICD-10-PCS | Performed by: INTERNAL MEDICINE

## 2023-08-14 PROCEDURE — 82962 GLUCOSE BLOOD TEST: CPT

## 2023-08-14 PROCEDURE — 0DBL8ZX EXCISION OF TRANSVERSE COLON, VIA NATURAL OR ARTIFICIAL OPENING ENDOSCOPIC, DIAGNOSTIC: ICD-10-PCS | Performed by: INTERNAL MEDICINE

## 2023-08-14 RX ORDER — LIDOCAINE HYDROCHLORIDE 10 MG/ML
INJECTION, SOLUTION EPIDURAL; INFILTRATION; INTRACAUDAL; PERINEURAL AS NEEDED
Status: DISCONTINUED | OUTPATIENT
Start: 2023-08-14 | End: 2023-08-14 | Stop reason: SURG

## 2023-08-14 RX ORDER — ONDANSETRON 2 MG/ML
4 INJECTION INTRAMUSCULAR; INTRAVENOUS AS NEEDED
Status: DISCONTINUED | OUTPATIENT
Start: 2023-08-14 | End: 2023-08-14

## 2023-08-14 RX ORDER — SODIUM CHLORIDE, SODIUM LACTATE, POTASSIUM CHLORIDE, CALCIUM CHLORIDE 600; 310; 30; 20 MG/100ML; MG/100ML; MG/100ML; MG/100ML
INJECTION, SOLUTION INTRAVENOUS CONTINUOUS
Status: DISCONTINUED | OUTPATIENT
Start: 2023-08-14 | End: 2023-08-14

## 2023-08-14 RX ORDER — NICOTINE POLACRILEX 4 MG
15 LOZENGE BUCCAL
Status: DISCONTINUED | OUTPATIENT
Start: 2023-08-14 | End: 2023-08-14

## 2023-08-14 RX ORDER — NICOTINE POLACRILEX 4 MG
30 LOZENGE BUCCAL
Status: DISCONTINUED | OUTPATIENT
Start: 2023-08-14 | End: 2023-08-14

## 2023-08-14 RX ORDER — DEXTROSE MONOHYDRATE 25 G/50ML
50 INJECTION, SOLUTION INTRAVENOUS
Status: DISCONTINUED | OUTPATIENT
Start: 2023-08-14 | End: 2023-08-14

## 2023-08-14 RX ADMIN — SODIUM CHLORIDE, SODIUM LACTATE, POTASSIUM CHLORIDE, CALCIUM CHLORIDE: 600; 310; 30; 20 INJECTION, SOLUTION INTRAVENOUS at 07:51:00

## 2023-08-14 RX ADMIN — LIDOCAINE HYDROCHLORIDE 80 MG: 10 INJECTION, SOLUTION EPIDURAL; INFILTRATION; INTRACAUDAL; PERINEURAL at 07:16:00

## 2023-08-14 NOTE — ANESTHESIA POSTPROCEDURE EVALUATION
[address] [facility],[address] III Patient Status:  Hospital Outpatient Surgery   Age/Gender 70year old male MRN [id number]   Location [address] [facility] 28 Attending Ajith Allred, [address] [facility] [place] [facility] # 0 PCP Jyotsna Caldwell MD       Anesthesia Post-op Note    COLONOSCOPY with cold snare polypectomy  ESOPHAGOGASTRODUODENOSCOPY    Procedure Summary       Date: 08/14/23 Room / Location: [facility] ENDOSCOPY 02 / [facility] ENDOSCOPY    Anesthesia Start: 0712 Anesthesia Stop: 0926    Procedures:       COLONOSCOPY with cold snare polypectomy  ESOPHAGOGASTRODUODENOSCOPY      ESOPHAGOGASTRODUODENOSCOPY with BX Diagnosis:       Colon cancer screening      Gastroesophageal reflux disease, unspecified whether esophagitis present      (EGD=gastritis  COLON=diverticulosis, polyps, hemorrhoids)    Surgeons: Ajith Allred DO Anesthesiologist: Kalina Hernandez MD    Anesthesia Type: MAC ASA Status: 3            Anesthesia Type: MAC    Vitals Value Taken Time   /62 08/14/23 0753   Temp  08/14/23 0753   Pulse 76 08/14/23 0753   Resp 18 08/14/23 0753   SpO2 96 08/14/23 0753       Patient Location: Endoscopy    Anesthesia Type: MAC    Airway Patency: patent    Postop Pain Control: adequate    Mental Status: mildly sedated but able to meaningfully participate in the post-anesthesia evaluation    Nausea/Vomiting: none    Cardiopulmonary/Hydration status: stable euvolemic    Complications: no apparent anesthesia related complications    Postop vital signs: stable    Dental Exam: Unchanged from Preop    Patient to be discharged home when criteria met.

## 2023-08-14 NOTE — H&P
History & Physical Examination    Patient Name: Kamille Cheung  MRN: [id number]  CSN: [id number]  YOB: 1945    Diagnosis: Iron deficiency anemia and GERD    Present Illness: 77y/o male history of iron deficiency anemia, GERD, hemolytic anemia, hypertension, hyperlipidemia, diabetes, COPD, MOMO, asthma presents for EGD and colonoscopy. ROSUVASTATIN 10 MG Oral Tab, TAKE 1 TABLET BY MOUTH EVERY DAY AT NIGHT, Disp: 90 tablet, Rfl: 1, 8/13/2023  AMLODIPINE 10 MG Oral Tab, TAKE 1 TABLET BY MOUTH EVERY DAY, Disp: 90 tablet, Rfl: 0, 8/13/2023  HYDROcodone-acetaminophen (NORCO) 5-325 MG Oral Tab, Take 1 tablet by mouth every 6 (six) hours as needed for Pain., Disp: 30 tablet, Rfl: 0  metFORMIN  MG Oral Tablet 24 Hr, 2 tab twice daily (Patient taking differently: Take 2 tablets (1,000 mg total) by mouth 2 (two) times daily with meals. 2 tab twice daily), Disp: 360 tablet, Rfl: 1, 8/13/2023  Losartan Potassium-HCTZ 100-12.5 MG Oral Tab, Take 1 tablet by mouth daily. , Disp: 90 tablet, Rfl: 3, 8/14/2023  famotidine 20 MG Oral Tab, Take 1 tablet (20 mg total) by mouth daily. , Disp: , Rfl:   IPRATROPIUM BROMIDE 0.06 % Nasal Solution, PLACE 2 SPRAYS INTO EACH NOSTRIL TWICE DAILY, Disp: 3 each, Rfl: 2, 8/14/2023  Budesonide-Formoterol Fumarate (SYMBICORT) 80-4.5 MCG/ACT Inhalation Aerosol, Inhale 2 puffs into the lungs 2 (two) times daily. , Disp: 30.6 each, Rfl: 2, 8/14/2023  triamcinolone 0.1 % External Cream, Apply topically 2 (two) times daily as needed. , Disp: , Rfl:   Acetaminophen (ACETAMINOPHEN EXTRA STRENGTH) 500 MG Oral Cap, Take 2 capsules (1,000 mg total) by mouth as needed. , Disp: , Rfl: , Past Week  Fluticasone Propionate 50 MCG/ACT Nasal Suspension, SPRAY 2 SPRAYS INTO EACH NOSTRIL EVERY DAY, Disp: 1 Bottle, Rfl: 11, 8/14/2023  Multiple Vitamin (ONE-A-DAY MENS) Oral Tab, Take 1 tablet by mouth daily. , Disp: , Rfl: , Past Week  Fexofenadine HCl (ALLEGRA OR), Take 180 mg by mouth daily.   , Disp: , Rfl: , 8/14/2023  Omeprazole 20 MG Oral Tab EC, Take 20 mg by mouth daily. , Disp: , Rfl: , 8/13/2023  Cholecalciferol (VITAMIN D3) 1000 UNITS Oral Cap, Take 1 tablet by mouth daily. , Disp: , Rfl: , 3/26/0135  folic acid (FOLVITE) 054 MCG Oral Tab, Take 2 tablets (1,600 mcg total) by mouth daily. , Disp: , Rfl: , 8/13/2023  PEG 3350-KCl-NaBcb-NaCl-NaSulf (PEG 3350/ELECTROLYTES) 240 g Oral Recon Soln, Take as directed by physician., Disp: 4000 mL, Rfl: 0  ferrous sulfate 325 (65 FE) MG Oral Tab EC, Take 1 tablet (325 mg total) by mouth daily with breakfast., Disp: , Rfl:   docusate sodium 100 MG Oral Cap, Take 1 capsule (100 mg total) by mouth 2 (two) times daily as needed for constipation. , Disp: 30 capsule, Rfl: 0, More than a month  diphenhydrAMINE 50 MG Oral Cap, Take 1 capsule (50 mg total) by mouth every 6 (six) hours as needed for Itching., Disp: , Rfl: , More than a month  Albuterol Sulfate HFA (PROAIR HFA) 108 (90 Base) MCG/ACT Inhalation Aero Soln, Inhale 2 puffs into the lungs every 4 (four) hours as needed for Wheezing or Shortness of Breath., Disp: 1 Inhaler, Rfl: 3, More than a month      glucose (Dex4) 15 GM/59ML oral liquid 15 g, 15 g, Oral, Q15 Min PRN   Or  glucose (Glutose) 40% oral gel 15 g, 15 g, Oral, Q15 Min PRN   Or  glucose-vitamin C (Dex-4) chewable tab 4 tablet, 4 tablet, Oral, Q15 Min PRN   Or  dextrose 50% injection 50 mL, 50 mL, Intravenous, Q15 Min PRN   Or  glucose (Dex4) 15 GM/59ML oral liquid 30 g, 30 g, Oral, Q15 Min PRN   Or  glucose (Glutose) 40% oral gel 30 g, 30 g, Oral, Q15 Min PRN   Or  glucose-vitamin C (Dex-4) chewable tab 8 tablet, 8 tablet, Oral, Q15 Min PRN  lactated ringers infusion, , Intravenous, Continuous  lactated ringers infusion, , Intravenous, Continuous  atropine 0.1 MG/ML injection 0.5 mg, 0.5 mg, Intravenous, PRN  ondansetron (Zofran) 4 MG/2ML injection 4 mg, 4 mg, Intravenous, PRN        Allergies:   Cefaclor                RASH  Radiology Contrast * RASH  Atovaquone              RASH    Comment:Rash all over body  Bactrim                 RASH  Sulfa Antibiotics       RASH  Sulfa Drugs Cross R*    RASH    Past Medical History:   Diagnosis Date    Acute venous embolism and thrombosis of unspecified deep vessels of lower extremity     Allergic rhinitis 2011    Arthritis 2015    Asthma 2011    Bladder cancer ([facility]     BLOOD CLOTS     Blood disorder     Cancer ([facility] 2021    bladder    Cataract     COPD (chronic obstructive pulmonary disease) ([facility] 2012    not using oxygen    Dermatitis due to drugs and medicines taken internally(693.0)     Diabetes ([facility] 11/2014    Diabetes mellitus ([facility]     Edema     Elevated blood pressure reading without diagnosis of hypertension     Esophageal reflux     Extrinsic asthma, unspecified     Food intolerance Bloat    Hearing impairment     tinnitus    Hearing loss     Heartburn 2012    Hemolytic anemia (HCC)     Hepatitis, unspecified     age 27 yr; no s/s time of diagnosis; no treatment; no current symptoms for 50 years    Hereditary hemolytic anemia, unspecified (HCC)     High blood pressure     High cholesterol     History of blood transfusion     no reactions    HOSPITALIZATIONS     PCP pneumonia and ALI    HYPERLIPIDEMIA     Infectious mononucleosis     Jaundice 2010    Hemolytic Anemia    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    OBESITY     Otalgia, unspecified     Other and unspecified hyperlipidemia     OTHER DISEASES     autoimmune hemolytic pneumonia    OTHER DISEASES     PCP pneumonia    OTHER DISEASES     PE    OTHER DISEASES     eczema    Other specified disorders of pancreatic internal secretion     Personal history of antineoplastic chemotherapy 03/03/2022    gemcitabine instillation    Personal history of pneumonia (recurrent)     Personal history of venous thrombosis and embolism     Plantar fasciitis     Pneumonia, organism unspecified(486)     PONV (postoperative nausea and vomiting)     nausea    Postnasal drip Psoriasis     PULMONARY EMBOLISM     Sleep apnea, obstructive EDW  DX   5-06-11/ TX 7-15-11    AHI  36.4/ supine  115/ krysten  80%/ BIPAP 13/9 / Sleep  RX     Unspecified essential hypertension     Unspecified sleep apnea     AHI 36, O2 krysten of 80%, CPAP    Unspecified tinnitus     Visual impairment      Past Surgical History:   Procedure Laterality Date    BRONCHOSCOPY,BIOPSY  02/2011    CATARACT      10 and 11/2021    COLONOSCOPY      x 2 at age 50 and 56    COLONOSCOPY  02/09/2014    Procedure: COLONOSCOPY;  Surgeon: Verlan Burkitt, MD;  [facility] ENDOSCOPY    COLONOSCOPY  08/2014    and EGD    KNEE REPLACEMENT SURGERY  Left Knee 08/2022    Right knee 02-14-23    [facility]      SKIN SURGERY  2009    tags, moles    TOTAL KNEE REPLACEMENT Bilateral     VASECTOMY  1992     Family History   Problem Relation Age of Onset    Other (Other) Mother         hepatitis b/DJD    Alcohol and Other Disorders Associated Father     Asthma Sister     Other (Other) Sister         osteoarthritis    Ovarian Cancer Sister     Diabetes Brother     Hypertension Brother     Stroke Brother     Mental Disorder Brother         DD    Stroke Brother     Alcohol and Other Disorders Associated Son     Hypertension Brother      Social History    Tobacco Use      Smoking status: Former        Packs/day: 1.50        Years: 40.00        Pack years: 61        Types: Cigarettes, Pipe, Cigars        Start date: 1/26/1962        Quit date: 1/3/2008        Years since quitting: 15.6      Smokeless tobacco: Never    Alcohol use: Not Currently      Comment: Quit 2008      SYSTEM Check if Review is Normal Check if Physical Exam is Normal If not normal, please explain:   HEENT [ x] [x ]    NECK & BACK [ x] [ x]    HEART [ x] [x ]    LUNGS [ x] [ x]    ABDOMEN [ x] [x ]    UROGENITAL [ n/a] [ n/a]    EXTREMITIES [ x] [x ]    OTHER        [ x ] I have discussed the risks and benefits and alternatives with the patient/family.   They understand and agree to proceed with plan of care. [ x ] I have reviewed the History and Physical done within the last 30 days. Any changes noted above.     Karuna Holloway,   8/14/2023  8:02 AM

## 2023-08-14 NOTE — OPERATIVE REPORT
EGD Operative Report  Patient Name: Maral Nunn III  YOB: 1945  MRN: [id number]  Procedure: Esophagogastroduodenoscopy (EGD) with biopsy  Pre-operative Diagnosis & Indication: Iron deficiency anemia, GERD  Post-operative Diagnosis:   Normal-appearing esophagus with GE junction at 40 cm  Mild gastritis status post biopsy to rule out H. pylori  Normal-appearing duodenum status post biopsy to rule out celiac disease  Attending Endoscopist: Erinn Angulo D.O. Informed Consent: The planned procedure(s), the explanation of the procedure, its expected benefits, the potential complications and risks and possible alternatives and their benefits and risks were discussed with the patient or the patient's surrogate. The discussion of risks, not limited to but including bleeding, infection, perforation, adverse effects from anesthesia, need for emergency surgery/prolonged hospitalization,  cardiac arrhythmias,  and aspiration were discussed with patient. Pt and/or surrogate understood the proposed procedure(s), its risks, benefits and alternatives and wish to proceed with procedure(s). All questions answered in full. After all questions were answered to their satisfaction, a signed, informed, and witnessed consent was obtained. A TIME OUT WAS COMPLETED prior to the procedure to confirm the patient, procedure(s) and complete endoscopy safety procedure. Sedation: Monitored Anesthesia Care; ASA class per anesthesiology team   Monitoring: Pulsoximetry, pulse, respirations, and blood pressure , vitals were monitored throughout the entire procedure under monitored anesthesia care. Procedure: The patient was then brought to the endoscopy suite where his/her pulse, pulse oximetry and blood pressure were monitored. The patient was placed in the left lateral decubitus position and deep sedation was administered.  Once adequate sedation was achieved, a bite block was placed and a lubricated tip of an Olympus video upper endoscope was inserted through the oropharynx and gently manipulated through the esophagus into the stomach and the second portion of the duodenum. Upon withdrawal of the endoscope, careful visualization of the mucosa was performed. The endoscope was then withdrawn into the gastric antrum and placed in a retroflexed position. The endoscope was then righted, and air was suctioned from the stomach. The endoscope was then withdrawn from the patient, with careful visual inspection of the mucosa. The patient left the procedure room in stable condition for recovery. Findings and endotherapy as listed below  Toleration: Patient tolerated procedure well  Complications: No immediate complications  Technical Difficulty: The procedure was not technically difficult   Estimated Blood Loss: Minimal, less than 5mL of estimated blood loss. Findings and Therapeutics:  Esophagus: The mucosa was normal.   There were no strictures or stenosis. GEJ junction traversed with endoscope without resistance. The Z-line was irregular, appreciated at 40 cm from the incisors. Stomach: The gastric body, antrum, fundus, cardia, and angularis were mildly erythematous endoscopic consistent with mild gastritis. No ulcers, erosions or masses visualized. Endoscope was placed in a retroflexion view in the stomach. There was no evidence of a hiatal hernia. Biopsies with cold forceps were obtained to assess for H. pylori. Duodenum:   The entire examined duodenum was normal.  Biopsies with cold forceps were obtained to assess for celiac disease. Recommendations:  Post EGD precautions, watch for bleeding, infection, perforation, adverse drug reactions   Follow-up biopsies  Continue omeprazole 40 mg once daily.   Continue famotidine 20 mg nightly  Avoid non-aspirin NSAID  Proceed with colonoscopy    Nina Myles DO  8/14/2023  7:53 AM

## 2023-08-14 NOTE — OPERATIVE REPORT
Colonoscopy Operative Report  Patient Name: Valentin Colon III  YOB: 1945  MRN: [id number]  Procedure: Colonoscopy with polypectomy  Pre-operative Diagnosis & Indication: Iron deficiency anemia  Post-operative Diagnosis:   2 transverse colon polyps ranging in size from 3 mm to 5 mm both removed using cold snare  Pandiverticulosis  Small internal hemorrhoids  Attending Endoscopist: Vikki Harding D.O. Informed Consent: The planned procedure(s), the explanation of the procedure, its expected benefits, the potential complications and risks and possible alternatives and their benefits and risks were discussed with the patient or the patient's surrogate. The discussion of risks, not limited to but including bleeding, infection, perforation, adverse effects from anesthesia, need for emergency surgery, medication effects, cardiac arrhythmias, missed polyps, and aspiration and death, were discussed with patient. Pt and/or surrogate understood the proposed procedure(s), its risks, benefits and alternatives and wish to proceed with procedure(s). All questions answered in full. After all questions were answered to their satisfaction, a signed, informed, and witnessed consent was obtained. A TIME OUT WAS COMPLETED prior to the procedure to confirm the patient, procedure and complete endoscopy safety procedure. Sedation: Monitored Anesthesia Care; ASA class per anesthesiology team   Monitoring: Pulsoximetry, pulse, respirations, and blood pressure, vitals were monitored throughout the entire procedure under monitored anesthesia care. Preparation Quality:  [place] Bowel Prep Score: 8  [Right 2/ Transverse 3/ Left 3]   Procedure: After achieving adequate sedation, and placing the patient in the left lateral decubitus position, a digital rectal examination was performed. The lubricated tip of the adult colonoscope was then introduced into the rectum and advanced to the cecum.   The appendiceal orifice and ileocecal valve were clearly and distinctly visualized, thus verifying the cecum. The terminal ileum was intubated. The endoscope was then carefully withdrawn from the patient with careful visualization of the colonic mucosa to the best of my ability, with bowel preparation quality as noted above. Air was suctioned to the best of my ability, during withdrawal of the endoscope. When the endoscope reached the rectum, it was placed in a retroflexed position, and the rectal bulb was thus visualized. The endoscope was righted, and air was suctioned from the colon to the best of my ability, as it was during withdrawn from the colon. The endoscope was then removed from the patient. The patient tolerated the procedure without apparent procedural complications. The patient left the procedure room in stable condition for recovery. Toleration: Patient tolerated procedure well  Complications: No immediate complications  Technical Difficulty: The procedure was not technically difficult   Findings and Therapeutics:  Terminal Ileum:  The entire examined ileum was normal.   Colon: The entire visualized colon was normal.  2 polyps were noted in the transverse colon ranging in size from 3 mm to 5 mm and both removed using cold snare. No signs of inflammation, ulceration or erosions. There were diverticula noted throughout the entire visualized colon. Rectum: There were small sized, internal hemorrhoids seen on retroflexion. Recommendations:   Post Colonoscopy/polypectomy precautions, watch for bleeding, infection, perforation, adverse drug reactions. High fiber diet  Follow-up pathology results  If anemia persists consider outpatient pill camera for further evaluation  Follow-up in 1 to 2 months with Gene BLANTON.     David Wright DO  8/14/2023  7:58 AM

## 2023-08-14 NOTE — DISCHARGE INSTRUCTIONS
Home Care Instructions for Gastroscopy and Colonoscopy with Sedation    Diet:  - Resume your regular diet as tolerated unless otherwise instructed. - Start with light meals to minimize bloating.  - Do not drink alcohol today. Medication:  - If you have questions about resuming your normal medications, please contact your Primary Care Physician. Activities:  - Take it easy today. Do not return to work today. - Do not drive today. - Do not operate any machinery today (including kitchen equipment). Gastroscopy:  - You may have a sore throat for 2-3 days following the exam. This is normal. Gargling with warm salt water (1/2 tsp salt to 1 glass warm water) or using throat lozenges will help. - If you experience any sharp pain in your neck, abdomen or chest, vomiting of blood, oral temperature over 100 degrees Fahrenheit, light-headedness or dizziness, or any other problems, contact your doctor. Colonoscopy:  -You may notice some rectal \"spotting\" (a little blood on the toilet tissue) for a day or two after the exam. This is normal.  -If you experience any rectal bleeding (not spotting), persistent tenderness or sharp severe abdominal pains, oral temperature over 100 degrees Fahrenheit, light-headedness or dizziness, or any other problems, contact your doctor. **If unable to reach your doctor, please go to the [facility] Emergency Room**    - Your referring physician will receive a full report of your examination.  - If you do not hear from your doctor's office within two weeks of your biopsy, please call them for your results.

## 2023-08-15 RX ORDER — AMLODIPINE BESYLATE 10 MG/1
TABLET ORAL
Qty: 90 TABLET | Refills: 0 | Status: SHIPPED | OUTPATIENT
Start: 2023-08-15

## 2023-08-15 NOTE — TELEPHONE ENCOUNTER
Appt 10/5/23 with [address]      Disp Refills Start End    AMLODIPINE 10 MG Oral Tab 90 tablet 0 4/24/2023     Sig: TAKE 1 TABLET BY MOUTH EVERY DAY    Sent to pharmacy as: amLODIPine Besylate 10 MG Oral Tablet (Norvasc)    E-Prescribing Status: Receipt confirmed by pharmacy (4/24/2023  8:53 AM CDT)      Pharmacy    [facility] [address], [address] [facility], [phone]

## 2023-09-01 ENCOUNTER — PATIENT MESSAGE (OUTPATIENT)
Dept: SURGERY | Facility: CLINIC | Age: 78
End: 2023-09-01

## 2023-09-05 RX ORDER — METFORMIN HYDROCHLORIDE 500 MG/1
TABLET, EXTENDED RELEASE ORAL
Qty: 360 TABLET | Refills: 1 | Status: SHIPPED | OUTPATIENT
Start: 2023-09-05

## 2023-09-05 NOTE — TELEPHONE ENCOUNTER
Requested Prescriptions     Pending Prescriptions Disp Refills    metFORMIN  MG Oral Tablet 24 Hr [Pharmacy Med Name: METFORMIN HCL  MG TABLET] 360 tablet 1     Sig: TAKE 2 TABLETS BY MOUTH TWICE A DAY     Your appointments       Date & Time Appointment Department [facility])    Sep 06, 2023  9:30 AM CDT Laboratory Visit with REF SO EDWARDIJEOMA [place], [place] (ED[facility] Ref Lab [address], [place])        Sep 15, 2023 11:00 AM CDT Follow Up Visit with Mikhail Medina MD [address] [facility], [address] [place] [address] ([address] [facility] 4)    Contact your primary care provider if your insurance requires a referral.    Please arrive 15 minutes prior to your scheduled appointment. Be sure to bring your current Insurance card, Photo ID, and medication bottles or a list of your current medications. A 24 hour notice is required to cancel any appointment or you may be charged a $40 No Show Fee. Important: 24 hour notice is required to cancel any appointment or you may be charged a $40 No Show Fee. Please notify your physician office. Sep 26, 2023  1:40 PM CDT Follow-Up OV with Zena Bravo [facility] ([facility])    Please arrive 15 minutes prior to your scheduled appointment time.          Oct 05, 2023 10:20 AM CDT Medicare Annual Well Visit with Edison Castrejon MD [address] [place] [address] ([facility] IM/FM [place])              [place]  EDW Ref Lab [address], [place]  [address] [facility] [address] [place] [address]  [facility] IM/FM [address], [address] [place], [place] [address]  [facility] 4  [address] [place] [address] [facility],  [address] [facility] [address]  [phone]          Last A1c value was 6.7% done 2/3/2023.     Refill 3/15/23  LOV 3/15/23    Marianne BROWER, [facility] sent

## 2023-09-05 NOTE — TELEPHONE ENCOUNTER
Future Appointments   Date Time Provider Mini Barber   9/6/2023  9:30 AM REF SO PFLD REF EMG17 Ref PFLD 17   9/15/2023 11:00 AM Jani Root MD [facility] EC Nap 4   9/26/2023  1:40 PM Zaira Bravo APRN SGINP ECC SUB GI   10/5/2023 10:20 AM Michelle Hull MD EMG 35 75[facility] EMG 75TH   12/6/2023  8:00 AM Soco Obrien APRN EMGDIABTBBK EMG [place]

## 2023-09-06 ENCOUNTER — LAB ENCOUNTER (OUTPATIENT)
Dept: LAB | Age: 78
End: 2023-09-06
Attending: INTERNAL MEDICINE
Payer: MEDICARE

## 2023-09-06 DIAGNOSIS — E11.29 TYPE 2 DIABETES MELLITUS WITH MICROALBUMINURIA, WITHOUT LONG-TERM CURRENT USE OF INSULIN: ICD-10-CM

## 2023-09-06 DIAGNOSIS — E78.5 DYSLIPIDEMIA ASSOCIATED WITH TYPE 2 DIABETES MELLITUS: ICD-10-CM

## 2023-09-06 DIAGNOSIS — R80.9 TYPE 2 DIABETES MELLITUS WITH MICROALBUMINURIA, WITHOUT LONG-TERM CURRENT USE OF INSULIN: ICD-10-CM

## 2023-09-06 DIAGNOSIS — E11.69 DYSLIPIDEMIA ASSOCIATED WITH TYPE 2 DIABETES MELLITUS: ICD-10-CM

## 2023-09-06 LAB
ALBUMIN SERPL-MCNC: 4.3 G/DL (ref 3.4–5)
ALBUMIN/GLOB SERPL: 1.5 {RATIO} (ref 1–2)
ALP LIVER SERPL-CCNC: 56 U/L
ALT SERPL-CCNC: 31 U/L
ANION GAP SERPL CALC-SCNC: 9 MMOL/L (ref 0–18)
AST SERPL-CCNC: 13 U/L (ref 15–37)
BILIRUB SERPL-MCNC: 0.5 MG/DL (ref 0.1–2)
BUN BLD-MCNC: 21 MG/DL (ref 7–18)
CALCIUM BLD-MCNC: 9.7 MG/DL (ref 8.5–10.1)
CHLORIDE SERPL-SCNC: 101 MMOL/L (ref 98–112)
CHOLEST SERPL-MCNC: 144 MG/DL (ref ?–200)
CO2 SERPL-SCNC: 26 MMOL/L (ref 21–32)
CREAT BLD-MCNC: 1.17 MG/DL
EGFRCR SERPLBLD CKD-EPI 2021: 65 ML/MIN/1.73M2 (ref 60–?)
EST. AVERAGE GLUCOSE BLD GHB EST-MCNC: 163 MG/DL (ref 68–126)
FASTING PATIENT LIPID ANSWER: YES
FASTING STATUS PATIENT QL REPORTED: YES
GLOBULIN PLAS-MCNC: 2.8 G/DL (ref 2.8–4.4)
GLUCOSE BLD-MCNC: 185 MG/DL (ref 70–99)
HBA1C MFR BLD: 7.3 % (ref ?–5.7)
HDLC SERPL-MCNC: 38 MG/DL (ref 40–59)
LDLC SERPL CALC-MCNC: 64 MG/DL (ref ?–100)
NONHDLC SERPL-MCNC: 106 MG/DL (ref ?–130)
OSMOLALITY SERPL CALC.SUM OF ELEC: 290 MOSM/KG (ref 275–295)
POTASSIUM SERPL-SCNC: 4 MMOL/L (ref 3.5–5.1)
PROT SERPL-MCNC: 7.1 G/DL (ref 6.4–8.2)
SODIUM SERPL-SCNC: 136 MMOL/L (ref 136–145)
TRIGL SERPL-MCNC: 259 MG/DL (ref 30–149)
VLDLC SERPL CALC-MCNC: 39 MG/DL (ref 0–30)

## 2023-09-06 PROCEDURE — 80061 LIPID PANEL: CPT

## 2023-09-06 PROCEDURE — 83036 HEMOGLOBIN GLYCOSYLATED A1C: CPT

## 2023-09-06 PROCEDURE — 36415 COLL VENOUS BLD VENIPUNCTURE: CPT

## 2023-09-06 PROCEDURE — 80053 COMPREHEN METABOLIC PANEL: CPT

## 2023-09-12 RX ORDER — BLOOD SUGAR DIAGNOSTIC
STRIP MISCELLANEOUS
Qty: 100 STRIP | Refills: 2 | OUTPATIENT
Start: 2023-09-12

## 2023-09-15 ENCOUNTER — LAB ENCOUNTER (OUTPATIENT)
Dept: LAB | Age: 78
End: 2023-09-15
Attending: UROLOGY
Payer: MEDICARE

## 2023-09-15 ENCOUNTER — PROCEDURE (OUTPATIENT)
Dept: SURGERY | Facility: CLINIC | Age: 78
End: 2023-09-15

## 2023-09-15 VITALS — DIASTOLIC BLOOD PRESSURE: 73 MMHG | SYSTOLIC BLOOD PRESSURE: 126 MMHG

## 2023-09-15 DIAGNOSIS — C67.9 MALIGNANT NEOPLASM OF URINARY BLADDER, UNSPECIFIED SITE (HCC): Primary | ICD-10-CM

## 2023-09-15 DIAGNOSIS — R97.20 ELEVATED PSA: ICD-10-CM

## 2023-09-15 DIAGNOSIS — N52.9 ERECTILE DYSFUNCTION, UNSPECIFIED ERECTILE DYSFUNCTION TYPE: ICD-10-CM

## 2023-09-15 DIAGNOSIS — R35.0 URINARY FREQUENCY: ICD-10-CM

## 2023-09-15 DIAGNOSIS — N13.8 BPH WITH OBSTRUCTION/LOWER URINARY TRACT SYMPTOMS: ICD-10-CM

## 2023-09-15 DIAGNOSIS — N40.1 BPH WITH OBSTRUCTION/LOWER URINARY TRACT SYMPTOMS: ICD-10-CM

## 2023-09-15 LAB
APPEARANCE: CLEAR
BILIRUBIN: NEGATIVE
GLUCOSE (URINE DIPSTICK): NEGATIVE MG/DL
KETONES (URINE DIPSTICK): NEGATIVE MG/DL
LEUKOCYTES: NEGATIVE
MULTISTIX LOT#: NORMAL NUMERIC
NITRITE, URINE: NEGATIVE
OCCULT BLOOD: NEGATIVE
PH, URINE: 6 (ref 4.5–8)
PROTEIN (URINE DIPSTICK): NEGATIVE MG/DL
PSA SERPL-MCNC: 3.3 NG/ML (ref ?–4)
SPECIFIC GRAVITY: 1.01 (ref 1–1.03)
URINE-COLOR: YELLOW
UROBILINOGEN,SEMI-QN: 0.2 MG/DL (ref 0–1.9)

## 2023-09-15 PROCEDURE — 81003 URINALYSIS AUTO W/O SCOPE: CPT | Performed by: UROLOGY

## 2023-09-15 PROCEDURE — 36415 COLL VENOUS BLD VENIPUNCTURE: CPT

## 2023-09-15 PROCEDURE — 84153 ASSAY OF PSA TOTAL: CPT

## 2023-09-15 PROCEDURE — 52000 CYSTOURETHROSCOPY: CPT | Performed by: UROLOGY

## 2023-09-15 PROCEDURE — 99214 OFFICE O/P EST MOD 30 MIN: CPT | Performed by: UROLOGY

## 2023-09-15 RX ORDER — CIPROFLOXACIN 500 MG/1
500 TABLET, FILM COATED ORAL ONCE
Status: COMPLETED | OUTPATIENT
Start: 2023-09-15 | End: 2023-09-15

## 2023-09-15 RX ORDER — PREDNISONE 50 MG/1
TABLET ORAL
Qty: 3 TABLET | Refills: 0 | Status: SHIPPED | OUTPATIENT
Start: 2023-09-15

## 2023-09-15 RX ADMIN — CIPROFLOXACIN 500 MG: 500 TABLET, FILM COATED ORAL at 10:19:00

## 2023-09-18 LAB — NON GYNE INTERPRETATION: NEGATIVE

## 2023-10-02 ENCOUNTER — LAB ENCOUNTER (OUTPATIENT)
Dept: LAB | Age: 78
End: 2023-10-02
Attending: FAMILY MEDICINE
Payer: MEDICARE

## 2023-10-02 DIAGNOSIS — D50.9 IRON DEFICIENCY ANEMIA, UNSPECIFIED IRON DEFICIENCY ANEMIA TYPE: ICD-10-CM

## 2023-10-02 LAB
BASOPHILS # BLD AUTO: 0.08 X10(3) UL (ref 0–0.2)
BASOPHILS NFR BLD AUTO: 1 %
DEPRECATED HBV CORE AB SER IA-ACNC: 474.9 NG/ML
EOSINOPHIL # BLD AUTO: 0.14 X10(3) UL (ref 0–0.7)
EOSINOPHIL NFR BLD AUTO: 1.7 %
ERYTHROCYTE [DISTWIDTH] IN BLOOD BY AUTOMATED COUNT: 13.4 %
HAPTOGLOB SERPL-MCNC: 190 MG/DL (ref 30–200)
HCT VFR BLD AUTO: 36.9 %
HGB BLD-MCNC: 12.7 G/DL
HGB RETIC QN AUTO: 36.4 PG (ref 28.2–36.6)
IMM GRANULOCYTES # BLD AUTO: 0.05 X10(3) UL (ref 0–1)
IMM GRANULOCYTES NFR BLD: 0.6 %
IMM RETICS NFR: 0.2 RATIO (ref 0.1–0.3)
IRON SATN MFR SERPL: 20 %
IRON SERPL-MCNC: 63 UG/DL
LDH SERPL L TO P-CCNC: 169 U/L
LYMPHOCYTES # BLD AUTO: 2.83 X10(3) UL (ref 1–4)
LYMPHOCYTES NFR BLD AUTO: 34.8 %
MCH RBC QN AUTO: 31.3 PG (ref 26–34)
MCHC RBC AUTO-ENTMCNC: 34.4 G/DL (ref 31–37)
MCV RBC AUTO: 90.9 FL
MONOCYTES # BLD AUTO: 0.7 X10(3) UL (ref 0.1–1)
MONOCYTES NFR BLD AUTO: 8.6 %
NEUTROPHILS # BLD AUTO: 4.33 X10 (3) UL (ref 1.5–7.7)
NEUTROPHILS # BLD AUTO: 4.33 X10(3) UL (ref 1.5–7.7)
NEUTROPHILS NFR BLD AUTO: 53.3 %
PLATELET # BLD AUTO: 248 10(3)UL (ref 150–450)
RBC # BLD AUTO: 4.06 X10(6)UL
RETICS # AUTO: 100.3 X10(3) UL (ref 22.5–147.5)
RETICS/RBC NFR AUTO: 2.5 %
TIBC SERPL-MCNC: 313 UG/DL (ref 240–450)
TRANSFERRIN SERPL-MCNC: 210 MG/DL (ref 200–360)
WBC # BLD AUTO: 8.1 X10(3) UL (ref 4–11)

## 2023-10-02 PROCEDURE — 83550 IRON BINDING TEST: CPT

## 2023-10-02 PROCEDURE — 85045 AUTOMATED RETICULOCYTE COUNT: CPT

## 2023-10-02 PROCEDURE — 83540 ASSAY OF IRON: CPT

## 2023-10-02 PROCEDURE — 83010 ASSAY OF HAPTOGLOBIN QUANT: CPT

## 2023-10-02 PROCEDURE — 82728 ASSAY OF FERRITIN: CPT

## 2023-10-02 PROCEDURE — 36415 COLL VENOUS BLD VENIPUNCTURE: CPT

## 2023-10-02 PROCEDURE — 85025 COMPLETE CBC W/AUTO DIFF WBC: CPT

## 2023-10-02 PROCEDURE — 83615 LACTATE (LD) (LDH) ENZYME: CPT

## 2023-10-04 ENCOUNTER — OFFICE VISIT (OUTPATIENT)
Dept: ENDOCRINOLOGY CLINIC | Facility: CLINIC | Age: 78
End: 2023-10-04
Payer: MEDICARE

## 2023-10-04 VITALS
WEIGHT: 228.63 LBS | OXYGEN SATURATION: 96 % | RESPIRATION RATE: 17 BRPM | DIASTOLIC BLOOD PRESSURE: 58 MMHG | SYSTOLIC BLOOD PRESSURE: 126 MMHG | BODY MASS INDEX: 35 KG/M2 | HEART RATE: 79 BPM

## 2023-10-04 DIAGNOSIS — N28.9 NEPHROPATHY: ICD-10-CM

## 2023-10-04 DIAGNOSIS — R80.9 TYPE 2 DIABETES MELLITUS WITH MICROALBUMINURIA, WITHOUT LONG-TERM CURRENT USE OF INSULIN: Primary | ICD-10-CM

## 2023-10-04 DIAGNOSIS — E11.29 TYPE 2 DIABETES MELLITUS WITH MICROALBUMINURIA, WITHOUT LONG-TERM CURRENT USE OF INSULIN: Primary | ICD-10-CM

## 2023-10-04 DIAGNOSIS — I10 ESSENTIAL HYPERTENSION: ICD-10-CM

## 2023-10-04 DIAGNOSIS — E66.9 OBESITY (BMI 30-39.9): ICD-10-CM

## 2023-10-04 DIAGNOSIS — E78.5 DYSLIPIDEMIA: ICD-10-CM

## 2023-10-04 PROCEDURE — 99214 OFFICE O/P EST MOD 30 MIN: CPT | Performed by: NURSE PRACTITIONER

## 2023-10-05 ENCOUNTER — OFFICE VISIT (OUTPATIENT)
Dept: INTERNAL MEDICINE CLINIC | Facility: CLINIC | Age: 78
End: 2023-10-05
Payer: MEDICARE

## 2023-10-05 VITALS
DIASTOLIC BLOOD PRESSURE: 60 MMHG | OXYGEN SATURATION: 98 % | HEART RATE: 91 BPM | SYSTOLIC BLOOD PRESSURE: 118 MMHG | WEIGHT: 226.81 LBS | BODY MASS INDEX: 35.6 KG/M2 | HEIGHT: 66.93 IN

## 2023-10-05 DIAGNOSIS — J44.9 CHRONIC OBSTRUCTIVE PULMONARY DISEASE, UNSPECIFIED COPD TYPE (HCC): Chronic | ICD-10-CM

## 2023-10-05 DIAGNOSIS — Z86.711 HISTORY OF PULMONARY EMBOLUS (PE): ICD-10-CM

## 2023-10-05 DIAGNOSIS — Z00.00 ENCOUNTER FOR ANNUAL HEALTH EXAMINATION: Primary | ICD-10-CM

## 2023-10-05 DIAGNOSIS — J30.1 ALLERGIC RHINITIS DUE TO POLLEN, UNSPECIFIED SEASONALITY: ICD-10-CM

## 2023-10-05 DIAGNOSIS — R80.9 TYPE 2 DIABETES MELLITUS WITH MICROALBUMINURIA, WITHOUT LONG-TERM CURRENT USE OF INSULIN: ICD-10-CM

## 2023-10-05 DIAGNOSIS — N40.1 BENIGN PROSTATIC HYPERPLASIA WITH URINARY OBSTRUCTION: ICD-10-CM

## 2023-10-05 DIAGNOSIS — M06.4 INFLAMMATORY POLYARTHRITIS (HCC): ICD-10-CM

## 2023-10-05 DIAGNOSIS — Z86.718 HISTORY OF DVT IN ADULTHOOD: ICD-10-CM

## 2023-10-05 DIAGNOSIS — E11.29 TYPE 2 DIABETES MELLITUS WITH MICROALBUMINURIA, WITHOUT LONG-TERM CURRENT USE OF INSULIN: ICD-10-CM

## 2023-10-05 DIAGNOSIS — R97.20 ELEVATED PSA: ICD-10-CM

## 2023-10-05 DIAGNOSIS — E11.69 DYSLIPIDEMIA ASSOCIATED WITH TYPE 2 DIABETES MELLITUS: ICD-10-CM

## 2023-10-05 DIAGNOSIS — I10 ESSENTIAL HYPERTENSION: ICD-10-CM

## 2023-10-05 DIAGNOSIS — D59.10 AUTOIMMUNE HEMOLYTIC ANEMIA (HCC): ICD-10-CM

## 2023-10-05 DIAGNOSIS — C67.9 MALIGNANT NEOPLASM OF URINARY BLADDER, UNSPECIFIED SITE (HCC): ICD-10-CM

## 2023-10-05 DIAGNOSIS — E78.5 DYSLIPIDEMIA ASSOCIATED WITH TYPE 2 DIABETES MELLITUS: ICD-10-CM

## 2023-10-05 DIAGNOSIS — D80.1 HYPOGAMMAGLOBULINEMIA (HCC): ICD-10-CM

## 2023-10-05 DIAGNOSIS — N13.8 BENIGN PROSTATIC HYPERPLASIA WITH URINARY OBSTRUCTION: ICD-10-CM

## 2023-10-05 DIAGNOSIS — K21.00 GASTROESOPHAGEAL REFLUX DISEASE WITH ESOPHAGITIS WITHOUT HEMORRHAGE: Chronic | ICD-10-CM

## 2023-10-05 DIAGNOSIS — E55.9 VITAMIN D DEFICIENCY: ICD-10-CM

## 2023-10-05 DIAGNOSIS — G47.33 OSA TREATED WITH BIPAP: ICD-10-CM

## 2023-10-05 DIAGNOSIS — D50.9 IRON DEFICIENCY ANEMIA, UNSPECIFIED IRON DEFICIENCY ANEMIA TYPE: ICD-10-CM

## 2023-10-05 DIAGNOSIS — Z96.653 HISTORY OF BILATERAL KNEE REPLACEMENT: ICD-10-CM

## 2023-10-05 PROBLEM — Z96.652 S/P TKR (TOTAL KNEE REPLACEMENT), LEFT: Status: RESOLVED | Noted: 2022-09-15 | Resolved: 2023-10-05

## 2023-10-05 PROCEDURE — 1125F AMNT PAIN NOTED PAIN PRSNT: CPT | Performed by: FAMILY MEDICINE

## 2023-10-05 PROCEDURE — 99214 OFFICE O/P EST MOD 30 MIN: CPT | Performed by: FAMILY MEDICINE

## 2023-10-05 PROCEDURE — G0439 PPPS, SUBSEQ VISIT: HCPCS | Performed by: FAMILY MEDICINE

## 2023-10-09 RX ORDER — LOSARTAN POTASSIUM AND HYDROCHLOROTHIAZIDE 12.5; 1 MG/1; MG/1
1 TABLET ORAL DAILY
Qty: 90 TABLET | Refills: 1 | Status: SHIPPED | OUTPATIENT
Start: 2023-10-09

## 2023-10-09 NOTE — TELEPHONE ENCOUNTER
Hypertension Medications Protocol Ziqzdc59/09/2023 01:03 AM   Protocol Details CMP or BMP in past 12 months    Last serum creatinine< 2.0    Appointment in past 6 or next 3 months          Last visit 10/5/23 with [facility]     Essential hypertension  Controlled on current treatment.

## 2023-11-12 DIAGNOSIS — E11.69 DYSLIPIDEMIA ASSOCIATED WITH TYPE 2 DIABETES MELLITUS: ICD-10-CM

## 2023-11-12 DIAGNOSIS — E78.5 DYSLIPIDEMIA ASSOCIATED WITH TYPE 2 DIABETES MELLITUS: ICD-10-CM

## 2023-11-14 RX ORDER — ROSUVASTATIN CALCIUM 10 MG/1
TABLET, COATED ORAL
Qty: 90 TABLET | Refills: 0 | Status: SHIPPED | OUTPATIENT
Start: 2023-11-14

## 2023-11-14 RX ORDER — AMLODIPINE BESYLATE 10 MG/1
TABLET ORAL
Qty: 90 TABLET | Refills: 0 | Status: SHIPPED | OUTPATIENT
Start: 2023-11-14

## 2023-12-18 RX ORDER — DAPAGLIFLOZIN 10 MG/1
10 TABLET, FILM COATED ORAL DAILY
Qty: 30 TABLET | Refills: 0 | OUTPATIENT
Start: 2023-12-18

## 2023-12-18 NOTE — TELEPHONE ENCOUNTER
Requested Prescriptions     Pending Prescriptions Disp Refills    FARXIGA 10 MG Oral Tab [Pharmacy Med Name: Charlie Blanco 10 MG TABLET] 30 tablet 0     Sig: TAKE 1 TABLET BY MOUTH EVERY DAY     Your appointments       Date & Time Appointment Department (Center)    Dec 27, 2023  9:00 AM CST CT EXAM with PF CT 1100 [facility])    Please arrive 15 minutes prior to your scheduled appointment time.         Jan 12, 2024 10:30 AM CST Follow Up Visit with Dominguez Wade [facility] [address])        Apr 08, 2024 11:30 AM CDT Follow up - Extended with Kana Quintanilla MD [address] [facility],[address], [facility] ([facility]/ [facility])              [address] 75th [address]  [facility] IM/ [address] [facility] [address] [facility] [address] [address] [place] [address] [phone]          HGBA1C:    Lab Results   Component Value Date    A1C 7.3 (H) 09/06/2023    A1C 6.7 02/03/2023    A1C 6.9 (H) 12/09/2022     (H) 09/06/2023     LOV: 10-4-23    REFILL:

## 2023-12-22 DIAGNOSIS — E11.69 DYSLIPIDEMIA ASSOCIATED WITH TYPE 2 DIABETES MELLITUS  (HCC): ICD-10-CM

## 2023-12-22 DIAGNOSIS — E78.5 DYSLIPIDEMIA ASSOCIATED WITH TYPE 2 DIABETES MELLITUS  (HCC): ICD-10-CM

## 2023-12-22 RX ORDER — ROSUVASTATIN CALCIUM 10 MG/1
TABLET, COATED ORAL
Qty: 90 TABLET | Refills: 0 | Status: SHIPPED | OUTPATIENT
Start: 2023-12-22

## 2023-12-22 NOTE — TELEPHONE ENCOUNTER
Last VISIT:10/05/2023 MD JASON    Last CPE:10/05/2023    Last REFILL:11/14/2023   Medication Quantity Refills Start End   rosuvastatin 10 MG Oral Tab 90 tablet 0         Last LABS:09/06/2023    Future Appointments   Date Time Provider Mini Elisabeth   12/27/2023  9:00 AM PF CT RM1 PF CT [place]   1/12/2024 10:30 AM Keli Farah MD [facility] EC Nap 4   4/3/2024  1:45 PM HAYDEE Lrakin EMGDIABTBBK EM[facility] [place]   4/8/2024 11:30 AM Lucille Harman MD EM[facility] 75[address] EMG 75TH         Per PROTOCOL? Passed Protocol

## 2023-12-22 NOTE — TELEPHONE ENCOUNTER
Requested Prescriptions     Pending Prescriptions Disp Refills    NOVOLOG FLEXPEN 100 UNIT/ML Subcutaneous Solution Pen-injector [Pharmacy Med Name: Alexa Diaz 100 UNIT/ML FLEXPEN]  0     Sig: USES UP TO 15 UNITS DAILY AS DIRECTED IN DIVIDED DOSES    ONETOUCH ULTRA In Vitro Strip [place] Med Name: ONE TOUCH ULTRA BLUE TEST STRP] 100 strip 2     Sig: ONE STRIP FOR [address] [place]     Your appointments       Date & Time Appointment Department (Center)    Dec 27, 2023  9:00 AM CST CT EXAM with PF CT 1100 [facility])    Please arrive 15 minutes prior to your scheduled appointment time. Jan 12, 2024 10:30 AM CST Follow Up Visit with Ana Cristina Doyle MD [address] [facility] 4)        Apr 08, 2024 11:30 AM CDT Follow up - Extended with Nakul English MD [address] [facility],Suite [facility], [facility] ([facility] IM/ [facility])              [facility] [address] [place] [address] [phone] [facility]  [facility] IM/FM [place] [address] [place] [address]  [place] [address] [phone]          Last A1c value was 7.3% done 9/6/2023.     Refill 12/11/23,   LOV 10/04/23  No FU

## 2023-12-27 ENCOUNTER — HOSPITAL ENCOUNTER (OUTPATIENT)
Dept: CT IMAGING | Age: 78
Discharge: HOME | End: 2023-12-27
Attending: UROLOGY
Payer: MEDICARE

## 2023-12-27 DIAGNOSIS — C67.9 MALIGNANT NEOPLASM OF URINARY BLADDER, UNSPECIFIED SITE (HCC): ICD-10-CM

## 2023-12-27 PROCEDURE — 82565 ASSAY OF CREATININE: CPT

## 2023-12-27 PROCEDURE — 74178 CT ABD&PLV WO CNTR FLWD CNTR: CPT | Performed by: UROLOGY

## 2023-12-27 PROCEDURE — 76377 3D RENDER W/INTRP POSTPROCES: CPT | Performed by: UROLOGY

## 2023-12-27 RX ORDER — BLOOD SUGAR DIAGNOSTIC
STRIP MISCELLANEOUS
Qty: 100 STRIP | Refills: 2 | OUTPATIENT
Start: 2023-12-27

## 2023-12-27 RX ORDER — INSULIN ASPART 100 [IU]/ML
INJECTION, SOLUTION INTRAVENOUS; SUBCUTANEOUS
Qty: 15 ML | Refills: 0 | Status: SHIPPED | OUTPATIENT
Start: 2023-12-27 | End: 2023-12-27

## 2023-12-27 RX ORDER — BLOOD-GLUCOSE METER
1 EACH MISCELLANEOUS ONCE
Qty: 1 KIT | Refills: 0 | Status: SHIPPED | OUTPATIENT
Start: 2023-12-27 | End: 2023-12-28

## 2023-12-27 RX ORDER — LANCETS 33 GAUGE
1 EACH MISCELLANEOUS 3 TIMES DAILY
Qty: 300 EACH | Refills: 0 | Status: SHIPPED | OUTPATIENT
Start: 2023-12-27 | End: 2023-12-28

## 2023-12-27 RX ORDER — BLOOD SUGAR DIAGNOSTIC
1 STRIP MISCELLANEOUS 3 TIMES DAILY
Qty: 300 STRIP | Refills: 1 | Status: SHIPPED | OUTPATIENT
Start: 2023-12-27 | End: 2023-12-28

## 2023-12-27 NOTE — TELEPHONE ENCOUNTER
Please clarify which meter he is using as these strips were previously discontinued. Also pt was on insulin while on steroids - is he still using novolog?

## 2023-12-27 NOTE — TELEPHONE ENCOUNTER
Contacted pt states those are his test strips will need to send in new meter and test stips pt does not have any other meter.      Please refuse ultra 2 test strips pended verio for pt medicare covers any meter

## 2023-12-27 NOTE — TELEPHONE ENCOUNTER
[facility] - not [facility] [place] [facility]. Please change to dosage so they will refill. See note from pt - Please clarify if he needs refill now how much insulin has he been taking?

## 2023-12-27 NOTE — PROGRESS NOTES
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 Elevated PSA  - TRUS Bx 10/21/20: (~ 70 g prostate), acute and chronic inflammation in several cores  - Confirm MDx 15% chance >/=G7 RA and RLA and 35% chance any CaP  - pMRI 7/16/21: 61 g prostate, PiRads 3 lesion  3. ED  - 100 mg viagra, now 20 mg cialis prn ([facility])  4. BPH with nocturia  - no meds     PCP - Dony QURESHI 9/15/2023    Presents for check-up with cysto and review CT showing possible new RMP exophytic mass. Had rash following IV contrast despite pre-medication.    He feels well today. Urinary stream is strong. He completed 3 weeks BCG in summer 2023. Denies hematuria, dysuria.    AUA SS is 2/35 with 2/5 n. Delighted with LUTS and declines meds for BPH at this time.  Incontinence: none but occasional close calls  RALPH prior visit: >40-50 g prostate, no nodules.    ~ 10% potency for masturbation and prefers observation. He tried 20 mg cialis which somewhat helped but caused dizziness. Tried viagra once but stopped d/t Ophtho recs for cataract surgery. Not interested in trimix teaching.    Prior PSAs:  - 3.30 9/15/23  - 2.97 1/17/23  - 4.02 8/1/22  - 10.70 12/13/21  - 10.30 6/30/21 (completed 4 weeks abx)  - 11.70 5/19/21  - 7.19 8/31/20 with 35% 4K score  - 3.29 10/8/19  - 3.46 4/18/18  - < 2.5 in years prior    CTU 12/27/23: new 17 x 19 mm enhancig RMP exophytic  mass c/f RCC  CTU 2/21/22: normal  CTU 7/24/21: bladder mass, no adenopathy, BPH  pMRI 7/16/21: 61 g prostate, 1.5 x 0.8 cm right posterior lateral mid gland nodule and overall prostate assessment is PiRads 3 as well as polypoid bladder mass noted measuring up to 4.3 cm.    Drinks 60-80 oz water per day.    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    We reviewed the NCCN guidelines for options for the small renal mass, including active surveillance (AS), RFA or partial/radical nephrectomy.  We discussed the risks and benefits to AS and the typical surveillance protocol, which includes:  - CMP annually up to 5 y from dx  - Abdominal CT/MRI within 6 mo of initiating AS, then at least annually  - CXR or CT at baseline and annually as clinically indicated to assess for pulmonary metastases  - The patient is aware of the low risk of metastases while on AS. Most data suggests this is ~ 2%.  - The patient is aware of the less than 30% chance that intervention may be required eventually if AS is pursued.  - Renal biopsy is an optional component of AS.    We also discussed the risks and benefits to RFA and partial or radical nephrectomy and the typical recovery and follow-up as well as possible complications to both options.    We discussed that recent literature suggests similar oncologic outcomes between both RFA and partial nephrectomy for small renal masses and also shows that RFA has been associated with a smaller change in renal function and lower blood loss. We discussed that both partial nephrectomy and RFA are a/w with ~ 2  % chance of tumor progression and a >95% 5 y MFS.    We discussed that given the allergic reaction to IV contrast despite premedication it may be wise to consider RFA and he would like to proceed with this.    Have discussed option to continue 3 w maintenance BCG and he is amenable to this at 3, 6, 12 mo and consider q 6 mo for 2 y or just stop at 1 y. He would prefer not to continue BCG for now.    After discussing options the patient would prefer IR consult for RFA of the right renal mass (IV premedication ordered). He declines further BCG for now primarily d/t driving distance.  Continue q 6 mo PSA checks for elevated PSA. Observation for BPH/LUTS. Observation for ED. Cytology today. Declines BCG. F/u in 6 mo with cysto and MR abdomen prior.    I spent over 40 minutes in consultation and coordination of this patient's care today including review of pertinent labs, imaging, records with > 50% of time spent counseling - not including time spent performing cystoscopy.      PROCEDURE NOTE    PROCEDURE PERFORMED: Flexible Cystoscopy    After informed consent and urinalysis was obtained, he was placed in the supine position and prepped and draped in the usual sterile fashion using Betadine. Local anesthesia was induced by the introduction of 2% Lidocaine jelly per urethra.  A 16 Welsh flexible scope was passed through the anterior urethra, the posterior urethra and prostate were negotiated and the bladder was entered. The entirety of the bladder was examined and the scope was retroflexed to examine the bladder neck.     Findings: as noted above    The patient tolerated the procedure well, suffered no complications, was able to void spontaneously after completion of the procedure in the office, and left the office in good condition.    Marva-procedural antibiotics were given.  _________________________________    Prior note:  Cysto today: s/p partial TURP, well healed scar near right UO, new right ureteral orifice  present and normal. Two small (~ 3-5 mm) papillary tumors on anterior wall of bladder - one near bladder neck    Prior note:  We again discussed that HGTa bladder cancer > 3 cm with focal T1 puts the patient in the high risk group for NMIBC.     Alternatively we have discussed cystectomy as an option for high-grade focal T1 bladder cancer and the risks and benefits to this as well as possible complications and typical recovery course. He wants to avoid this if possible.    HISTORY:  Past Medical History:   Diagnosis Date    Acute venous embolism and thrombosis of unspecified deep vessels of lower extremity     Allergic rhinitis 2011    Arthritis 2015    Asthma 2011    Bladder cancer (HCC)     BLOOD CLOTS     Blood disorder     Cancer (HCC) 2021    bladder    Cataract     COPD (chronic obstructive pulmonary disease) (HCC) 2012    not using oxygen    Dermatitis due to drugs and medicines taken internally(693.0)     Diabetes (HCC) 11/2014    Diabetes mellitus (HCC)     Edema     Elevated blood pressure reading without diagnosis of hypertension     Esophageal reflux     Extrinsic asthma, unspecified     Food intolerance Bloat    Hearing impairment     tinnitus    Hearing loss     Heartburn 2012    Hemolytic anemia (HCC)     Hepatitis, unspecified     age 20 yr; no s/s time of diagnosis; no treatment; no current symptoms for 50 years    Hereditary hemolytic anemia, unspecified (HCC)     High blood pressure     High cholesterol     History of blood transfusion     no reactions    HOSPITALIZATIONS     PCP pneumonia and ALI    HYPERLIPIDEMIA     Infectious mononucleosis     Jaundice 2010    Hemolytic Anemia    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    OBESITY     Otalgia, unspecified     Other and unspecified hyperlipidemia     OTHER DISEASES     autoimmune hemolytic pneumonia    OTHER DISEASES     PCP pneumonia    OTHER DISEASES     PE    OTHER DISEASES     eczema    Other specified disorders of pancreatic internal secretion      Personal history of antineoplastic chemotherapy 03/03/2022    gemcitabine instillation    Personal history of pneumonia (recurrent)     Personal history of venous thrombosis and embolism     Plantar fasciitis     Pneumonia, organism unspecified(486)     PONV (postoperative nausea and vomiting)     nausea    Postnasal drip     Psoriasis     PULMONARY EMBOLISM     Sleep apnea, obstructive EDW  DX   5-06-11/ TX 7-15-11    AHI  36.4/ supine  115/ krysten  80%/ BIPAP 13/9 / Sleep  RX     Unspecified essential hypertension     Unspecified sleep apnea     AHI 36, O2 krysten of 80%, CPAP    Unspecified tinnitus     Visual impairment       Past Surgical History:   Procedure Laterality Date    BRONCHOSCOPY,BIOPSY  02/2011    CATARACT      10 and 11/2021    COLONOSCOPY      x 2 at age 52 and 57    COLONOSCOPY  02/09/2014    Procedure: COLONOSCOPY;  Surgeon: Malcolm Mcadams MD;  Location:  ENDOSCOPY    COLONOSCOPY  08/2014    and EGD    COLONOSCOPY N/A 8/14/2023    Procedure: COLONOSCOPY with cold snare polypectomy  ESOPHAGOGASTRODUODENOSCOPY;  Surgeon: Moy Amaya DO;  Location:  ENDOSCOPY    KNEE REPLACEMENT SURGERY  Left Knee 08/2022    Right knee 02-14-23    SIGMOIDOSCOPY,DIAGNOSTIC      SKIN SURGERY  2009    tags, moles    TOTAL KNEE REPLACEMENT Bilateral     VASECTOMY  1992      Family History   Problem Relation Age of Onset    Other (Other) Mother         hepatitis b/DJD    Alcohol and Other Disorders Associated Father     Asthma Sister     Other (Other) Sister         osteoarthritis    Ovarian Cancer Sister     Diabetes Brother     Hypertension Brother     Stroke Brother     Mental Disorder Brother         DD    Stroke Brother     Alcohol and Other Disorders Associated Son     Hypertension Brother       Social History:   Social History     Socioeconomic History    Marital status:    Tobacco Use    Smoking status: Former     Packs/day: 1.50     Years: 40.00     Additional pack years: 0.00     Total  pack years: 60.00     Types: Cigarettes, Pipe, Cigars     Start date: 1962     Quit date: 1/3/2008     Years since quittin.0    Smokeless tobacco: Never   Vaping Use    Vaping Use: Never used   Substance and Sexual Activity    Alcohol use: Not Currently     Comment: Quit     Drug use: Never    Sexual activity: Never   Other Topics Concern    Caffeine Concern No    Stress Concern No    Weight Concern Yes    Special Diet No    Exercise Yes    Seat Belt Yes        Medications (Active prior to today's visit):  Current Outpatient Medications   Medication Sig Dispense Refill    predniSONE 50 MG Oral Tab Take one tablet (50mg) by mouth 13 hours, 7 hours, and 1 hour prior to examination as directed. 3 tablet 0    diphenhydrAMINE HCl 50 MG Oral Tab Take one (1) 50mg tablet by mouth one (1) hour before the examination. 1 tablet 0    insulin aspart (NOVOLOG FLEXPEN) 100 Units/mL Subcutaneous Solution Pen-injector Uses up to 45 units daily as directed in divided doses 15 mL 1    Glucose Blood (ONETOUCH VERIO) In Vitro Strip 1 each in the morning, at noon, and at bedtime. Use as directed. 300 strip 1    OneTouch Delica Lancets 33G Does not apply Misc 1 Lancet by Finger stick route in the morning, at noon, and at bedtime. 300 each 0    ROSUVASTATIN 10 MG Oral Tab TAKE 1 TABLET BY MOUTH EVERY DAY AT NIGHT 90 tablet 0    BD PEN NEEDLE WILBER 2ND GEN 32G X 4 MM Does not apply Misc 3 injections daily 50 each 3    predniSONE 50 MG Oral Tab Take 1 tablet (50 mg total) by mouth See Admin Instructions. Take 1 tablet at 13 hours, 7 hours, and 1 hour before contrast media injection, plus diphenhydramine (Benadryl) 50 mg by mouth 1 hour before contrast medium. You will need a ride home from imaging study. 3 tablet 0    AMLODIPINE 10 MG Oral Tab TAKE 1 TABLET BY MOUTH EVERY DAY 90 tablet 0    Losartan Potassium-HCTZ 100-12.5 MG Oral Tab TAKE 1 TABLET BY MOUTH EVERY DAY 90 tablet 1    metFORMIN  MG Oral Tablet 24 Hr TAKE 2  TABLETS BY MOUTH TWICE A  tablet 1    famotidine 20 MG Oral Tab Take 1 tablet (20 mg total) by mouth daily.      IPRATROPIUM BROMIDE 0.06 % Nasal Solution PLACE 2 SPRAYS INTO EACH NOSTRIL TWICE DAILY 3 each 2    Budesonide-Formoterol Fumarate (SYMBICORT) 80-4.5 MCG/ACT Inhalation Aerosol Inhale 2 puffs into the lungs 2 (two) times daily. 30.6 each 2    triamcinolone 0.1 % External Cream Apply topically 2 (two) times daily as needed.      Acetaminophen (ACETAMINOPHEN EXTRA STRENGTH) 500 MG Oral Cap Take 2 capsules (1,000 mg total) by mouth as needed.      diphenhydrAMINE 50 MG Oral Cap Take 1 capsule (50 mg total) by mouth every 6 (six) hours as needed for Itching.      Albuterol Sulfate HFA (PROAIR HFA) 108 (90 Base) MCG/ACT Inhalation Aero Soln Inhale 2 puffs into the lungs every 4 (four) hours as needed for Wheezing or Shortness of Breath. 1 Inhaler 3    Fluticasone Propionate 50 MCG/ACT Nasal Suspension SPRAY 2 SPRAYS INTO EACH NOSTRIL EVERY DAY 1 Bottle 11    Multiple Vitamin (ONE-A-DAY MENS) Oral Tab Take 1 tablet by mouth daily.      Fexofenadine HCl (ALLEGRA OR) Take 180 mg by mouth daily.        Omeprazole 20 MG Oral Tab EC Take 20 mg by mouth daily.        Cholecalciferol (VITAMIN D3) 1000 UNITS Oral Cap Take 1 tablet by mouth daily.      folic acid (FOLVITE) 800 MCG Oral Tab Take 2 tablets (1,600 mcg total) by mouth daily.         Allergies:  Allergies   Allergen Reactions    Cefaclor RASH    Radiology Contrast Iodinated Dyes RASH    Atovaquone RASH     Rash all over body    Bactrim RASH    Sulfa Antibiotics RASH    Sulfa Drugs Cross Reactors RASH         ROS:     A comprehensive 10 point review of systems was completed.  Pertinent positives and negatives noted in the the HPI.    PHYSICAL EXAM:     GENERAL APPEARANCE: well, developed, well nourished, in no acute distress  NEUROLOGIC: nonfocal, alert and oriented  HEAD: normocephalic, atraumatic  EYES: sclera non-icteric  EARS: hearing intact  ORAL  CAVITY: mucosa moist  NECK/THYROID: no obvious goiter or masses  LUNGS: nonlabored breathing  ABDOMEN: soft, no obvious masses or tenderness  SKIN: no obvious rashes    : as noted above     ASSESSMENT/PLAN:   Diagnoses and all orders for this visit:    Urine finding  -     POC Urinalysis, Automated Dip without microscopy (PCA and E[facility] ONLY) [39905]  -     ciprofloxacin (Cipro) tab 500 mg  -     Cytology, fluids; Future    Elevated PSA  -     PSA Total, Diagnostic; Future    BPH with obstruction/lower urinary tract symptoms    Erectile dysfunction, unspecified erectile dysfunction type    Urinary frequency    Malignant neoplasm of overlapping sites of bladder (HCC)  -     CYSTOURETHROSCOPY    Right renal mass  -     z Insight MRI ABDOMEN (W+WO) (CPT=74183); Future  -     CT KIDNEY RADFREQUENCY ABLATION (CPT=77013/41078); Future  -     predniSONE 50 MG Oral Tab; Take one tablet (50mg) by mouth 13 hours, 7 hours, and 1 hour prior to examination as directed.  -     diphenhydrAMINE HCl 50 MG Oral Tab; Take one (1) 50mg tablet by mouth one (1) hour before the examination.    - as noted above.    Thanks again for this consult.    Luis Hidalgo MD, FACS  Urologist  [facility]  Office: [phone]

## 2023-12-28 RX ORDER — BLOOD-GLUCOSE METER
1 EACH MISCELLANEOUS ONCE
Qty: 1 KIT | Refills: 0 | Status: SHIPPED | OUTPATIENT
Start: 2023-12-28 | End: 2023-12-28

## 2023-12-28 RX ORDER — INSULIN ASPART 100 [IU]/ML
INJECTION, SOLUTION INTRAVENOUS; SUBCUTANEOUS
Qty: 15 ML | Refills: 1 | Status: SHIPPED | OUTPATIENT
Start: 2023-12-28

## 2023-12-28 RX ORDER — LANCETS 33 GAUGE
1 EACH MISCELLANEOUS 3 TIMES DAILY
Qty: 300 EACH | Refills: 0 | Status: SHIPPED | OUTPATIENT
Start: 2023-12-28

## 2023-12-28 RX ORDER — BLOOD SUGAR DIAGNOSTIC
1 STRIP MISCELLANEOUS 3 TIMES DAILY
Qty: 300 STRIP | Refills: 1 | Status: SHIPPED | OUTPATIENT
Start: 2023-12-28

## 2023-12-28 NOTE — TELEPHONE ENCOUNTER
Cb filled Rx for pt increased Qty called pharmacy to make sure pt can . Contacted pharmacy to make sure pt is able to  if needed stated last dispensed was 12/12/23 for 33 day supply next refill is Nilson 10 if pt picks up now will cost over $700 dollars.      Contacted pt to make sure pt had enough insulin till Nilson 10 informed if he is about to run out before then to contact office pt is aware not to  new Rx

## 2023-12-29 LAB
CREAT BLD-MCNC: 1 MG/DL
EGFRCR SERPLBLD CKD-EPI 2021: 78 ML/MIN/1.73M2 (ref 60–?)

## 2024-01-02 ENCOUNTER — PROCEDURE (OUTPATIENT)
Dept: SURGERY | Facility: CLINIC | Age: 79
End: 2024-01-02
Payer: MEDICARE

## 2024-01-02 DIAGNOSIS — N40.1 BPH WITH OBSTRUCTION/LOWER URINARY TRACT SYMPTOMS: ICD-10-CM

## 2024-01-02 DIAGNOSIS — N28.89 RIGHT RENAL MASS: ICD-10-CM

## 2024-01-02 DIAGNOSIS — N13.8 BPH WITH OBSTRUCTION/LOWER URINARY TRACT SYMPTOMS: ICD-10-CM

## 2024-01-02 DIAGNOSIS — C67.8 MALIGNANT NEOPLASM OF OVERLAPPING SITES OF BLADDER (HCC): ICD-10-CM

## 2024-01-02 DIAGNOSIS — N52.9 ERECTILE DYSFUNCTION, UNSPECIFIED ERECTILE DYSFUNCTION TYPE: ICD-10-CM

## 2024-01-02 DIAGNOSIS — R82.90 URINE FINDING: Primary | ICD-10-CM

## 2024-01-02 DIAGNOSIS — R35.0 URINARY FREQUENCY: ICD-10-CM

## 2024-01-02 DIAGNOSIS — R97.20 ELEVATED PSA: ICD-10-CM

## 2024-01-02 PROCEDURE — 1160F RVW MEDS BY RX/DR IN RCRD: CPT | Performed by: UROLOGY

## 2024-01-02 PROCEDURE — 52000 CYSTOURETHROSCOPY: CPT | Performed by: UROLOGY

## 2024-01-02 PROCEDURE — 99215 OFFICE O/P EST HI 40 MIN: CPT | Performed by: UROLOGY

## 2024-01-02 PROCEDURE — 1159F MED LIST DOCD IN RCRD: CPT | Performed by: UROLOGY

## 2024-01-02 RX ORDER — CIPROFLOXACIN 500 MG/1
500 TABLET, FILM COATED ORAL ONCE
Status: COMPLETED | OUTPATIENT
Start: 2024-01-02 | End: 2024-01-02

## 2024-01-02 RX ORDER — PREDNISONE 50 MG/1
TABLET ORAL
Qty: 3 TABLET | Refills: 0 | Status: SHIPPED | OUTPATIENT
Start: 2024-01-02

## 2024-01-02 RX ADMIN — CIPROFLOXACIN 500 MG: 500 TABLET, FILM COATED ORAL at 09:38:00

## 2024-01-03 ENCOUNTER — TELEPHONE (OUTPATIENT)
Dept: INTERVENTIONAL RADIOLOGY/VASCULAR | Facility: HOSPITAL | Age: 79
End: 2024-01-03

## 2024-01-03 LAB — NON GYNE INTERPRETATION: NEGATIVE

## 2024-01-03 NOTE — PAT NURSING NOTE
Called and left a voicemail to call back. This is an attempt to schedule patient for a consultation as ordered by his physician.

## 2024-01-04 ENCOUNTER — LAB ENCOUNTER (OUTPATIENT)
Dept: LAB | Age: 79
End: 2024-01-04
Attending: NURSE PRACTITIONER
Payer: MEDICARE

## 2024-01-04 DIAGNOSIS — E11.29 TYPE 2 DIABETES MELLITUS WITH MICROALBUMINURIA, WITHOUT LONG-TERM CURRENT USE OF INSULIN  (HCC): ICD-10-CM

## 2024-01-04 DIAGNOSIS — R80.9 TYPE 2 DIABETES MELLITUS WITH MICROALBUMINURIA, WITHOUT LONG-TERM CURRENT USE OF INSULIN  (HCC): ICD-10-CM

## 2024-01-04 LAB
CREAT UR-SCNC: 25.8 MG/DL
EST. AVERAGE GLUCOSE BLD GHB EST-MCNC: 171 MG/DL (ref 68–126)
HBA1C MFR BLD: 7.6 % (ref ?–5.7)
MICROALBUMIN UR-MCNC: <0.5 MG/DL

## 2024-01-04 PROCEDURE — 36415 COLL VENOUS BLD VENIPUNCTURE: CPT

## 2024-01-04 PROCEDURE — 83036 HEMOGLOBIN GLYCOSYLATED A1C: CPT

## 2024-01-04 PROCEDURE — 82570 ASSAY OF URINE CREATININE: CPT

## 2024-01-04 PROCEDURE — 82043 UR ALBUMIN QUANTITATIVE: CPT

## 2024-01-09 ENCOUNTER — PATIENT MESSAGE (OUTPATIENT)
Dept: SURGERY | Facility: CLINIC | Age: 79
End: 2024-01-09

## 2024-01-09 NOTE — TELEPHONE ENCOUNTER
From: Shane Cueto III  To: Luis Hidalgo  Sent: 1/9/2024 12:50 PM CST  Subject: Billed for office visit    Was my bill submitted to [employer]? I should owe nothing as it should all be covered by [employer]. My [employer] data is in the system.

## 2024-01-11 ENCOUNTER — TELEPHONE (OUTPATIENT)
Dept: ENDOCRINOLOGY CLINIC | Facility: CLINIC | Age: 79
End: 2024-01-11

## 2024-01-11 NOTE — TELEPHONE ENCOUNTER
Per Pt, he has [employer] insurance, but we have [employer].   Will need updated insurance to place order for G7.

## 2024-01-12 NOTE — TELEPHONE ENCOUNTER
Found [employer] insurance card in media, related to pt's message. Orders sent to [facility] through paracte for CB to sign off.

## 2024-01-18 ENCOUNTER — PATIENT MESSAGE (OUTPATIENT)
Dept: ENDOCRINOLOGY CLINIC | Facility: CLINIC | Age: 79
End: 2024-01-18

## 2024-01-18 NOTE — TELEPHONE ENCOUNTER
From: Shane Cueto III  To: Galina Obrien  Sent: 1/18/2024 11:36 AM CST  Subject: Tresiba    Unless you direct different, I will increase the Tresiba from 10 to 12 tomorrow. I started the Tresiba on the 13 of this month and my last few fasting (first reading in the morning) glucose are:  Nilson 14 - 174  Nilson 15 - 211  Nilson 16 - 225  Nilson 17 - 181  Nilson 15 - 195

## 2024-01-22 NOTE — TELEPHONE ENCOUNTER
Per julieta:  This patients order shipped out on 1/19! We are all good to go here.    Sent pt [facility] about education appt

## 2024-02-13 RX ORDER — AMLODIPINE BESYLATE 10 MG/1
TABLET ORAL
Qty: 90 TABLET | Refills: 0 | Status: SHIPPED | OUTPATIENT
Start: 2024-02-13

## 2024-02-28 ENCOUNTER — OFFICE VISIT (OUTPATIENT)
Dept: ENDOCRINOLOGY CLINIC | Facility: CLINIC | Age: 79
End: 2024-02-28
Payer: MEDICARE

## 2024-02-28 VITALS
WEIGHT: 232.19 LBS | DIASTOLIC BLOOD PRESSURE: 60 MMHG | BODY MASS INDEX: 36 KG/M2 | OXYGEN SATURATION: 97 % | SYSTOLIC BLOOD PRESSURE: 110 MMHG | RESPIRATION RATE: 16 BRPM | HEART RATE: 73 BPM

## 2024-02-28 DIAGNOSIS — I10 ESSENTIAL HYPERTENSION: Primary | ICD-10-CM

## 2024-02-28 DIAGNOSIS — N28.9 NEPHROPATHY: ICD-10-CM

## 2024-02-28 DIAGNOSIS — R80.9 TYPE 2 DIABETES MELLITUS WITH MICROALBUMINURIA, WITHOUT LONG-TERM CURRENT USE OF INSULIN (HCC): ICD-10-CM

## 2024-02-28 DIAGNOSIS — E11.29 TYPE 2 DIABETES MELLITUS WITH MICROALBUMINURIA, WITHOUT LONG-TERM CURRENT USE OF INSULIN (HCC): ICD-10-CM

## 2024-02-28 DIAGNOSIS — E78.5 DYSLIPIDEMIA: ICD-10-CM

## 2024-02-28 PROCEDURE — 95251 CONT GLUC MNTR ANALYSIS I&R: CPT | Performed by: NURSE PRACTITIONER

## 2024-02-28 PROCEDURE — 3074F SYST BP LT 130 MM HG: CPT | Performed by: NURSE PRACTITIONER

## 2024-02-28 PROCEDURE — 3078F DIAST BP <80 MM HG: CPT | Performed by: NURSE PRACTITIONER

## 2024-02-28 PROCEDURE — 99214 OFFICE O/P EST MOD 30 MIN: CPT | Performed by: NURSE PRACTITIONER

## 2024-02-28 PROCEDURE — 1160F RVW MEDS BY RX/DR IN RCRD: CPT | Performed by: NURSE PRACTITIONER

## 2024-02-28 PROCEDURE — 1159F MED LIST DOCD IN RCRD: CPT | Performed by: NURSE PRACTITIONER

## 2024-02-28 RX ORDER — ACYCLOVIR 400 MG/1
TABLET ORAL
COMMUNITY
Start: 2024-02-28

## 2024-02-28 RX ORDER — METFORMIN HYDROCHLORIDE 500 MG/1
TABLET, EXTENDED RELEASE ORAL
Qty: 360 TABLET | Refills: 1 | Status: SHIPPED | OUTPATIENT
Start: 2024-02-28

## 2024-02-28 NOTE — PROGRESS NOTES
Chief Complaint   Patient presents with    Diabetes     Follow up dexcom danial Lynn is a 78 year old  presenting for type 2 DM medication management.   Primary care physician: Luis Napoles MD   Last DM appt    Needed prednisone for pre CT iodone allergy   Since that time his BG trends were over 200s. Started insulin rx.     Due to change with glycemic targets, frequent outreach     Started Tresiba 1-2024 due to increase in baseline trends   Also started wearing dexcom - really likes wearing dexcom ; he likes to see the numbers          Most recent  A1C  7.6% ( last A1C  7.3% )   GMI 7.1% on Dexcom report     Dexcom download: 2.15.2024 thru 2.28.2024      Coefficient of variation: 19.8   GMI: estimated A1C 7.1     Average glucose: 158 mg/dl     79% In target range(70-180mg/dl)     20%High glucose targets (> 180mg/dl) :     1%Very high glucose targets:  (> 250mg/dl)     0%Low glucose targets:(less than 70mg/dl)     0%Very Low glucose targets: (< 54mg/dl ) :     Findings:   Hypoglycemia: no      Time in target: 79 %  (ADA recommended target > 70%)         Hx bladder cancer after elevated PSA. 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 Effect    Farxiga: lack of effect , cost     Current DM Regimen:  Metformin XR 500MG 2 tab twice daily   Tresiba u 100 flex touch: 14 units daily   Novolog flex pen: --> not following - See below    If blood sugar is under 180 before the meal : NO insulin   If blood sugar is 180- 220, take 4 units of insulin   If blood sugar is 221-260, take 5 units of insulin   If blood sugar is 261-300, take  6 units of insulin   If blood sugar is over 301, take 7 units of insulin    If under 150 mg/dl : no insulin unless eating carb   150 - 180, dose 12 units   >  180 , dose 14 units    HGBA1C:    Lab Results   Component Value Date    A1C 7.6 (H) 01/04/2024    A1C 7.3 (H) 09/06/2023    A1C 6.7 02/03/2023     (H) 01/04/2024       Lab Results   Component Value Date    CHOLEST 144 09/06/2023    CHOLEST 151 03/29/2023    TRIG 259 (H) 09/06/2023    TRIG 208 (H) 03/29/2023    HDL 38 (L) 09/06/2023    HDL 43 03/29/2023    LDL 64 09/06/2023    LDL 73 03/29/2023     Lab Results   Component Value Date    MICROALBCREA  01/04/2024      Comment:      Unable to calculate due to Urine Microalbumin <0.5 mg/dL      MICROALBCREA 30.6 (H) 03/29/2023      Lab Results   Component Value Date    CREATSERUM 1.17 09/06/2023    CREATSERUM 1.08 03/29/2023    EGFRCR 78 12/27/2023    EGFRCR 65 09/06/2023     Lab Results   Component Value Date    AST 13 (L) 09/06/2023    AST 13 (L) 03/29/2023    ALT 31 09/06/2023    ALT 31 03/29/2023       Lab Results   Component Value Date    TSH 0.826 05/19/2021    TSH 0.837 10/08/2019           DM Complications:  Microvascular:   Neuropathy: no  Retinopathy: no  Nephropathy: yes     Macrovascular:  PVD: no  CAD: no  Stroke/CVA: no    Modifying factors:  Medication adherence: Yes   Recent steroids, illness or infections: (past 3m) No     Allergies: Cefaclor, Radiology contrast iodinated dyes, Atovaquone, Bactrim, Sulfa antibiotics, and Sulfa drugs cross reactors    Past Medical History:   Diagnosis Date    Acute venous embolism and thrombosis of unspecified deep vessels of lower extremity     Allergic rhinitis 2011    Arthritis 2015    Asthma ([facility]) 2011    Bladder cancer ([facility])     BLOOD CLOTS     Blood disorder     Cancer ([facility]) 2021    bladder    Cataract     COPD (chronic obstructive pulmonary disease) ([facility]) 2012    not using oxygen    Dermatitis due to drugs and medicines taken internally(693.0)     Diabetes ([facility]) 11/2014    Diabetes mellitus ([facility])     Edema     Elevated blood pressure reading without diagnosis of hypertension     Esophageal reflux      Extrinsic asthma, unspecified     Food intolerance Bloat    Hearing impairment     tinnitus    Hearing loss     Heartburn 2012    Hemolytic anemia (HCC)     Hepatitis, unspecified     age 20 yr; no s/s time of diagnosis; no treatment; no current symptoms for 50 years    Hereditary hemolytic anemia, unspecified (HCC)     High blood pressure     High cholesterol     History of blood transfusion     no reactions    HOSPITALIZATIONS     PCP pneumonia and ALI    HYPERLIPIDEMIA     Infectious mononucleosis     Jaundice 2010    Hemolytic Anemia    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    OBESITY     Otalgia, unspecified     Other and unspecified hyperlipidemia     OTHER DISEASES     autoimmune hemolytic pneumonia    OTHER DISEASES     PCP pneumonia    OTHER DISEASES     PE    OTHER DISEASES     eczema    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)     Personal history of antineoplastic chemotherapy 03/03/2022    gemcitabine instillation    Personal history of pneumonia (recurrent)     Personal history of venous thrombosis and embolism     Plantar fasciitis     Pneumonia, organism unspecified(486)     PONV (postoperative nausea and vomiting)     nausea    Postnasal drip     Psoriasis     PULMONARY EMBOLISM     Sleep apnea, obstructive EDW  DX   5-06-11/ TX 7-15-11    AHI  36.4/ supine  115/ krysten  80%/ BIPAP 13/9 / Sleep  RX     Unspecified essential hypertension     Unspecified sleep apnea     AHI 36, O2 krysten of 80%, CPAP    Unspecified tinnitus     Visual impairment      Past Surgical History:   Procedure Laterality Date    BRONCHOSCOPY,BIOPSY  02/2011    CATARACT      10 and 11/2021    COLONOSCOPY      x 2 at age 52 and 57    COLONOSCOPY  02/09/2014    Procedure: COLONOSCOPY;  Surgeon: Malcolm Mcadams MD;  Location:  ENDOSCOPY    COLONOSCOPY  08/2014    and EGD    COLONOSCOPY N/A 8/14/2023    Procedure: COLONOSCOPY with cold snare polypectomy  ESOPHAGOGASTRODUODENOSCOPY;  Surgeon: Moy Amaya,  DO;  Location:  ENDOSCOPY    KNEE REPLACEMENT SURGERY  Left Knee 2022    Right knee 25    SIGMOIDOSCOPY,DIAGNOSTIC      SKIN SURGERY  2009    tags, moles    TOTAL KNEE REPLACEMENT Bilateral     VASECTOMY       Social History     Socioeconomic History    Marital status:    Tobacco Use    Smoking status: Former     Packs/day: 1.50     Years: 40.00     Additional pack years: 0.00     Total pack years: 60.00     Types: Cigarettes, Pipe, Cigars     Start date: 1962     Quit date: 1/3/2008     Years since quittin.1    Smokeless tobacco: Never   Vaping Use    Vaping Use: Never used   Substance and Sexual Activity    Alcohol use: Not Currently     Comment: Quit     Drug use: Never    Sexual activity: Never   Other Topics Concern    Caffeine Concern No    Stress Concern No    Weight Concern Yes    Special Diet No    Exercise Yes    Seat Belt Yes     Family History   Problem Relation Age of Onset    Other (Other) Mother         hepatitis b/DJD    Alcohol and Other Disorders Associated Father     Asthma Sister     Other (Other) Sister         osteoarthritis    Ovarian Cancer Sister     Diabetes Brother     Hypertension Brother     Stroke Brother     Mental Disorder Brother         DD    Stroke Brother     Alcohol and Other Disorders Associated Son     Hypertension Brother      Current Outpatient Medications   Medication Sig Dispense Refill    metFORMIN  MG Oral Tablet 24 Hr TAKE 2 TABLETS BY MOUTH TWICE A  tablet 1    Continuous Blood Gluc Sensor (DEXCOM G7 SENSOR) Does not apply Misc       AMLODIPINE 10 MG Oral Tab TAKE 1 TABLET BY MOUTH EVERY DAY 90 tablet 0    Insulin Degludec (TRESIBA FLEXTOUCH) 100 UNIT/ML Subcutaneous Solution Pen-injector Up to 15 units daily with dose titration as directed 15 mL 0    insulin aspart (NOVOLOG FLEXPEN) 100 Units/mL Subcutaneous Solution Pen-injector Uses up to 45 units daily as directed in divided doses 15 mL 1    ROSUVASTATIN 10 MG Oral  Tab TAKE 1 TABLET BY MOUTH EVERY DAY AT NIGHT 90 tablet 0    Losartan Potassium-HCTZ 100-12.5 MG Oral Tab TAKE 1 TABLET BY MOUTH EVERY DAY 90 tablet 1    famotidine 20 MG Oral Tab Take 1 tablet (20 mg total) by mouth daily.      Multiple Vitamin (ONE-A-DAY MENS) Oral Tab Take 1 tablet by mouth daily.      Fexofenadine HCl (ALLEGRA OR) Take 180 mg by mouth daily.        Omeprazole 20 MG Oral Tab EC Take 20 mg by mouth daily.        Cholecalciferol (VITAMIN D3) 1000 UNITS Oral Cap Take 1 tablet by mouth daily.      folic acid (FOLVITE) 800 MCG Oral Tab Take 2 tablets (1,600 mcg total) by mouth daily.      Glucose Blood (ONETOUCH VERIO) In Vitro Strip 1 each in the morning, at noon, and at bedtime. Use as directed. 300 strip 1    OneTouch Delica Lancets 33G Does not apply Misc 1 Lancet by Finger stick route in the morning, at noon, and at bedtime. 300 each 0    BD PEN NEEDLE WILBER 2ND GEN 32G X 4 MM Does not apply Misc 3 injections daily 50 each 3    IPRATROPIUM BROMIDE 0.06 % Nasal Solution PLACE 2 SPRAYS INTO EACH NOSTRIL TWICE DAILY 3 each 2    Budesonide-Formoterol Fumarate (SYMBICORT) 80-4.5 MCG/ACT Inhalation Aerosol Inhale 2 puffs into the lungs 2 (two) times daily. 30.6 each 2    triamcinolone 0.1 % External Cream Apply topically 2 (two) times daily as needed.      Acetaminophen (ACETAMINOPHEN EXTRA STRENGTH) 500 MG Oral Cap Take 2 capsules (1,000 mg total) by mouth as needed.      Albuterol Sulfate HFA (PROAIR HFA) 108 (90 Base) MCG/ACT Inhalation Aero Soln Inhale 2 puffs into the lungs every 4 (four) hours as needed for Wheezing or Shortness of Breath. 1 Inhaler 3    Fluticasone Propionate 50 MCG/ACT Nasal Suspension SPRAY 2 SPRAYS INTO EACH NOSTRIL EVERY DAY 1 Bottle 11       DM associated review of  symptoms:   Endocrine: Polyuria, polyphagia, polydipsia: no  Neurological: Paresthesias: yes, transient LE   HEENT: Blurred vision: no  Skin: no rash or wounds  Hematological: Hypoglycemia: no      Review of  Systems     LUNGS: denies shortness of breath . + MOMO   CARDIOVASCULAR: denies chest pain  GI: denies abdominal pain, nausea. Transient diarrhea . + GERD HX   : denies dysuria  M/S : no        Physical exam:  /60   Pulse 73   Resp 16   Wt 232 lb 3.2 oz (105.3 kg)   SpO2 97%   BMI 36.44 kg/m²   Body mass index is 36.44 kg/m².    Physical Exam   Vitals reviewed.  Constitutional: Normal appearance   Cardiovascular: Normal rate   Pulmonary/Chest: Effort normal  Neurological: Alert and oriented .   Psychiatric: Normal mood and affect.     Assessment/Plan:    Essential hypertension  Well controlled but needs ongoing monitoring   CPM     Obesity     Weight: 232  lb ( last weight 221 lb)     Dyslipidemia   Labs   Continue Rosuvastatin 10mg rx.     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  ([facility])  A1C: 7.6% (last A1C 7.3 %)    GMI on Dexcom 7.1%   Weight: 232  lb (last weight 221 lb )   Diabetes control is increased - but recent GMI indicates better control   Needs ongoing management and evaluation  given the chronicity of Diabetes, ongoing medication monitoring and risk for complications     Brochure provided on T slim pump since integrated with Dexcom G7     Increase Tresiba 14 units once daily   After 7 days, if blood sugar is over 150, increase dose to 16 units   Change novolog doses:       Novolog flex pen: -->   Under 150 and eating carbs, dose 8 units   150-180, dose 10 units   Over 180 dose 12 units     Do not dose again for at least 3 hours unless eating   Continue   Dexcom G7 (using smart phone)   Metformin XR 500m tab twice daily       Reminded pt on A1C and blood sugar targets (Fasting < 130 and post prandial <180 ) and complications associated with hyperglycemia and uncontrolled DM (on AVS)   Recommended SMBG 2-3 x weekly   Reminded to continue with lifestyle modifications since they have  positive impact on diabetes/blood sugars/health (portion control, physical activity, weight loss)   Planning to move to [place]; if not moved before 4m, will f/u w me         The patient indicates understanding of these issues and agrees to the plan.      Orders Placed This Encounter    Interpretation code 72 hour glucose monitor    Continuous Blood Gluc Sensor (DEXCOM G7 SENSOR) Does not apply Misc     [facility] DME     DM quality  A1C/Blood pressure: as reported above   Nephropathy screenin + urine m/alb  continue ace /arb rx.   LIPID screening: labs   . Pravastatin rx.   Last dilated eye exam: Last Dilated Eye Exam: 23   Exam shows retinopathy? Eye Exam shows Diabetic Retinopathy?: No  Last diabetic foot exam: Last Foot Exam: 03/15/23    Defer foot exam to follow up appointment

## 2024-02-28 NOTE — TELEPHONE ENCOUNTER
Requested Prescriptions     Pending Prescriptions Disp Refills    METFORMIN  MG Oral Tablet 24 Hr [Pharmacy Med Name: METFORMIN HCL  MG TABLET] 360 tablet 1     Sig: TAKE 2 TABLETS BY MOUTH TWICE A DAY     Your appointments       Date & Time Appointment Department ([place])    Feb 28, 2024 11:00 AM CST Follow Up Visit with Galina Obrien APRN [facility] ([facility])    Contact your primary care provider if your insurance requires a referral.    Please arrive 15 minutes prior to your scheduled appointment. Be sure to bring your current Insurance card, Photo ID, and medication bottles or a list of your current medications.      A 24 hour notice is required to cancel any appointment or you may be charged a $40 No Show Fee.     Important: 24 hour notice is required to cancel any appointment or you may be charged a $40 No Show Fee. Please notify your physician office.         Apr 08, 2024 11:30 AM CDT Follow up - Extended with Luis Napoles MD [address] ([facility] [place])        Jul 08, 2024 11:00 AM CDT Procedure with Luis Hidalgo MD [facility] ([facility])              [address]  [facility] [place]  [address]  [facility] [id number] [phone] [facility]  [facility]  [address] [facility] [address]  [facility] [id number] [phone] [facility]  [address] [facility] [address]  [facility] [id number] [phone]          Last A1c value was 7.6% done 1/4/2024.    Refill 9/5/23  LOV 10/04/23

## 2024-02-28 NOTE — PATIENT INSTRUCTIONS
A1C7.6%   Estimated A1C on dexcom 7.1%    Continue   Metformin XR 500MG 2 tab twice daily     Increase Tresiba u 100 flex touch: 16 units daily   After 7 days if fasting sugars are still above 150, increase to 18 unit s    Be cautious with novolog dosing       Novolog flex pen: -->   Under 150 and eating carbs, dose 8 units   150-180, dose 10 units   Over 180 dose 12 units     Do not dose again for at least 3 hours unless eating       American Diabetes Association: blood sugar targets:     Fasting blood sugar (before breakfast) Target:     2 hours after eating less than 180      Call for blood sugars less than  80 or greater than  200 more than 2 times in a week     If you are wearing the sensor, please look at your trends/averages    Recommendations:   GMI (estimated A1C ) target under  7%     Time in range (healthy blood sugar targets) : goal is over 70%   less than 70 : goal is less than 2%   Over 180 : goal is less than 20 %   Over 250: goal is  less than 5%         Watch for low blood sugars: (less than 70 )    Treatment of Low Blood Glucose Action Plan  1. Check blood glucose to be sure that it is low. You cannot  always go by symptoms or how you feel. If in doubt, treat your low blood glucose anyway.  Rule of 15 :     2. Take 15 grams of carbohydrate (carb). Here are some choices:    4 oz. regular fruit juice  3-4 glucose tablets  6 oz. regular soda   7-8 jelly beans    3. Recheck blood glucose after 10-15 minutes. If blood glucose is still low (less than 70 mg/dl) repeat the treatment (step 2).    4. If your next meal is more than one hour away, eat a small snack.    5. If you’re not sure what caused your low blood glucose, call your healthcare provider.    6. Always check your blood glucose before you drive

## 2024-03-04 RX ORDER — BLOOD-GLUCOSE METER
EACH MISCELLANEOUS
Qty: 1 KIT | Refills: 0 | Status: SHIPPED | OUTPATIENT
Start: 2024-03-04

## 2024-03-04 NOTE — TELEPHONE ENCOUNTER
Requested Prescriptions     Pending Prescriptions Disp Refills    ONETOUCH VERIO FLEX SYSTEM w/Device Does not apply Kit [Pharmacy Med Name: ONE TOUCH VERIO FLEX SYST KIT]  0     Si EACH ONE TIME FOR 1 DOSE.     Future Appointments   Date Time Provider Department Center   2024 11:30 AM Luis Napoles MD [facility]   2024 11:00 AM Luis Hidalgo MD [id number]   2024 11:15 AM Galina Obrien APRN [id number]

## 2024-03-18 RX ORDER — INSULIN ASPART 100 [IU]/ML
INJECTION, SOLUTION INTRAVENOUS; SUBCUTANEOUS
Qty: 45 ML | Refills: 1 | Status: SHIPPED | OUTPATIENT
Start: 2024-03-18

## 2024-03-18 RX ORDER — INSULIN DEGLUDEC INJECTION 100 U/ML
INJECTION, SOLUTION SUBCUTANEOUS
Qty: 18 ML | Refills: 1 | Status: SHIPPED | OUTPATIENT
Start: 2024-03-18

## 2024-04-02 ENCOUNTER — PATIENT MESSAGE (OUTPATIENT)
Dept: SURGERY | Facility: CLINIC | Age: 79
End: 2024-04-02

## 2024-04-05 RX ORDER — LOSARTAN POTASSIUM AND HYDROCHLOROTHIAZIDE 12.5; 1 MG/1; MG/1
1 TABLET ORAL DAILY
Qty: 90 TABLET | Refills: 1 | Status: SHIPPED | OUTPATIENT
Start: 2024-04-05

## 2024-04-05 NOTE — TELEPHONE ENCOUNTER
Name from pharmacy: LOSARTAN-HCTZ 100-12.5 MG TAB          Will file in chart as: LOSARTAN POTASSIUM-HCTZ 100-12.5 MG Oral Tab    Sig: TAKE 1 TABLET BY MOUTH EVERY DAY    Disp: 90 tablet    Refills: 1    Start: 4/5/2024    Class: Normal    Non-formulary    Last ordered: 5 months ago (10/9/2023) by Luis Napoles MD    Last refill: 1/8/2024    Rx #: [id number]    Hypertension Medications Protocol [id number]/05/2024 01:13 AM   Protocol Details CMP or BMP in past 12 months    Last BP reading less than 140/90    In person appointment or virtual visit in the past 12 mos or appointment in next 3 mos    EGFRCR or GFRNAA > 50        LOV 10/5/23     6 month supply sent 10/9/23

## 2024-05-17 RX ORDER — AMLODIPINE BESYLATE 10 MG/1
10 TABLET ORAL DAILY
Qty: 90 TABLET | Refills: 3 | Status: SHIPPED | OUTPATIENT
Start: 2024-05-17

## 2024-05-17 NOTE — TELEPHONE ENCOUNTER
Refill Per Protocol     Requested Prescriptions   Pending Prescriptions Disp Refills    AMLODIPINE 10 MG Oral Tab [Pharmacy Med Name: AMLODIPINE BESYLATE 10 MG TAB] 90 tablet 0     Sig: TAKE 1 TABLET BY MOUTH EVERY DAY       Hypertension Medications Protocol Passed - 5/16/2024 12:51 AM        Passed - CMP or BMP in past 12 months        Passed - Last BP reading less than 140/90     BP Readings from Last 1 Encounters:   02/28/24 110/60               Passed - In person appointment or virtual visit in the past 12 mos or appointment in next 3 mos     Recent Outpatient Visits              2 months ago Essential hypertension    [facility] Galina Obrien APRN    Office Visit    7 months ago Encounter for annual health examination    [facility], [address] [place] [address] Luis Napoles MD    Office Visit    7 months ago Type 2 diabetes mellitus with microalbuminuria, without long-term current use of insulin (HCC)    [facility] Galina Obrien APRN    Office Visit    10 months ago Iron deficiency anemia, unspecified iron deficiency anemia type    [facility]    Nurse Only    11 months ago Iron deficiency anemia, unspecified iron deficiency anemia type    [facility],  [facility] Yandy Bravo APRN    Office Visit          Future Appointments         Provider Department Appt Notes    In 1 month Luis Hidalgo MD [facility] 6 month follow w/MRI Prior    In 3 months Galina Obrien APRN [facility] 6 month f/u                    Passed - EGFRCR or GFRNAA > 50     GFR Evaluation  EGFRCR: 78 , resulted on 12/27/2023                 Future Appointments         Provider Department Appt Notes    In 1 month Luis Hidalgo MD [facility] 6 month follow  w/MRI Prior    In 3 months Galina Obrien APRN [facility] 6 month f/u          Recent Outpatient Visits              2 months ago Essential hypertension    [facility] Galina Obrien APRN    Office Visit    7 months ago Encounter for annual health examination    [facility], [address] [place] [address] Luis Napoles MD    Office Visit    7 months ago Type 2 diabetes mellitus with microalbuminuria, without long-term current use of insulin (HCC)    [facility] Galina Obrien APRN    Office Visit    10 months ago Iron deficiency anemia, unspecified iron deficiency anemia type    [facility]    Nurse Only    11 months ago Iron deficiency anemia, unspecified iron deficiency anemia type    [facility],  [facility] Yandy Bravo APRN    Office Visit

## 2024-05-29 ENCOUNTER — PATIENT MESSAGE (OUTPATIENT)
Dept: SURGERY | Facility: CLINIC | Age: 79
End: 2024-05-29

## 2024-06-10 ENCOUNTER — TELEPHONE (OUTPATIENT)
Dept: INTERNAL MEDICINE CLINIC | Facility: CLINIC | Age: 79
End: 2024-06-10

## 2024-06-10 NOTE — TELEPHONE ENCOUNTER
Attempted to schedule Supervisit. Patient stated he moved to [place] about 6 weeks ago. Patient informed to call insurance to remove Primary Care Physician

## 2024-08-09 ENCOUNTER — TELEPHONE (OUTPATIENT)
Dept: INTERNAL MEDICINE CLINIC | Facility: CLINIC | Age: 79
End: 2024-08-09

## 2024-09-11 NOTE — TELEPHONE ENCOUNTER
Spoke to patient who has moved to [place], he states he contacted [facility] to assign his new PCP in [place].  Patient will call [facility] again to make sure the correct provider is listed.    I told patient I would call him back in about a month if DR. SYDNEY UMAÑA is still showing as his PCP on the September [facility] eligibility list.

## 2025-03-05 ENCOUNTER — PATIENT MESSAGE (OUTPATIENT)
Dept: INTERNAL MEDICINE CLINIC | Facility: CLINIC | Age: 80
End: 2025-03-05

## 2025-04-11 ENCOUNTER — PATIENT OUTREACH (OUTPATIENT)
Dept: CASE MANAGEMENT | Age: 80
End: 2025-04-11

## 2025-04-11 NOTE — PROCEDURES
The office order for PCP removal request is Approved and finalized on April 11, 2025.    Removed Luis Napoles MD as the patient's Primary Care Physician

## (undated) DIAGNOSIS — C67.9 MALIGNANT NEOPLASM OF URINARY BLADDER, UNSPECIFIED SITE (HCC): Primary | ICD-10-CM

## (undated) DEVICE — SYRINGE,TOOMEY,IRRIGATION,70CC,STERILE: Brand: MEDLINE

## (undated) DEVICE — ZIPWIRE GUIDEWIRE .035X150 STR

## (undated) DEVICE — STERILE SYNTHETIC POLYISOPRENE POWDER-FREE SURGICAL GLOVES WITH HYDROGEL COATING: Brand: PROTEXIS

## (undated) DEVICE — CYSTO CDS-LF: Brand: MEDLINE INDUSTRIES, INC.

## (undated) DEVICE — GAUZE SPONGES,12 PLY: Brand: CURITY

## (undated) DEVICE — BLOCK BITE MAXI 60FR

## (undated) DEVICE — SCD SLEEVE KNEE HI BLEND

## (undated) DEVICE — 1200CC GUARDIAN II: Brand: GUARDIAN

## (undated) DEVICE — SOL  .9 3000ML

## (undated) DEVICE — KIT ENDO ORCAPOD 160/180/190

## (undated) DEVICE — CHEMOTHERAPY CONTAINER,SLIDE LID, YELLOW: Brand: SHARPSAFETY

## (undated) DEVICE — SNARE 9MM 230CM 2.4MM EXACTO

## (undated) DEVICE — FORCEP RADIAL JAW 4

## (undated) DEVICE — 3M™ RED DOT™ MONITORING ELECTRODE WITH FOAM TAPE AND STICKY GEL, 50/BAG, 20/CASE, 72/PLT 2570: Brand: RED DOT™

## (undated) DEVICE — CATH URET CONE TIP 8FR 138008

## (undated) DEVICE — Device

## (undated) DEVICE — SPONGE STICK WITH PVP-I: Brand: KENDALL

## (undated) DEVICE — SOL H2O 1000ML BTL

## (undated) DEVICE — HF-RESECTION ELECTRODE PLASMALOOP LOOP, MEDIUM, 24 FR., 12°/16°, ESG TURIS: Brand: OLYMPUS

## (undated) DEVICE — SYRINGE 30ML LL TIP

## (undated) DEVICE — MEGADYNE ELECTRODE ADULT PT RT

## (undated) DEVICE — BIOGUARD CLEANING ADAPTER

## (undated) DEVICE — 10FT COMBINED O2 DELIVERY/CO2 MONITORING. FILTER WITH MICROSTREAM TYPE LUER: Brand: DUAL ADULT NASAL CANNULA

## (undated) DEVICE — STERIS KITS

## (undated) DEVICE — GAUZE TRAY STERILE 4X4 12PLY

## (undated) DEVICE — URINE DRAINAGE BAG,BAG, NEEDLE SAMPLING, DRAIN TUBE: Brand: DOVER

## (undated) DEVICE — CATH SECURING DEVICE STATLOCK

## (undated) DEVICE — BAG DRAIN INFECTION CNTRL 2000

## (undated) DEVICE — REM POLYHESIVE ADULT PATIENT RETURN ELECTRODE: Brand: VALLEYLAB

## (undated) DEVICE — SLEEVE KENDALL SCD EXPRESS MED

## (undated) DEVICE — TRAP SPEC REMOVAL ETRAP 15CM

## (undated) NOTE — MR AVS SNAPSHOT
[facility] [address] [phone]               Thank you for choosing us for your health care visit with Brody Reagan MD.  We are glad to serve you and happy to provide you with this summa Fasting Blood Sugar (FSB)   Patient must be diagnosed with one of the following:   • Hypertension   • Dyslipidemia   • Obesity (BMI ³30 kg/m2)   • Previous elevated impaired FBS or GTT   … or any two of the following:   • Overweight (BMI ³25 but <30)   • Covered Annually OCCULT BLOOD RESULT (no units)   Date Value   02/06/2014 RESULT SPECIMEN 1[de-identified] NEGATIVE FOR OCCULT BLOOD    No flowsheet data found.      Barium Enema-   uncomfortable but covered  Covered but uncomfortable   Glaucoma Screening      Ophthalmo covered by Medicare Part B) No orders found for this or any previous visit.  This may be covered with your prescription benefits, but Medicare does not cover unless Medically needed    Zoster (Not covered by Medicare Part B) No orders found for this or any Take 1,600 mcg by mouth daily. Commonly known as:  FOLVITE           Glucose Blood Strp   1 each by Other route daily.  Dx: E11.9   What changed:  additional instructions   Commonly known as:  ACCU-CHEK SMARTVIEW           Ipratropium Bromide 0.06 % Soln If you've recently had a stay at the Hospital you can access your discharge instructions in [employer] by going to Visits < Admission Summaries.  If you've been to the Emergency Department or your doctor's office, you can view your past visit information in My

## (undated) NOTE — LETTER
12/11/20        [facility] [address]      Dear Amy Martel,    Our records indicate that you have outstanding lab work and or testing that was ordered for you and has not yet been completed:  Orders Placed Th

## (undated) NOTE — LETTER
December 11, 2017    [address] [facility]   [place] [address]      Dear Nimisha Henley: It was a pleasure speaking to you over the phone recently. I have enclosed some information that you may find helpful.   I look forward to ta

## (undated) NOTE — LETTER
June 30, 2017    [address] [facility]   [place] [address]      Dear Francy Parks: It was a pleasure speaking to you over the phone recently. I have enclosed some information that you may find helpful.   I look forward to Nila

## (undated) NOTE — Clinical Note
Melvin Cannon,   This pt has been stable in DM over a year- at my last visit (2-2020)  I had recommended he could return to PCP for DM management however he was told to jacek back up w me -   I discussed w him today - since he sees you annually for px- he can a

## (undated) NOTE — LETTER
09/25/19        [address] [facility] [address]      Dear Ashleigh Rosa,    Our records indicate that you have outstanding lab work and or testing that was ordered for you and has not yet been completed:  Orders Placed Th